# Patient Record
Sex: FEMALE | Race: WHITE | NOT HISPANIC OR LATINO | Employment: FULL TIME | URBAN - METROPOLITAN AREA
[De-identification: names, ages, dates, MRNs, and addresses within clinical notes are randomized per-mention and may not be internally consistent; named-entity substitution may affect disease eponyms.]

---

## 2017-03-24 ENCOUNTER — OFFICE VISIT (OUTPATIENT)
Dept: FAMILY MEDICINE | Facility: CLINIC | Age: 27
End: 2017-03-24
Payer: COMMERCIAL

## 2017-03-24 VITALS
HEIGHT: 62 IN | RESPIRATION RATE: 15 BRPM | OXYGEN SATURATION: 98 % | SYSTOLIC BLOOD PRESSURE: 122 MMHG | DIASTOLIC BLOOD PRESSURE: 84 MMHG | BODY MASS INDEX: 41.36 KG/M2 | TEMPERATURE: 99 F | WEIGHT: 224.75 LBS | HEART RATE: 94 BPM

## 2017-03-24 DIAGNOSIS — N63.10 LUMP OF RIGHT BREAST: Primary | ICD-10-CM

## 2017-03-24 DIAGNOSIS — Z23 IMMUNIZATION DUE: ICD-10-CM

## 2017-03-24 PROCEDURE — 1160F RVW MEDS BY RX/DR IN RCRD: CPT | Mod: S$GLB,,, | Performed by: NURSE PRACTITIONER

## 2017-03-24 PROCEDURE — 99214 OFFICE O/P EST MOD 30 MIN: CPT | Mod: 25,S$GLB,, | Performed by: NURSE PRACTITIONER

## 2017-03-24 PROCEDURE — 90471 IMMUNIZATION ADMIN: CPT | Mod: S$GLB,,, | Performed by: NURSE PRACTITIONER

## 2017-03-24 PROCEDURE — 90651 9VHPV VACCINE 2/3 DOSE IM: CPT | Mod: S$GLB,,, | Performed by: NURSE PRACTITIONER

## 2017-03-24 NOTE — MR AVS SNAPSHOT
[facility]  [address] [address]  [place] [address]  Phone: [phone]  Fax: [phone]                  Wendy Millan   3/24/2017 3:20 PM   Office Visit    Description:  Female : 1990   Provider:  Rosa Thomason NP   Department:  [facility]           Reason for Visit     Abrasion           Diagnoses this Visit        Comments    Lump of right breast    -  Primary     Immunization due                To Do List           Future Appointments        Provider Department Dept Phone    3/28/2017 11:30 AM [facility] US2 [facility] [phone]    3/31/2017 10:30 AM [facility] MAMMO1 [facility] [phone]    2017 10:00 AM NURSE, [facility] [phone]    2017 10:00 AM NURSE, [facility] [phone]      Goals (5 Years of Data)              6/22/15    BMI is less than 25   Not on track      [facility]     [facility] Nurse [place] [facility] Line -  Assistance  Registered nurses in the [facility] provide clinical advisement, health education, appointment booking, and other advisory services.  Call for this free service at [phone].             Medications           Message regarding Medications     Verify the changes and/or additions to your medication regime listed below are the same as discussed with your clinician today.  If any of these changes or additions are incorrect, please notify your healthcare provider.        STOP taking these medications     doxycycline (VIBRA-TABS) 100 MG tablet 1 po bid with food, do not lay down for at least 1 hour after ingestion    ELIDEL 1 % cream AAA eyelids twice daily prn    ZIANA gel Apply small amount to entire face qhs           Verify that the below list of medications is an accurate representation of the medications you are currently taking.  If none reported, the list may be blank. If  "incorrect, please contact your healthcare provider. Carry this list with you in case of emergency.           Current Medications     diphenhydrAMINE (BENADRYL) 25 mg capsule Take 25 mg by mouth every 6 (six) hours as needed for Itching.    omeprazole (PRILOSEC) 40 MG capsule Take 1 capsule (40 mg total) by mouth once daily.    tazarotene (TAZORAC) 0.05 % Crea cream Apply topically once daily.           Clinical Reference Information           Your Vitals Were     BP Pulse Temp Resp Height Weight    122/84 (BP Location: Left arm, Patient Position: Sitting, BP Method: Manual) 94 99.4 °F (37.4 °C) (Oral) 15 5' 2" (1.575 m) 101.9 kg (224 lb 12.1 oz)    SpO2 BMI             98% 41.11 kg/m2         Blood Pressure          Most Recent Value    BP  122/84      Allergies as of 3/24/2017     No Known Allergies      Immunizations Administered on Date of Encounter - 3/24/2017     Name Date Dose VIS Date Route    HPV 9-Valent 3/24/2017 0.5 mL 12/2/2016 Intramuscular      Orders Placed During Today's Visit      Normal Orders This Visit    HPV Vaccine (9-Valent) (3 Dose) (IM)     Future Labs/Procedures Expected by Expires    Mammo Digital Diagnostic Right With CAD  3/24/2017 5/24/2018    US Breast Right Limited  3/24/2017 5/24/2018      Language Assistance Services     ATTENTION: Language assistance services are available, free of charge. Please call [phone].      ATENCIÓN: Si habla español, tiene a maldonado disposición servicios gratuitos de asistencia lingüística. Llame al [phone].     CHÚ Ý: N?u b?n nói Ti?ng Vi?t, có các d?ch v? h? tr? ngôn ng? mi?n phí dành cho b?n. G?i s? [phone].         [facility] complies with applicable Federal civil rights laws and does not discriminate on the basis of race, color, national origin, age, disability, or sex.        "

## 2017-03-25 NOTE — PROGRESS NOTES
Answers for HPI/ROS submitted by the patient on 3/23/2017   neck pain: No, No  unexpected weight change: No  hearing loss: No  rhinorrhea: No  trouble swallowing: No  eye discharge: No  visual disturbance: No  chest tightness: No  wheezing: No  chest pain: No  palpatations: No  blood in stool: No  constipation: No  vomiting: No  diarrhea: No  polydipsia: No  polyuria: No  difficulty urinating: No  hematuria: No  menstrual problem: No  dysuria: No  joint swelling: No  arthralgias: No  headaches: No  weakness: No  confusion: No  dysphoric mood: No

## 2017-03-25 NOTE — PROGRESS NOTES
"Subjective:       Patient ID: Wendy Millan is a 26 y.o. female.    Chief Complaint: Abrasion (on right breast- cat scratch fever )    HPI Comments: The patient tells me that approximately 6 months ago she had a cat scratch fever.  She had some swelling of the lymph nodes of the right axillary area as well as swelling of the right breast.  She tells me the swelling of the right breast has truly never gone away.  She denies any swelling of the lymph nodes to the axillary area.  No night sweats.  No unintentional weight loss.     Review of Systems   Constitutional: Negative for activity change and unexpected weight change.   HENT: Negative for hearing loss, rhinorrhea and trouble swallowing.    Eyes: Negative for discharge and visual disturbance.   Respiratory: Negative for chest tightness and wheezing.    Cardiovascular: Negative for chest pain and palpitations.   Gastrointestinal: Negative for blood in stool, constipation, diarrhea and vomiting.   Endocrine: Negative for polydipsia and polyuria.   Genitourinary: Negative for difficulty urinating, dysuria, hematuria and menstrual problem.   Musculoskeletal: Negative for arthralgias and joint swelling.   Neurological: Negative for weakness and headaches.   Psychiatric/Behavioral: Negative for confusion and dysphoric mood.       Objective:       Vitals:    03/24/17 1527   BP: 122/84   Pulse: 94   Resp: 15   Temp: 99.4 °F (37.4 °C)   TempSrc: Oral   SpO2: 98%   Weight: 101.9 kg (224 lb 12.1 oz)   Height: 5' 2" (1.575 m)   PainSc: 0-No pain       Physical Exam   Constitutional: She is oriented to person, place, and time. She appears well-developed.   Obese female    HENT:   Head: Normocephalic and atraumatic.   Right Ear: External ear normal.   Left Ear: External ear normal.   Nose: Nose normal.   Mouth/Throat: Oropharynx is clear and moist.   Eyes: Conjunctivae are normal. Right eye exhibits no discharge. Left eye exhibits no discharge. No scleral icterus. "   Neck: Normal range of motion. Neck supple. No tracheal deviation present.   Cardiovascular: Normal rate, regular rhythm and normal heart sounds.  Exam reveals no friction rub.    No murmur heard.  Pulmonary/Chest: Effort normal and breath sounds normal. No stridor. No respiratory distress. She has no wheezes. She has no rales. She exhibits no tenderness.   There is definite swelling over the right lateral breast. There is a palpable lump as well     No lymphadenopathy   Musculoskeletal: Normal range of motion.   Lymphadenopathy:     She has no cervical adenopathy.   Neurological: She is alert and oriented to person, place, and time.   Skin: Skin is warm and dry.   Psychiatric: She has a normal mood and affect.       Assessment:       1. Lump of right breast    2. Immunization due        Plan:       Wendy was seen today for abrasion.    Diagnoses and all orders for this visit:    Lump of right breast  -     Mammo Digital Diagnostic Right With CAD; Future  -     US Breast Right Limited; Future    Immunization due  -     HPV Vaccine (9-Valent) (3 Dose) (IM)

## 2017-03-28 ENCOUNTER — HOSPITAL ENCOUNTER (OUTPATIENT)
Dept: RADIOLOGY | Facility: HOSPITAL | Age: 27
Discharge: HOME | End: 2017-03-28
Attending: NURSE PRACTITIONER
Payer: COMMERCIAL

## 2017-03-28 DIAGNOSIS — N63.10 LUMP OF RIGHT BREAST: ICD-10-CM

## 2017-03-28 PROCEDURE — 76642 ULTRASOUND BREAST LIMITED: CPT | Mod: 26,RT,, | Performed by: RADIOLOGY

## 2017-03-28 PROCEDURE — 76642 ULTRASOUND BREAST LIMITED: CPT | Mod: TC,PO,RT

## 2017-04-07 ENCOUNTER — PATIENT MESSAGE (OUTPATIENT)
Dept: FAMILY MEDICINE | Facility: CLINIC | Age: 27
End: 2017-04-07

## 2017-04-07 DIAGNOSIS — E66.01 MORBID OBESITY, UNSPECIFIED OBESITY TYPE: Primary | ICD-10-CM

## 2017-04-10 DIAGNOSIS — K21.9 GASTROESOPHAGEAL REFLUX DISEASE, ESOPHAGITIS PRESENCE NOT SPECIFIED: Primary | ICD-10-CM

## 2017-04-10 RX ORDER — OMEPRAZOLE 40 MG/1
40 CAPSULE, DELAYED RELEASE ORAL DAILY
Qty: 90 CAPSULE | Refills: 2 | Status: SHIPPED | OUTPATIENT
Start: 2017-04-10 | End: 2017-07-27 | Stop reason: DRUGHIGH

## 2017-04-10 NOTE — TELEPHONE ENCOUNTER
Pt's pharmacy is requesting a refill for 90 day supply for noted medication. Please review and advise. Thank you. CLC

## 2017-04-24 ENCOUNTER — PATIENT MESSAGE (OUTPATIENT)
Dept: FAMILY MEDICINE | Facility: CLINIC | Age: 27
End: 2017-04-24

## 2017-04-25 ENCOUNTER — INITIAL CONSULT (OUTPATIENT)
Dept: BARIATRICS | Facility: CLINIC | Age: 27
End: 2017-04-25
Payer: COMMERCIAL

## 2017-04-25 VITALS
BODY MASS INDEX: 40.27 KG/M2 | SYSTOLIC BLOOD PRESSURE: 135 MMHG | RESPIRATION RATE: 16 BRPM | HEART RATE: 86 BPM | DIASTOLIC BLOOD PRESSURE: 75 MMHG | WEIGHT: 218.81 LBS | TEMPERATURE: 99 F | HEIGHT: 62 IN

## 2017-04-25 DIAGNOSIS — E66.01 MORBID OBESITY DUE TO EXCESS CALORIES: Primary | ICD-10-CM

## 2017-04-25 DIAGNOSIS — E66.01 MORBID OBESITY WITH BMI OF 40.0-44.9, ADULT: ICD-10-CM

## 2017-04-25 PROCEDURE — 99205 OFFICE O/P NEW HI 60 MIN: CPT | Mod: S$GLB,,, | Performed by: SURGERY

## 2017-04-25 PROCEDURE — 1160F RVW MEDS BY RX/DR IN RCRD: CPT | Mod: S$GLB,,, | Performed by: SURGERY

## 2017-04-25 PROCEDURE — 99999 PR PBB SHADOW E&M-EST. PATIENT-LVL IV: CPT | Mod: PBBFAC,,, | Performed by: SURGERY

## 2017-04-25 RX ORDER — OMEPRAZOLE 40 MG/1
CAPSULE, DELAYED RELEASE ORAL
Refills: 11 | COMMUNITY
Start: 2017-04-05 | End: 2017-05-25 | Stop reason: SDUPTHER

## 2017-04-25 RX ORDER — LISDEXAMFETAMINE DIMESYLATE 50 MG/1
50 CAPSULE ORAL EVERY MORNING
COMMUNITY
End: 2017-07-24 | Stop reason: CLARIF

## 2017-04-25 NOTE — LETTER
April 25, 2017      [place]  [address]  [facility] [address]           [facility]  [address], [address]  [place] [address]  Phone: [phone]  Fax: [phone]          Patient: Wendy Millan   MR Number: [id number]   YOB: 1990   Date of Visit: 4/25/2017       Dear Rosa Thomason:    Thank you for referring Wendy Millan to me for evaluation. Attached you will find relevant portions of my assessment and plan of care.    If you have questions, please do not hesitate to call me. I look forward to following Wendy Millan along with you.    Sincerely,    Tanner Hook MD    Enclosure  CC:  No Recipients    If you would like to receive this communication electronically, please contact [email or web address] or [phone] to request more information on [facility] Audio Link access.    For providers and/or their staff who would like to refer a patient to [facility], please contact us through our one-stop-shop provider referral line, [facility], at [phone].    If you feel you have received this communication in error or would no longer like to receive these types of communications, please e-mail [email or web address]

## 2017-04-25 NOTE — MR AVS SNAPSHOT
[facility]  [address], [address]  [place] [address]  Phone: [phone]  Fax: [phone]                  Wendy Millan   2017 11:00 AM   Initial consult    Description:  Female : 1990   Provider:  Tanner Hook MD   Department:  [facility]           Reason for Visit     Obesity           Diagnoses this Visit        Comments    Morbid obesity due to excess calories    -  Primary     Morbid obesity with BMI of 40.0-44.9, adult                To Do List           Future Appointments        Provider Department Dept Phone    2017 11:30 AM Shruthi Woody RD [facility] [phone]    2017 8:45 AM LAB, [place] [facility] [phone]    2017 9:00 AM [facility] XRFL1 [facility] [phone]    2017 1:00 PM EKG, [facility] [phone]    2017 11:00 AM [facility] [phone]      Goals (5 Years of Data)              6/22/15    BMI is less than 25   Not on track      Follow-Up and Disposition     Return in about 1 month (around 2017).    Follow-up and Disposition History      [facility] On Call     [facility] Nurse Care Line -  Assistance  Unless otherwise directed by your provider, please contact [facility], our nurse care line that is available for  assistance.     Registered nurses in the [facility] provide: appointment scheduling, clinical advisement, health education, and other advisory services.  Call: [phone] (toll free)               Medications           Message regarding Medications     Verify the changes and/or additions to your medication regime listed below are the same as discussed with your clinician today.  If any of these changes or additions are incorrect, please notify your healthcare provider.             Verify that the below list of medications is an accurate  "representation of the medications you are currently taking.  If none reported, the list may be blank. If incorrect, please contact your healthcare provider. Carry this list with you in case of emergency.           Current Medications     diphenhydrAMINE (BENADRYL) 25 mg capsule Take 25 mg by mouth every 6 (six) hours as needed for Itching.    lisdexamfetamine (VYVANSE) 50 MG capsule Take 50 mg by mouth every morning.    omeprazole (PRILOSEC) 40 MG capsule Take 1 capsule (40 mg total) by mouth once daily.    omeprazole (PRILOSEC) 40 MG capsule TAKE 1 CAPSULE (40 MG TOTAL) BY MOUTH ONCE DAILY.    tazarotene (TAZORAC) 0.05 % Crea cream Apply topically once daily.           Clinical Reference Information           Your Vitals Were     BP Pulse Temp Resp Height Weight    135/75 86 98.9 °F (37.2 °C) (Oral) 16 5' 2" (1.575 m) 99.2 kg (218 lb 12.8 oz)    BMI                40.02 kg/m2          Blood Pressure          Most Recent Value    BP  135/75      Allergies as of 4/25/2017     No Known Allergies      Immunizations Administered on Date of Encounter - 4/25/2017     None      Orders Placed During Today's Visit      Normal Orders This Visit    Ambulatory consult to Nutrition Services     Ambulatory consult to Psychology     Future Labs/Procedures Expected by Expires    BMP  4/25/2017 6/24/2018    CBC w/ Auto Differential  4/25/2017 6/24/2018    FL Upper GI Without KUB  4/25/2017 4/25/2018    Folate Serum  4/25/2017 6/24/2018    Free T4  4/25/2017 6/24/2018    H. Pylori Antibody, IGG  4/25/2017 6/24/2018    Hepatic Panel  4/25/2017 6/24/2018    Hg A1c  4/25/2017 6/24/2018    Iron & TIBC  4/25/2017 6/24/2018    Lipid Profile  4/25/2017 6/24/2018    Magnesium  4/25/2017 6/24/2018    Phosphorus  4/25/2017 6/24/2018    T3  4/25/2017 6/24/2018    T4  4/25/2017 6/24/2018    TSH  4/25/2017 6/24/2018    Vitamin B12  4/25/2017 6/24/2018    Vitamin B1  4/25/2017 6/24/2018    Vitamin D 25 Hydroxy  4/25/2017 6/24/2018    EKG 12-lead  " As directed 4/25/2018      Language Assistance Services     ATTENTION: Language assistance services are available, free of charge. Please call [phone].      ATENCIÓN: Si habla moises, tiene a maldonado disposición servicios gratuitos de asistencia lingüística. Llame al [phone].     CHÚ Ý: N?u b?n nói Ti?ng Vi?t, có các d?ch v? h? tr? ngôn ng? mi?n phí dành cho b?n. G?i s? [phone].         [facility] complies with applicable Federal civil rights laws and does not discriminate on the basis of race, color, national origin, age, disability, or sex.

## 2017-04-25 NOTE — PROGRESS NOTES
Initial Consult    Chief Complaint   Patient presents with    Obesity       History of Present Illness:  Patient is a 26 y.o. female who is referred for evaluation of surgical treatment of morbid obesity. Her Body mass index is 40.02 kg/(m^2). She has known comorbidities of GERD. She has not attended the bariatric seminar and is most interested in gastric sleeve surgery.      Past attempts at weight loss include: Unsupervised: calorie counting, gym membership;  Supervised:  Diet center, diet pills from MD, weight watchers;  Diet pills: vyvanse, phentermine;  Exercise attempts: walking or running, group classes    Weight history:   At current weight:  3 years  Obese for 5 years.  More than 35 pounds overweight for 7 years.  More than 100 pounds overweight for 3 years.  Started dieting at 14 years old.  Maximum weight reached: 218 pounds  Most weight lost was 20 pounds through diet pills for 3 months.  She describes Her eating habits as sweet eater, snacker/grazer.    ALYSIA screening: sleeps well through the night    Reflux screening: takes prilosec, had nearly whole life, controlled    Review of patient's allergies indicates:  No Known Allergies    Current Outpatient Prescriptions   Medication Sig Dispense Refill    diphenhydrAMINE (BENADRYL) 25 mg capsule Take 25 mg by mouth every 6 (six) hours as needed for Itching.      omeprazole (PRILOSEC) 40 MG capsule Take 1 capsule (40 mg total) by mouth once daily. 90 capsule 2    omeprazole (PRILOSEC) 40 MG capsule TAKE 1 CAPSULE (40 MG TOTAL) BY MOUTH ONCE DAILY.  11    lisdexamfetamine (VYVANSE) 50 MG capsule Take 50 mg by mouth every morning.      tazarotene (TAZORAC) 0.05 % Crea cream Apply topically once daily. (Patient taking differently: Apply topically as needed. ) 30 g 0     No current facility-administered medications for this visit.        Past Medical History:   Diagnosis Date    GERD (gastroesophageal reflux disease)     Kidney stones      Past Surgical  History:   Procedure Laterality Date    EXTRACORPOREAL SHOCK WAVE LITHOTRIPSY      with stent placement    WISDOM TOOTH EXTRACTION       Family History   Problem Relation Age of Onset    Mitral valve prolapse Mother     Migraines Mother     Allergic rhinitis Mother     Asthma Mother     Colon polyps Mother     Hypertension Father     Hypertension Maternal Grandfather     Diabetes Paternal Grandmother     Angioedema Neg Hx     Eczema Neg Hx     Immunodeficiency Neg Hx     Urticaria Neg Hx     Breast cancer Neg Hx     Ovarian cancer Neg Hx      Social History   Substance Use Topics    Smoking status: Never Smoker    Smokeless tobacco: Never Used    Alcohol use Yes      Comment: social        Chart review:    9/23/16: SHEYLA Thomason: treated for: Cat scratch fever: z osbaldo; Screening labs, Neck swelling: us of neck; Skin lesion: derm ref; GERD: prilosec possible gi ref  10/5/16: Gyn: Dr. Augustine:  Treated for: cervical cancer screening with pap; Cat scratch us right breast  3/24/17: PCP Paddy: Treated for lump in right breast: with us, mmg; Immunization    Lab review:    Lab Results   Component Value Date    WBC 9.96 10/03/2016    HGB 13.7 10/03/2016    HCT 40.6 10/03/2016    MCV 80 (L) 10/03/2016     10/03/2016         CMP  Sodium   Date Value Ref Range Status   10/03/2016 141 136 - 145 mmol/L Final     Potassium   Date Value Ref Range Status   10/03/2016 4.7 3.5 - 5.1 mmol/L Final     Chloride   Date Value Ref Range Status   10/03/2016 107 95 - 110 mmol/L Final     CO2   Date Value Ref Range Status   10/03/2016 26 23 - 29 mmol/L Final     Glucose   Date Value Ref Range Status   10/03/2016 90 70 - 110 mg/dL Final     BUN, Bld   Date Value Ref Range Status   10/03/2016 11 6 - 20 mg/dL Final     Creatinine   Date Value Ref Range Status   10/03/2016 0.9 0.5 - 1.4 mg/dL Final     Calcium   Date Value Ref Range Status   10/03/2016 9.5 8.7 - 10.5 mg/dL Final     Total Protein   Date Value Ref Range Status    10/03/2016 7.4 6.0 - 8.4 g/dL Final     Albumin   Date Value Ref Range Status   10/03/2016 3.8 3.5 - 5.2 g/dL Final     Total Bilirubin   Date Value Ref Range Status   10/03/2016 0.2 0.1 - 1.0 mg/dL Final     Comment:     For infants and newborns, interpretation of results should be based  on gestational age, weight and in agreement with clinical  observations.  Premature Infant recommended reference ranges:  Up to 24 hours.............<8.0 mg/dL  Up to 48 hours............<12.0 mg/dL  3-5 days..................<15.0 mg/dL  6-29 days.................<15.0 mg/dL       Alkaline Phosphatase   Date Value Ref Range Status   10/03/2016 75 55 - 135 U/L Final     AST   Date Value Ref Range Status   10/03/2016 22 10 - 40 U/L Final     ALT   Date Value Ref Range Status   10/03/2016 33 10 - 44 U/L Final     Anion Gap   Date Value Ref Range Status   10/03/2016 8 8 - 16 mmol/L Final     eGFR if    Date Value Ref Range Status   10/03/2016 >60.0 >60 mL/min/1.73 m^2 Final     eGFR if non    Date Value Ref Range Status   10/03/2016 >60.0 >60 mL/min/1.73 m^2 Final     Comment:     Calculation used to obtain the estimated glomerular filtration  rate (eGFR) is the CKD-EPI equation. Since race is unknown   in our information system, the eGFR values for   -American and Non--American patients are given   for each creatinine result.           Lab Results   Component Value Date    TSH 3.479 10/03/2016         Radiology review:    3/24/ 17: US right breast: normal     Review of Systems:  Review of Systems   Constitutional: Positive for fatigue. Negative for activity change, appetite change, fever and unexpected weight change.   HENT: Positive for sinus pressure. Negative for congestion, sneezing, sore throat, tinnitus and voice change.    Eyes: Negative for redness, itching and visual disturbance.   Respiratory: Negative for apnea, cough, choking, chest tightness, shortness of breath, wheezing  "and stridor.    Cardiovascular: Negative for chest pain, palpitations and leg swelling.   Gastrointestinal: Negative for abdominal distention, abdominal pain, anal bleeding, blood in stool, constipation, diarrhea, nausea, rectal pain and vomiting.   Endocrine: Negative for cold intolerance and heat intolerance.   Genitourinary: Negative for difficulty urinating and dysuria.   Musculoskeletal: Negative for arthralgias, back pain, gait problem, joint swelling, myalgias, neck pain and neck stiffness.   Skin: Positive for rash. Negative for wound.        Psoriasis mainly on eye lids and sweaty areas or on head   Allergic/Immunologic: Positive for environmental allergies. Negative for food allergies.   Neurological: Positive for headaches. Negative for dizziness and light-headedness.   Hematological: Positive for adenopathy. Does not bruise/bleed easily.   Psychiatric/Behavioral: Negative for agitation and confusion.       Physical:     Vital Signs (Most Recent)  Temp: 98.9 °F (37.2 °C) (04/25/17 1042)  Pulse: 86 (04/25/17 1042)  Resp: 16 (04/25/17 1042)  BP: 135/75 (04/25/17 1042)  5' 2" (1.575 m)  99.2 kg (218 lb 12.8 oz)     Body comp:  Fat Percent:  50.9 %  Fat Mass:  111.4 lb  FFM:  107.4 lb  TBW: 79 lb  TBW %:  36.1 %  BMR: 1607 kcal    Physical Exam:  Physical Exam   Constitutional: She is oriented to person, place, and time. She appears well-developed and well-nourished. No distress.   HENT:   Head: Normocephalic and atraumatic.   Mouth/Throat: No oropharyngeal exudate.   Eyes: Conjunctivae and EOM are normal. Pupils are equal, round, and reactive to light. No scleral icterus.   Neck: Normal range of motion. Neck supple. No JVD present. No tracheal deviation present. No thyromegaly present.   Cardiovascular: Normal rate, regular rhythm and normal heart sounds.  Exam reveals no gallop and no friction rub.    No murmur heard.  Pulmonary/Chest: Effort normal and breath sounds normal. No stridor. No respiratory " distress. She has no wheezes. She has no rales. She exhibits no tenderness.   Abdominal: Soft. Bowel sounds are normal. She exhibits no distension and no mass. There is no tenderness. There is no rebound and no guarding.   Musculoskeletal: Normal range of motion. She exhibits no edema or tenderness.   Lymphadenopathy:     She has no cervical adenopathy.   Neurological: She is alert and oriented to person, place, and time. No cranial nerve deficit.   Skin: Skin is warm and dry. No rash noted. She is not diaphoretic. No erythema.   Psychiatric: She has a normal mood and affect. Her behavior is normal.   Nursing note and vitals reviewed.      ASSESSMENT/PLAN:        1. Morbid obesity due to excess calories  BMP    CBC w/ Auto Differential    Folate Serum    H. Pylori Antibody, IGG    Hg A1c    Hepatic Panel    Iron & TIBC    Lipid Profile    Magnesium    Phosphorus    T3    T4    TSH    Free T4    Vitamin B12    Vitamin B1    Vitamin D 25 Hydroxy    Ambulatory consult to Nutrition Services    EKG 12-lead    Ambulatory consult to Psychology    FL Upper GI Without KUB   2. Morbid obesity with BMI of 40.0-44.9, adult         Plan:  Wendy Millan has morbid obesity as their Body mass index is 40.02 kg/(m^2). She would benefit from weight loss surgery and has chosen gastric sleeve surgery as the preferred procedure. She understands that this is a tool and lifestyle change will be necessary to keep weight off. I went over possible complications of all surgeries with the patient and she is agreeable to surgery.    She will need:    0/3 msd  Labs   EKG  UGI   dietary consult  psych consult   Seminar    I will obtain the following clearances prior to surgery: none      Diet plan: high protein low carb- mainly meats and vegetables  Exercise plan: Cardiovascular exercise, get HR over 100 for 20 minutes 3 times per week.  Start multivitamin

## 2017-05-01 ENCOUNTER — CLINICAL SUPPORT (OUTPATIENT)
Dept: BARIATRICS | Facility: CLINIC | Age: 27
End: 2017-05-01
Payer: COMMERCIAL

## 2017-05-01 ENCOUNTER — TELEPHONE (OUTPATIENT)
Dept: BARIATRICS | Facility: CLINIC | Age: 27
End: 2017-05-01

## 2017-05-01 VITALS — BODY MASS INDEX: 40.97 KG/M2 | WEIGHT: 224 LBS

## 2017-05-01 DIAGNOSIS — E66.01 MORBID OBESITY DUE TO EXCESS CALORIES: Primary | ICD-10-CM

## 2017-05-01 PROCEDURE — 97802 MEDICAL NUTRITION INDIV IN: CPT | Mod: S$GLB,,, | Performed by: DIETITIAN, REGISTERED

## 2017-05-01 PROCEDURE — 99999 PR PBB SHADOW E&M-EST. PATIENT-LVL I: CPT | Mod: PBBFAC,,, | Performed by: DIETITIAN, REGISTERED

## 2017-05-01 NOTE — PROGRESS NOTES
PCP: Rosa Thomason NP  REFERRING PROVIDER:  Tanner Hook MD      HISTORY OF PRESENT ILLNESS:  26 y.o. female patient is in clinic today for bariatric surgery assessment. Patient interested in gastric sleeve.     Patient denies nausea, vomiting and diarrhea. Weight difficulties for 5 years.  Weight loss strategies include Unsupervised: calorie counting, gym membership; Supervised: Diet center, diet pills from MD, weight watchers; Diet pills: vyvanse, phentermine; Exercise attempts: walking or running, group classes.      VITAL SIGNS:  Height:62in  Weight: 223 lb   IBW: 110 lbs +/-10%    ALLERGIES & MEDICATIONS: Reviewed and Reconciled  MEDICAL/SURGICAL & FAMILY HISTORY: Reviewed and Reconciled    LABORATORY:  A1C:   Hemoglobin A1C   Date Value Ref Range Status   10/03/2016 5.3 4.5 - 6.2 % Final     Comment:     According to ADA guidelines, hemoglobin A1C <7.0% represents  optimal control in non-pregnant diabetic patients.  Different  metrics may apply to specific populations.   Standards of Medical Care in Diabetes - 2016.  For the purpose of screening for the presence of diabetes:  <5.7%     Consistent with the absence of diabetes  5.7-6.4%  Consistent with increasing risk for diabetes   (prediabetes)  >or=6.5%  Consistent with diabetes  Currently no consensus exists for use of hemoglobin A1C  for diagnosis of diabetes for children.       Chol:   Cholesterol   Date Value Ref Range Status   10/03/2016 166 120 - 199 mg/dL Final     Comment:     The National Cholesterol Education Program (NCEP) has set the  following guidelines (reference ranges) for Cholesterol:  Optimal.....................<200 mg/dL  Borderline High.............200-239 mg/dL  High........................> or = 240 mg/dL       Trig:   Triglycerides   Date Value Ref Range Status   10/03/2016 76 30 - 150 mg/dL Final     Comment:     The National Cholesterol Education Program (NCEP) has set the  following guidelines (reference values) for  triglycerides:  Normal......................<150 mg/dL  Borderline High.............150-199 mg/dL  High........................200-499 mg/dL       HDL:   HDL   Date Value Ref Range Status   10/03/2016 51 40 - 75 mg/dL Final     Comment:     The National Cholesterol Education Program (NCEP) has set the  following guidelines (reference values) for HDL Cholesterol:  Low...............<40 mg/dL  Optimal...........>60 mg/dL       LDL: No components found for: LDL   BUN:      BUN, Bld   Date Value Ref Range Status   10/03/2016 11 6 - 20 mg/dL Final     AST:    AST   Date Value Ref Range Status   10/03/2016 22 10 - 40 U/L Final         ALT:    ALT   Date Value Ref Range Status   10/03/2016 33 10 - 44 U/L Final     Micro/Cr Ratio:    Creatinine   Date Value Ref Range Status   10/03/2016 0.9 0.5 - 1.4 mg/dL Final       SELF-MONITORING:  Food - none are avail    ACTIVITY LEVEL: Aerobic 30-45 min 3-4 d/wk. Resistance none. Property- walking around property with [id number]     NUTRITION INTAKE: Meal patterns include 3 meals, 1-2 snacks daily  Wake up: 9am   B - greek yogurt, eggs and turkey diaz, black tea with milk and sweet and low    12:30-1L -bringing salads with grilled chicken or boiled egg    6-7pmD - BBQ chicken, broccoli, hamburger without bun with cheese     Beverages - coffee- rarely, tea, water  Dining out - 1x during time period since seeing Dr. Hook  Works at  [employer]  Transition in the house living situation due to brother moving out    Alcohol- socially   Patient and mom does the cooking in the house   Mom support system in the house for surgery   Supplement: MVI   No protein shake yet but planning on starting soon     Difficult transition with diet  Tired of cupcakes at work- just bakes and does not eat     PSYCHOSOCIAL: Stage of change - action  Barriers to change - none  Motivation to change (10high): 8  Predicted compliance (10high): 8  Realistic expectation for wt loss (10high):  9  Verbalizes understanding of dietary changes post procedure and willingness to participate in physical activity (10high): 9    MNT ASSESSMENT:   1200 calories, 60-70 grams protein daily  increase fruit 2 serv/d, vegetables 2+ cups/d  low-fat, low-sodium  150 min physical activity per week, moderate intensity, as tolerated    PLAN:    Reviewed MNT guidelines for obesity and weight management.   Encouraged daily self-monitoring of food & activity patterns.    Provided pre & post surgery meal-planning instruction via bariatric diet manual, foodlists, plate method, food models, food labels and/or ADA booklet. Reviewed micro/macronutrient effect on weight management. Discussed carbohydrate counting and spacing techniques with emphasis on supplementation necessary for altered metabolism. Reviewed principles of energy metabolism to assist weight and health management.                                                          Discussed physical activity with review of benefits, methods, precautions.    Discussed bx strategies for improving, strategies for improving social & environmental support of lifestyle changes.     GOALS: Self monitoring: daily food & activity journal. Meal plan-90% accuracy, Physical activity-150 minutes per week.   FU:as needed per bariatric MD  Visit Time Spent:  60 minutes    Thank you for the opportunity to work with your patient.  Shruthi Woody, RD, LDN  Bariatric Dietitian

## 2017-05-01 NOTE — TELEPHONE ENCOUNTER
----- Message from Sarina Concepcion sent at 5/1/2017  1:25 PM CDT -----  Patient is calling to find out if the gastric sleeve surgery is outpatient. Please call to answer all of her questions at [phone].

## 2017-05-04 ENCOUNTER — TELEPHONE (OUTPATIENT)
Dept: SURGERY | Facility: CLINIC | Age: 27
End: 2017-05-04

## 2017-05-04 NOTE — TELEPHONE ENCOUNTER
----- Message from Alan Ellington sent at 5/4/2017  2:11 PM CDT -----  Contact: Mother- Yi- [phone]  Patient's mother called asking if the fax was sufficient for the doctor regarding the patient.. Thanks!

## 2017-05-05 ENCOUNTER — TELEPHONE (OUTPATIENT)
Dept: BARIATRICS | Facility: CLINIC | Age: 27
End: 2017-05-05

## 2017-05-18 ENCOUNTER — HOSPITAL ENCOUNTER (OUTPATIENT)
Dept: RADIOLOGY | Facility: HOSPITAL | Age: 27
Discharge: HOME | End: 2017-05-18
Attending: SURGERY
Payer: COMMERCIAL

## 2017-05-18 ENCOUNTER — CLINICAL SUPPORT (OUTPATIENT)
Dept: CARDIOLOGY | Facility: CLINIC | Age: 27
End: 2017-05-18
Payer: COMMERCIAL

## 2017-05-18 DIAGNOSIS — E66.01 MORBID OBESITY DUE TO EXCESS CALORIES: ICD-10-CM

## 2017-05-18 PROCEDURE — 74240 X-RAY XM UPR GI TRC 1CNTRST: CPT | Mod: 26,,, | Performed by: RADIOLOGY

## 2017-05-18 PROCEDURE — 93000 ELECTROCARDIOGRAM COMPLETE: CPT | Mod: S$GLB,,, | Performed by: INTERNAL MEDICINE

## 2017-05-18 PROCEDURE — 74240 X-RAY XM UPR GI TRC 1CNTRST: CPT | Mod: TC,PO

## 2017-05-25 ENCOUNTER — OFFICE VISIT (OUTPATIENT)
Dept: BARIATRICS | Facility: CLINIC | Age: 27
End: 2017-05-25
Payer: COMMERCIAL

## 2017-05-25 VITALS
TEMPERATURE: 99 F | SYSTOLIC BLOOD PRESSURE: 119 MMHG | WEIGHT: 219.63 LBS | RESPIRATION RATE: 16 BRPM | DIASTOLIC BLOOD PRESSURE: 67 MMHG | BODY MASS INDEX: 40.42 KG/M2 | HEART RATE: 84 BPM | HEIGHT: 62 IN

## 2017-05-25 DIAGNOSIS — E66.01 MORBID OBESITY DUE TO EXCESS CALORIES: Primary | ICD-10-CM

## 2017-05-25 DIAGNOSIS — E66.01 MORBID OBESITY WITH BMI OF 40.0-44.9, ADULT: ICD-10-CM

## 2017-05-25 PROCEDURE — 99213 OFFICE O/P EST LOW 20 MIN: CPT | Mod: S$GLB,,, | Performed by: SURGERY

## 2017-05-25 PROCEDURE — 99999 PR PBB SHADOW E&M-EST. PATIENT-LVL III: CPT | Mod: PBBFAC,,, | Performed by: SURGERY

## 2017-05-25 NOTE — PROGRESS NOTES
Medically Supervised Weight Loss Documentation    Date of Visit: 05/25/2017    Patient: Wendy Millan    Current Weight: 218  Current BMI: Body mass index is 40.17 kg/m².  Weight Change: +1 pounds    Last Weight: 219    Beginning Weight: 219      Vitals:   Vitals:    05/25/17 1058   BP: 119/67   Pulse: 84   Resp: 16   Temp: 98.7 °F (37.1 °C)       Comorbidities:   Past Medical History:   Diagnosis Date    GERD (gastroesophageal reflux disease)     Kidney stones        Medications:  Current Outpatient Prescriptions on File Prior to Visit   Medication Sig Dispense Refill    omeprazole (PRILOSEC) 40 MG capsule Take 1 capsule (40 mg total) by mouth once daily. 90 capsule 2    diphenhydrAMINE (BENADRYL) 25 mg capsule Take 25 mg by mouth every 6 (six) hours as needed for Itching.      lisdexamfetamine (VYVANSE) 50 MG capsule Take 50 mg by mouth every morning.      tazarotene (TAZORAC) 0.05 % Crea cream Apply topically once daily. (Patient taking differently: Apply topically as needed. ) 30 g 0    [DISCONTINUED] omeprazole (PRILOSEC) 40 MG capsule TAKE 1 CAPSULE (40 MG TOTAL) BY MOUTH ONCE DAILY.  11     No current facility-administered medications on file prior to visit.          Body comp:  Fat Percent:  50.1 %  Fat Mass:  110 lb  FFM:  109.6 lb  TBW: 80.4 lb  TBW %:  36.6 %  BMR: 1631 kcal      Diet Education Discussed:    Breakfast:  premier  Lunch:  Hamburger paola, grilled chicken or fish  Dinner:  Beef, chicken, or fish with peas green beans, carrots, mushrooms  Snacks: carrot sticks  Crackers occasionally    Exercise/Activity Discussed:    [employer] class: weight lifting- 3 days per week for 1 hour    Behavior or Diet Goals for this patient:    Slowly increase intensity of exercising over time.  Avoid carbs like crackers.  Try to reduce portion sizes over time.    1/3 msd  Labs - low vitamin d- will replace  EKG- nsr  UGI - reflux, no hiatal hernia- will plan egd at time of surgery  dietary  consult- done  psych consult - Saw Dr. Tracy Stephen- Cleared  Seminar- done    I will obtain the following clearances prior to surgery: none    : I met with the patient for 15 minutes and counseled her for over 50% of that time

## 2017-06-27 ENCOUNTER — OFFICE VISIT (OUTPATIENT)
Dept: BARIATRICS | Facility: CLINIC | Age: 27
End: 2017-06-27
Payer: COMMERCIAL

## 2017-06-27 VITALS
WEIGHT: 214.63 LBS | TEMPERATURE: 98 F | BODY MASS INDEX: 39.49 KG/M2 | DIASTOLIC BLOOD PRESSURE: 73 MMHG | RESPIRATION RATE: 16 BRPM | HEIGHT: 62 IN | SYSTOLIC BLOOD PRESSURE: 120 MMHG | HEART RATE: 98 BPM

## 2017-06-27 DIAGNOSIS — E66.01 MORBID OBESITY WITH BMI OF 40.0-44.9, ADULT: ICD-10-CM

## 2017-06-27 DIAGNOSIS — E66.01 MORBID OBESITY DUE TO EXCESS CALORIES: Primary | ICD-10-CM

## 2017-06-27 PROCEDURE — 99999 PR PBB SHADOW E&M-EST. PATIENT-LVL III: CPT | Mod: PBBFAC,,, | Performed by: SURGERY

## 2017-06-27 PROCEDURE — 99213 OFFICE O/P EST LOW 20 MIN: CPT | Mod: S$GLB,,, | Performed by: SURGERY

## 2017-06-27 NOTE — PROGRESS NOTES
Medically Supervised Weight Loss Documentation    Date of Visit: 06/27/2017    Patient: Wendy Millan    Current Weight: 214  Current BMI: Body mass index is 39.25 kg/m².  Weight Change: - 5 pounds    Last Weight: 219    Beginning Weight: 219      Vitals:   Vitals:    06/27/17 1101   BP: 120/73   Pulse: 98   Resp: 16   Temp: 97.9 °F (36.6 °C)       Comorbidities:   Past Medical History:   Diagnosis Date    GERD (gastroesophageal reflux disease)     Kidney stones        Medications:  Current Outpatient Prescriptions on File Prior to Visit   Medication Sig Dispense Refill    MULTIVITAMIN/IRON/FOLIC ACID (CENTRUM ULTRA WOMEN'S ORAL) Take 1 tablet by mouth once daily at 6am.      omeprazole (PRILOSEC) 40 MG capsule Take 1 capsule (40 mg total) by mouth once daily. 90 capsule 2    diphenhydrAMINE (BENADRYL) 25 mg capsule Take 25 mg by mouth every 6 (six) hours as needed for Itching.      lisdexamfetamine (VYVANSE) 50 MG capsule Take 50 mg by mouth every morning.      tazarotene (TAZORAC) 0.05 % Crea cream Apply topically once daily. (Patient taking differently: Apply topically as needed. ) 30 g 0     No current facility-administered medications on file prior to visit.          Body comp:  Fat Percent:  50.9 %  Fat Mass:  109.2 lb  FFM:  105.4 lb  TBW: 77.2 lb  TBW %:  36 %  BMR: 1579 kcal      Diet Education Discussed:    Breakfast:  Protein shake  Lunch:  Sauteed chicken, cut up vegetables (olive oil water and salt)  Dinner:  Fish broiled with vegetables  Snacks: carrot sticks, cheese    Exercise/Activity Discussed:    [employer] class: weight lifting- 3 days per week for 1 hour    Behavior or Diet Goals for this patient:    Not losing as much weight on the diet as I would expect.  I think this is related to portion control.  I will ask her to do a diet journal and count calories only to educate her on how many calories she is taking in per day.  Expect that the surgery will work for portion control and  she has proven she can choose the correct foods.    Continue exercise.    2/3 msd  Labs - low vitamin d- will replace  EKG- nsr  UGI - reflux, no hiatal hernia- will plan egd at time of surgery  dietary consult- done  psych consult - Saw Dr. Tracy Sanchez [place]- Cleared  Seminar- done    I will obtain the following clearances prior to surgery: none    : I met with the patient for 15 minutes and counseled her for over 50% of that time

## 2017-07-07 ENCOUNTER — OFFICE VISIT (OUTPATIENT)
Dept: BARIATRICS | Facility: CLINIC | Age: 27
End: 2017-07-07
Payer: COMMERCIAL

## 2017-07-07 VITALS
BODY MASS INDEX: 40.25 KG/M2 | SYSTOLIC BLOOD PRESSURE: 114 MMHG | WEIGHT: 218.69 LBS | HEIGHT: 62 IN | DIASTOLIC BLOOD PRESSURE: 71 MMHG | RESPIRATION RATE: 18 BRPM | HEART RATE: 87 BPM | TEMPERATURE: 98 F

## 2017-07-07 DIAGNOSIS — E66.01 MORBID OBESITY DUE TO EXCESS CALORIES: Primary | ICD-10-CM

## 2017-07-07 DIAGNOSIS — E66.01 MORBID OBESITY WITH BMI OF 40.0-44.9, ADULT: ICD-10-CM

## 2017-07-07 PROCEDURE — 99214 OFFICE O/P EST MOD 30 MIN: CPT | Mod: S$GLB,,, | Performed by: SURGERY

## 2017-07-07 PROCEDURE — 99999 PR PBB SHADOW E&M-EST. PATIENT-LVL IV: CPT | Mod: PBBFAC,,, | Performed by: SURGERY

## 2017-07-07 RX ORDER — HEPARIN SODIUM 5000 [USP'U]/ML
5000 INJECTION, SOLUTION INTRAVENOUS; SUBCUTANEOUS
Status: CANCELLED | OUTPATIENT
Start: 2017-07-07

## 2017-07-07 RX ORDER — FAMOTIDINE 10 MG/ML
20 INJECTION INTRAVENOUS
Status: CANCELLED | OUTPATIENT
Start: 2017-07-07

## 2017-07-07 NOTE — H&P
Follow up    SUBJECTIVE:     Chief Complaint   Patient presents with    Obesity       History of Present Illness:    Patient is a 26 y.o. female who is referred for evaluation of surgical treatment of morbid obesity. Her Body mass index is 39.43 kg/m².  She has known comorbidities of GERD. She has attended the bariatric seminar and is most interested in gastric sleeve surgery.    Diet:   Lots of fish with green beans, baked beans  Eating chicken as well  Cheated on fourth of July- alcohol and other meats    Exercise:     Less exercise classes - planning to return    MVI:  daily    Review of patient's allergies indicates:  No Known Allergies    Current Outpatient Prescriptions   Medication Sig Dispense Refill    diphenhydrAMINE (BENADRYL) 25 mg capsule Take 25 mg by mouth every 6 (six) hours as needed for Itching.      lisdexamfetamine (VYVANSE) 50 MG capsule Take 50 mg by mouth every morning.      MULTIVITAMIN/IRON/FOLIC ACID (CENTRUM ULTRA WOMEN'S ORAL) Take 1 tablet by mouth once daily at 6am.      omeprazole (PRILOSEC) 40 MG capsule Take 1 capsule (40 mg total) by mouth once daily. 90 capsule 2    tazarotene (TAZORAC) 0.05 % Crea cream Apply topically once daily. (Patient taking differently: Apply topically as needed. ) 30 g 0     No current facility-administered medications for this visit.        Past Medical History:   Diagnosis Date    GERD (gastroesophageal reflux disease)     Kidney stones      Past Surgical History:   Procedure Laterality Date    EXTRACORPOREAL SHOCK WAVE LITHOTRIPSY      with stent placement    WISDOM TOOTH EXTRACTION       Family History   Problem Relation Age of Onset    Mitral valve prolapse Mother     Migraines Mother     Allergic rhinitis Mother     Asthma Mother     Colon polyps Mother     Hypertension Father     Hypertension Maternal Grandfather     Diabetes Paternal Grandmother     Angioedema Neg Hx     Eczema Neg Hx     Immunodeficiency Neg Hx     Urticaria  "Neg Hx     Breast cancer Neg Hx     Ovarian cancer Neg Hx      Social History   Substance Use Topics    Smoking status: Never Smoker    Smokeless tobacco: Never Used    Alcohol use Yes      Comment: social        Review of Systems:  Review of Systems   Constitutional: Positive for fatigue. Negative for activity change, appetite change, fever and unexpected weight change.   HENT: Positive for sinus pressure. Negative for congestion, sneezing, sore throat, tinnitus and voice change.    Eyes: Negative for redness, itching and visual disturbance.   Respiratory: Negative for apnea, cough, choking, chest tightness, shortness of breath, wheezing and stridor.    Cardiovascular: Negative for chest pain, palpitations and leg swelling.   Gastrointestinal: Negative for abdominal distention, abdominal pain, anal bleeding, blood in stool, constipation, diarrhea, nausea, rectal pain and vomiting.   Endocrine: Negative for cold intolerance and heat intolerance.   Genitourinary: Negative for difficulty urinating and dysuria.   Musculoskeletal: Negative for arthralgias, back pain, gait problem, joint swelling, myalgias, neck pain and neck stiffness.   Skin: Positive for rash. Negative for wound.        Psoriasis mainly on eye lids and sweaty areas or on head   Allergic/Immunologic: Positive for environmental allergies. Negative for food allergies.   Neurological: Positive for headaches. Negative for dizziness and light-headedness.   Hematological: Positive for adenopathy. Does not bruise/bleed easily.   Psychiatric/Behavioral: Negative for agitation and confusion.       OBJECTIVE:     Vital Signs (Most Recent)  Temp: 98.3 °F (36.8 °C) (07/07/17 1406)  Pulse: 87 (07/07/17 1406)  Resp: 18 (07/07/17 1406)  BP: 114/71 (07/07/17 1406)  5' 2" (1.575 m)  97.8 kg (215 lb 9.6 oz)     Body comp:  Fat Percent:  49.8 %  Fat Mass:  107.4 lb  FFM:  108.2 lb  TBW: 79.4 lb  TBW %:  36.8 %  BMR: 1610 kcal        Physical Exam:  Physical Exam "   Constitutional: She is oriented to person, place, and time. She appears well-developed and well-nourished. No distress.   HENT:   Head: Normocephalic and atraumatic.   Mouth/Throat: No oropharyngeal exudate.   Eyes: Conjunctivae and EOM are normal. Pupils are equal, round, and reactive to light. No scleral icterus.   Neck: Normal range of motion. Neck supple. No JVD present. No tracheal deviation present. No thyromegaly present.   Cardiovascular: Normal rate, regular rhythm and normal heart sounds.  Exam reveals no gallop and no friction rub.    No murmur heard.  Pulmonary/Chest: Effort normal and breath sounds normal. No stridor. No respiratory distress. She has no wheezes. She has no rales. She exhibits no tenderness.   Abdominal: Soft. Bowel sounds are normal. She exhibits no distension and no mass. There is no tenderness. There is no rebound and no guarding.   Musculoskeletal: Normal range of motion. She exhibits no edema or tenderness.   Lymphadenopathy:     She has no cervical adenopathy.   Neurological: She is alert and oriented to person, place, and time. No cranial nerve deficit.   Skin: Skin is warm and dry. No rash noted. She is not diaphoretic. No erythema.   Psychiatric: She has a normal mood and affect. Her behavior is normal.   Nursing note and vitals reviewed.      ASSESSMENT/PLAN:        3/3 msd  Labs - low vitamin d- will replace  EKG- nsr  UGI - reflux, no hiatal hernia- will plan egd at time of surgery  dietary consult- done  psych consult - Saw Dr. Tracy Sanchez [place]- Cleared  Seminar- done    I will obtain the following clearances prior to surgery: none      1. Morbid obesity due to excess calories     2. Morbid obesity with BMI of 40.0-44.9, adult         Plan:  Wendy Millan has morbid obesity as their Body mass index is 39.43 kg/m². She would benefit from weight loss surgery and has chosen gastric sleeve surgery as the preferred procedure. She understands that this is  a tool and lifestyle change will be necessary to keep weight off. I went over possible complications of the chosen surgery with the patient and she is agreeable to surgery.    She has been instructed to start the pre operative diet start today    She surgical date is July 19, 2017.      The patient has been taught the post operative diet and understands that she will need to stay on this diet to prevent complication.  They are aware of the post operative follow up and return to see me after surgery to treat/prevent/identify any complications.  she has been advised to attend support groups post operatively.

## 2017-07-07 NOTE — PROGRESS NOTES
Follow up    SUBJECTIVE:     Chief Complaint   Patient presents with    Obesity       History of Present Illness:    Patient is a 26 y.o. female who is referred for evaluation of surgical treatment of morbid obesity. Her Body mass index is 40 kg/m².  She has known comorbidities of GERD. She has attended the bariatric seminar and is most interested in gastric sleeve surgery.    Diet:   Lots of fish with green beans, baked beans  Eating chicken as well  Cheated on fourth of July- alcohol and other meats    Exercise:     Less exercise classes - planning to return    MVI:  daily    Review of patient's allergies indicates:  No Known Allergies    Current Outpatient Prescriptions   Medication Sig Dispense Refill    diphenhydrAMINE (BENADRYL) 25 mg capsule Take 25 mg by mouth every 6 (six) hours as needed for Itching.      lisdexamfetamine (VYVANSE) 50 MG capsule Take 50 mg by mouth every morning.      MULTIVITAMIN/IRON/FOLIC ACID (CENTRUM ULTRA WOMEN'S ORAL) Take 1 tablet by mouth once daily at 6am.      omeprazole (PRILOSEC) 40 MG capsule Take 1 capsule (40 mg total) by mouth once daily. 90 capsule 2    tazarotene (TAZORAC) 0.05 % Crea cream Apply topically once daily. (Patient taking differently: Apply topically as needed. ) 30 g 0     No current facility-administered medications for this visit.        Past Medical History:   Diagnosis Date    GERD (gastroesophageal reflux disease)     Kidney stones      Past Surgical History:   Procedure Laterality Date    EXTRACORPOREAL SHOCK WAVE LITHOTRIPSY      with stent placement    WISDOM TOOTH EXTRACTION       Family History   Problem Relation Age of Onset    Mitral valve prolapse Mother     Migraines Mother     Allergic rhinitis Mother     Asthma Mother     Colon polyps Mother     Hypertension Father     Hypertension Maternal Grandfather     Diabetes Paternal Grandmother     Angioedema Neg Hx     Eczema Neg Hx     Immunodeficiency Neg Hx     Urticaria Neg  "Hx     Breast cancer Neg Hx     Ovarian cancer Neg Hx      Social History   Substance Use Topics    Smoking status: Never Smoker    Smokeless tobacco: Never Used    Alcohol use Yes      Comment: social        Review of Systems:  Review of Systems   Constitutional: Positive for fatigue. Negative for activity change, appetite change, fever and unexpected weight change.   HENT: Positive for sinus pressure. Negative for congestion, sneezing, sore throat, tinnitus and voice change.    Eyes: Negative for redness, itching and visual disturbance.   Respiratory: Negative for apnea, cough, choking, chest tightness, shortness of breath, wheezing and stridor.    Cardiovascular: Negative for chest pain, palpitations and leg swelling.   Gastrointestinal: Negative for abdominal distention, abdominal pain, anal bleeding, blood in stool, constipation, diarrhea, nausea, rectal pain and vomiting.   Endocrine: Negative for cold intolerance and heat intolerance.   Genitourinary: Negative for difficulty urinating and dysuria.   Musculoskeletal: Negative for arthralgias, back pain, gait problem, joint swelling, myalgias, neck pain and neck stiffness.   Skin: Positive for rash. Negative for wound.        Psoriasis mainly on eye lids and sweaty areas or on head   Allergic/Immunologic: Positive for environmental allergies. Negative for food allergies.   Neurological: Positive for headaches. Negative for dizziness and light-headedness.   Hematological: Positive for adenopathy. Does not bruise/bleed easily.   Psychiatric/Behavioral: Negative for agitation and confusion.       OBJECTIVE:     Vital Signs (Most Recent)  Temp: 98.3 °F (36.8 °C) (07/07/17 1406)  Pulse: 87 (07/07/17 1406)  Resp: 18 (07/07/17 1406)  BP: 114/71 (07/07/17 1406)  5' 2" (1.575 m)  99.2 kg (218 lb 11.1 oz)     Body comp:  Fat Percent:  49.8 %  Fat Mass:  107.4 lb  FFM:  108.2 lb  TBW: 79.4 lb  TBW %:  36.8 %  BMR: 1610 kcal        Physical Exam:  Physical Exam "   Constitutional: She is oriented to person, place, and time. She appears well-developed and well-nourished. No distress.   HENT:   Head: Normocephalic and atraumatic.   Mouth/Throat: No oropharyngeal exudate.   Eyes: Conjunctivae and EOM are normal. Pupils are equal, round, and reactive to light. No scleral icterus.   Neck: Normal range of motion. Neck supple. No JVD present. No tracheal deviation present. No thyromegaly present.   Cardiovascular: Normal rate, regular rhythm and normal heart sounds.  Exam reveals no gallop and no friction rub.    No murmur heard.  Pulmonary/Chest: Effort normal and breath sounds normal. No stridor. No respiratory distress. She has no wheezes. She has no rales. She exhibits no tenderness.   Abdominal: Soft. Bowel sounds are normal. She exhibits no distension and no mass. There is no tenderness. There is no rebound and no guarding.   Musculoskeletal: Normal range of motion. She exhibits no edema or tenderness.   Lymphadenopathy:     She has no cervical adenopathy.   Neurological: She is alert and oriented to person, place, and time. No cranial nerve deficit.   Skin: Skin is warm and dry. No rash noted. She is not diaphoretic. No erythema.   Psychiatric: She has a normal mood and affect. Her behavior is normal.   Nursing note and vitals reviewed.      ASSESSMENT/PLAN:        3/3 msd  Labs - low vitamin d- will replace  EKG- nsr  UGI - reflux, no hiatal hernia- will plan egd at time of surgery  dietary consult- done  psych consult - Saw Dr. Tracy Sanchez [place]- Cleared  Seminar- done    I will obtain the following clearances prior to surgery: none      1. Morbid obesity due to excess calories  Case Request Operating Room: GASTRECTOMY-SLEEVE-LAPAROSCOPIC   2. Morbid obesity with BMI of 40.0-44.9, adult  Case Request Operating Room: GASTRECTOMY-SLEEVE-LAPAROSCOPIC       Plan:  Wendy Millan has morbid obesity as their Body mass index is 40 kg/m². She would benefit  from weight loss surgery and has chosen gastric sleeve surgery as the preferred procedure. She understands that this is a tool and lifestyle change will be necessary to keep weight off. I went over possible complications of the chosen surgery with the patient and she is agreeable to surgery.    She has been instructed to start the pre operative diet start today    She surgical date is July 19, 2017.      The patient has been taught the post operative diet and understands that she will need to stay on this diet to prevent complication.  They are aware of the post operative follow up and return to see me after surgery to treat/prevent/identify any complications.  she has been advised to attend support groups post operatively.

## 2017-07-18 ENCOUNTER — ANESTHESIA EVENT (OUTPATIENT)
Dept: SURGERY | Facility: HOSPITAL | Age: 27
DRG: 621 | End: 2017-07-18
Payer: COMMERCIAL

## 2017-07-18 ENCOUNTER — DOCUMENTATION ONLY (OUTPATIENT)
Dept: BARIATRICS | Facility: CLINIC | Age: 27
End: 2017-07-18

## 2017-07-18 ENCOUNTER — HOSPITAL ENCOUNTER (OUTPATIENT)
Dept: PREADMISSION TESTING | Facility: HOSPITAL | Age: 27
Discharge: HOME | End: 2017-07-18
Attending: SURGERY
Payer: COMMERCIAL

## 2017-07-18 VITALS — BODY MASS INDEX: 37.36 KG/M2 | HEIGHT: 62 IN | WEIGHT: 203 LBS

## 2017-07-18 PROCEDURE — 99900104 DSU ONLY-NO CHARGE-EA ADD'L HR (STAT)

## 2017-07-18 PROCEDURE — 99900103 DSU ONLY-NO CHARGE-INITIAL HR (STAT)

## 2017-07-18 NOTE — NURSING
Pt weigh in before surgery    tanita scale: 203.8lb wt           37.3bmi            50% fat           102lb fat mass           101.8lb ffm           74.4lb tbw    Pt successfully return demonstrated use of incentive spirometer.

## 2017-07-19 ENCOUNTER — ANESTHESIA (OUTPATIENT)
Dept: SURGERY | Facility: HOSPITAL | Age: 27
DRG: 621 | End: 2017-07-19
Payer: COMMERCIAL

## 2017-07-19 ENCOUNTER — HOSPITAL ENCOUNTER (INPATIENT)
Facility: HOSPITAL | Age: 27
LOS: 2 days | Discharge: HOME | DRG: 621 | End: 2017-07-21
Attending: SURGERY | Admitting: SURGERY
Payer: COMMERCIAL

## 2017-07-19 DIAGNOSIS — Z98.84 S/P LAPAROSCOPIC SLEEVE GASTRECTOMY: ICD-10-CM

## 2017-07-19 DIAGNOSIS — E66.01 MORBID (SEVERE) OBESITY DUE TO EXCESS CALORIES: ICD-10-CM

## 2017-07-19 DIAGNOSIS — M25.511 RIGHT SHOULDER PAIN, UNSPECIFIED CHRONICITY: ICD-10-CM

## 2017-07-19 DIAGNOSIS — K21.9 GASTROESOPHAGEAL REFLUX DISEASE, ESOPHAGITIS PRESENCE NOT SPECIFIED: ICD-10-CM

## 2017-07-19 DIAGNOSIS — E66.01 MORBID OBESITY WITH BMI OF 40.0-44.9, ADULT: ICD-10-CM

## 2017-07-19 DIAGNOSIS — E66.01 MORBID OBESITY DUE TO EXCESS CALORIES: Primary | ICD-10-CM

## 2017-07-19 PROBLEM — N20.0 KIDNEY STONES: Status: ACTIVE | Noted: 2017-07-19

## 2017-07-19 LAB
B-HCG UR QL: NEGATIVE
CTP QC/QA: YES
POCT GLUCOSE: 146 MG/DL (ref 70–110)
POCT GLUCOSE: 163 MG/DL (ref 70–110)

## 2017-07-19 PROCEDURE — 63600175 PHARM REV CODE 636 W HCPCS: Performed by: SURGERY

## 2017-07-19 PROCEDURE — 63600175 PHARM REV CODE 636 W HCPCS: Performed by: NURSE ANESTHETIST, CERTIFIED REGISTERED

## 2017-07-19 PROCEDURE — 25000003 PHARM REV CODE 250: Performed by: SURGERY

## 2017-07-19 PROCEDURE — D9220A PRA ANESTHESIA: Mod: CRNA,,, | Performed by: NURSE ANESTHETIST, CERTIFIED REGISTERED

## 2017-07-19 PROCEDURE — 0DB64Z3 EXCISION OF STOMACH, PERCUTANEOUS ENDOSCOPIC APPROACH, VERTICAL: ICD-10-PCS | Performed by: SURGERY

## 2017-07-19 PROCEDURE — 99900103 DSU ONLY-NO CHARGE-INITIAL HR (STAT): Performed by: SURGERY

## 2017-07-19 PROCEDURE — D9220A PRA ANESTHESIA: Mod: ANES,,, | Performed by: ANESTHESIOLOGY

## 2017-07-19 PROCEDURE — 88307 TISSUE EXAM BY PATHOLOGIST: CPT | Performed by: PATHOLOGY

## 2017-07-19 PROCEDURE — 36000710: Performed by: SURGERY

## 2017-07-19 PROCEDURE — 99900035 HC TECH TIME PER 15 MIN (STAT)

## 2017-07-19 PROCEDURE — 99900104 DSU ONLY-NO CHARGE-EA ADD'L HR (STAT): Performed by: SURGERY

## 2017-07-19 PROCEDURE — 37000008 HC ANESTHESIA 1ST 15 MINUTES: Performed by: SURGERY

## 2017-07-19 PROCEDURE — 63600175 PHARM REV CODE 636 W HCPCS: Performed by: ANESTHESIOLOGY

## 2017-07-19 PROCEDURE — 11000001 HC ACUTE MED/SURG PRIVATE ROOM

## 2017-07-19 PROCEDURE — 99222 1ST HOSP IP/OBS MODERATE 55: CPT | Mod: ,,, | Performed by: INTERNAL MEDICINE

## 2017-07-19 PROCEDURE — 36000711: Performed by: SURGERY

## 2017-07-19 PROCEDURE — 94770 HC EXHALED C02 TEST: CPT

## 2017-07-19 PROCEDURE — 71000033 HC RECOVERY, INTIAL HOUR: Performed by: SURGERY

## 2017-07-19 PROCEDURE — 25000003 PHARM REV CODE 250: Performed by: NURSE ANESTHETIST, CERTIFIED REGISTERED

## 2017-07-19 PROCEDURE — 25000003 PHARM REV CODE 250: Performed by: ANESTHESIOLOGY

## 2017-07-19 PROCEDURE — 27201423 OPTIME MED/SURG SUP & DEVICES STERILE SUPPLY: Performed by: SURGERY

## 2017-07-19 PROCEDURE — 43775 LAP SLEEVE GASTRECTOMY: CPT | Mod: ,,, | Performed by: SURGERY

## 2017-07-19 PROCEDURE — 81025 URINE PREGNANCY TEST: CPT | Performed by: SURGERY

## 2017-07-19 PROCEDURE — 94799 UNLISTED PULMONARY SVC/PX: CPT

## 2017-07-19 PROCEDURE — 37000009 HC ANESTHESIA EA ADD 15 MINS: Performed by: SURGERY

## 2017-07-19 PROCEDURE — 27000221 HC OXYGEN, UP TO 24 HOURS

## 2017-07-19 PROCEDURE — 71000039 HC RECOVERY, EACH ADD'L HOUR: Performed by: SURGERY

## 2017-07-19 PROCEDURE — 43775 LAP SLEEVE GASTRECTOMY: CPT | Mod: 80,,, | Performed by: SURGERY

## 2017-07-19 PROCEDURE — 94761 N-INVAS EAR/PLS OXIMETRY MLT: CPT

## 2017-07-19 DEVICE — PERISTRIPS DRY STAPLE LINE: Type: IMPLANTABLE DEVICE | Site: ABDOMEN | Status: FUNCTIONAL

## 2017-07-19 RX ORDER — FAMOTIDINE 10 MG/ML
20 INJECTION INTRAVENOUS
Status: COMPLETED | OUTPATIENT
Start: 2017-07-19 | End: 2017-07-19

## 2017-07-19 RX ORDER — LIDOCAINE HCL/PF 100 MG/5ML
SYRINGE (ML) INTRAVENOUS
Status: DISCONTINUED | OUTPATIENT
Start: 2017-07-19 | End: 2017-07-19

## 2017-07-19 RX ORDER — CEFAZOLIN SODIUM 2 G/50ML
2 SOLUTION INTRAVENOUS
Status: COMPLETED | OUTPATIENT
Start: 2017-07-19 | End: 2017-07-20

## 2017-07-19 RX ORDER — PROMETHAZINE HYDROCHLORIDE 25 MG/ML
INJECTION, SOLUTION INTRAMUSCULAR; INTRAVENOUS
Status: DISCONTINUED | OUTPATIENT
Start: 2017-07-19 | End: 2017-07-19

## 2017-07-19 RX ORDER — FAMOTIDINE 10 MG/ML
20 INJECTION INTRAVENOUS EVERY 12 HOURS
Status: DISCONTINUED | OUTPATIENT
Start: 2017-07-19 | End: 2017-07-21 | Stop reason: HOSPADM

## 2017-07-19 RX ORDER — GLYCOPYRROLATE 0.2 MG/ML
INJECTION INTRAMUSCULAR; INTRAVENOUS
Status: DISCONTINUED | OUTPATIENT
Start: 2017-07-19 | End: 2017-07-19

## 2017-07-19 RX ORDER — SODIUM CHLORIDE, SODIUM LACTATE, POTASSIUM CHLORIDE, CALCIUM CHLORIDE 600; 310; 30; 20 MG/100ML; MG/100ML; MG/100ML; MG/100ML
INJECTION, SOLUTION INTRAVENOUS CONTINUOUS
Status: DISCONTINUED | OUTPATIENT
Start: 2017-07-19 | End: 2017-07-20

## 2017-07-19 RX ORDER — LIDOCAINE HYDROCHLORIDE 10 MG/ML
1 INJECTION, SOLUTION EPIDURAL; INFILTRATION; INTRACAUDAL; PERINEURAL ONCE
Status: COMPLETED | OUTPATIENT
Start: 2017-07-19 | End: 2017-07-19

## 2017-07-19 RX ORDER — VECURONIUM BROMIDE FOR INJECTION 1 MG/ML
INJECTION, POWDER, LYOPHILIZED, FOR SOLUTION INTRAVENOUS
Status: DISCONTINUED | OUTPATIENT
Start: 2017-07-19 | End: 2017-07-19

## 2017-07-19 RX ORDER — GLUCAGON 1 MG
1 KIT INJECTION
Status: DISCONTINUED | OUTPATIENT
Start: 2017-07-19 | End: 2017-07-21 | Stop reason: HOSPADM

## 2017-07-19 RX ORDER — HYDROMORPHONE HYDROCHLORIDE 2 MG/ML
0.2 INJECTION, SOLUTION INTRAMUSCULAR; INTRAVENOUS; SUBCUTANEOUS EVERY 5 MIN PRN
Status: DISCONTINUED | OUTPATIENT
Start: 2017-07-19 | End: 2017-07-19 | Stop reason: HOSPADM

## 2017-07-19 RX ORDER — DIPHENHYDRAMINE HYDROCHLORIDE 50 MG/ML
12.5 INJECTION INTRAMUSCULAR; INTRAVENOUS EVERY 4 HOURS PRN
Status: DISCONTINUED | OUTPATIENT
Start: 2017-07-19 | End: 2017-07-21 | Stop reason: HOSPADM

## 2017-07-19 RX ORDER — ENOXAPARIN SODIUM 100 MG/ML
40 INJECTION SUBCUTANEOUS
Status: DISCONTINUED | OUTPATIENT
Start: 2017-07-20 | End: 2017-07-21 | Stop reason: HOSPADM

## 2017-07-19 RX ORDER — INSULIN ASPART 100 [IU]/ML
0-5 INJECTION, SOLUTION INTRAVENOUS; SUBCUTANEOUS EVERY 6 HOURS PRN
Status: DISCONTINUED | OUTPATIENT
Start: 2017-07-19 | End: 2017-07-21 | Stop reason: HOSPADM

## 2017-07-19 RX ORDER — SCOLOPAMINE TRANSDERMAL SYSTEM 1 MG/1
1 PATCH, EXTENDED RELEASE TRANSDERMAL ONCE
Status: COMPLETED | OUTPATIENT
Start: 2017-07-19 | End: 2017-07-19

## 2017-07-19 RX ORDER — DIAZEPAM 10 MG/2ML
2 INJECTION INTRAMUSCULAR EVERY 4 HOURS PRN
Status: DISCONTINUED | OUTPATIENT
Start: 2017-07-19 | End: 2017-07-21 | Stop reason: HOSPADM

## 2017-07-19 RX ORDER — HYDROCODONE BITARTRATE AND ACETAMINOPHEN 7.5; 325 MG/15ML; MG/15ML
15 SOLUTION ORAL EVERY 4 HOURS PRN
Status: DISCONTINUED | OUTPATIENT
Start: 2017-07-19 | End: 2017-07-21 | Stop reason: HOSPADM

## 2017-07-19 RX ORDER — MIDAZOLAM HYDROCHLORIDE 1 MG/ML
INJECTION, SOLUTION INTRAMUSCULAR; INTRAVENOUS
Status: DISCONTINUED | OUTPATIENT
Start: 2017-07-19 | End: 2017-07-19

## 2017-07-19 RX ORDER — FENTANYL CITRATE 50 UG/ML
INJECTION, SOLUTION INTRAMUSCULAR; INTRAVENOUS
Status: DISCONTINUED | OUTPATIENT
Start: 2017-07-19 | End: 2017-07-19

## 2017-07-19 RX ORDER — ONDANSETRON 2 MG/ML
8 INJECTION INTRAMUSCULAR; INTRAVENOUS EVERY 6 HOURS PRN
Status: DISCONTINUED | OUTPATIENT
Start: 2017-07-19 | End: 2017-07-21 | Stop reason: HOSPADM

## 2017-07-19 RX ORDER — HYDROMORPHONE HCL IN 0.9% NACL 6 MG/30 ML
PATIENT CONTROLLED ANALGESIA SYRINGE INTRAVENOUS CONTINUOUS
Status: DISCONTINUED | OUTPATIENT
Start: 2017-07-19 | End: 2017-07-20

## 2017-07-19 RX ORDER — NALOXONE HCL 0.4 MG/ML
0.02 VIAL (ML) INJECTION
Status: DISCONTINUED | OUTPATIENT
Start: 2017-07-19 | End: 2017-07-21 | Stop reason: HOSPADM

## 2017-07-19 RX ORDER — SUCCINYLCHOLINE CHLORIDE 20 MG/ML
INJECTION INTRAMUSCULAR; INTRAVENOUS
Status: DISCONTINUED | OUTPATIENT
Start: 2017-07-19 | End: 2017-07-19

## 2017-07-19 RX ORDER — PROPOFOL 10 MG/ML
VIAL (ML) INTRAVENOUS
Status: DISCONTINUED | OUTPATIENT
Start: 2017-07-19 | End: 2017-07-19

## 2017-07-19 RX ORDER — SODIUM CHLORIDE, SODIUM LACTATE, POTASSIUM CHLORIDE, CALCIUM CHLORIDE 600; 310; 30; 20 MG/100ML; MG/100ML; MG/100ML; MG/100ML
1000 INJECTION, SOLUTION INTRAVENOUS CONTINUOUS
Status: DISCONTINUED | OUTPATIENT
Start: 2017-07-19 | End: 2017-07-20

## 2017-07-19 RX ORDER — PHENYLEPHRINE HYDROCHLORIDE 10 MG/ML
INJECTION INTRAVENOUS
Status: DISCONTINUED | OUTPATIENT
Start: 2017-07-19 | End: 2017-07-19

## 2017-07-19 RX ORDER — NEOSTIGMINE METHYLSULFATE 1 MG/ML
INJECTION, SOLUTION INTRAVENOUS
Status: DISCONTINUED | OUTPATIENT
Start: 2017-07-19 | End: 2017-07-19

## 2017-07-19 RX ORDER — ACETAMINOPHEN 10 MG/ML
INJECTION, SOLUTION INTRAVENOUS
Status: DISCONTINUED | OUTPATIENT
Start: 2017-07-19 | End: 2017-07-19

## 2017-07-19 RX ORDER — DEXAMETHASONE SODIUM PHOSPHATE 4 MG/ML
INJECTION, SOLUTION INTRA-ARTICULAR; INTRALESIONAL; INTRAMUSCULAR; INTRAVENOUS; SOFT TISSUE
Status: DISCONTINUED | OUTPATIENT
Start: 2017-07-19 | End: 2017-07-19

## 2017-07-19 RX ORDER — HEPARIN SODIUM 5000 [USP'U]/ML
5000 INJECTION, SOLUTION INTRAVENOUS; SUBCUTANEOUS
Status: COMPLETED | OUTPATIENT
Start: 2017-07-19 | End: 2017-07-19

## 2017-07-19 RX ORDER — ONDANSETRON 2 MG/ML
INJECTION INTRAMUSCULAR; INTRAVENOUS
Status: DISCONTINUED | OUTPATIENT
Start: 2017-07-19 | End: 2017-07-19

## 2017-07-19 RX ORDER — POLYETHYLENE GLYCOL 3350 17 G/17G
POWDER, FOR SOLUTION ORAL
COMMUNITY
End: 2017-07-27

## 2017-07-19 RX ADMIN — MIDAZOLAM 2 MG: 1 INJECTION INTRAMUSCULAR; INTRAVENOUS at 01:07

## 2017-07-19 RX ADMIN — PROPOFOL 150 MG: 10 INJECTION, EMULSION INTRAVENOUS at 01:07

## 2017-07-19 RX ADMIN — PHENYLEPHRINE HYDROCHLORIDE 100 MCG: 10 INJECTION INTRAVENOUS at 01:07

## 2017-07-19 RX ADMIN — SODIUM CHLORIDE, SODIUM LACTATE, POTASSIUM CHLORIDE, AND CALCIUM CHLORIDE: .6; .31; .03; .02 INJECTION, SOLUTION INTRAVENOUS at 02:07

## 2017-07-19 RX ADMIN — ONDANSETRON 8 MG: 2 INJECTION INTRAMUSCULAR; INTRAVENOUS at 07:07

## 2017-07-19 RX ADMIN — NEOSTIGMINE METHYLSULFATE 5 MG: 1 INJECTION INTRAVENOUS at 02:07

## 2017-07-19 RX ADMIN — LIDOCAINE HYDROCHLORIDE 10 MG: 10 INJECTION, SOLUTION EPIDURAL; INFILTRATION; INTRACAUDAL; PERINEURAL at 10:07

## 2017-07-19 RX ADMIN — PROMETHAZINE HYDROCHLORIDE 6.25 MG: 25 INJECTION INTRAMUSCULAR; INTRAVENOUS at 01:07

## 2017-07-19 RX ADMIN — DEXTROSE 3 G: 50 INJECTION, SOLUTION INTRAVENOUS at 01:07

## 2017-07-19 RX ADMIN — SCOPALAMINE 1.5 MG: 1 PATCH, EXTENDED RELEASE TRANSDERMAL at 11:07

## 2017-07-19 RX ADMIN — VECURONIUM BROMIDE FOR INJECTION 4 MG: 1 INJECTION, POWDER, LYOPHILIZED, FOR SOLUTION INTRAVENOUS at 01:07

## 2017-07-19 RX ADMIN — SODIUM CHLORIDE, SODIUM LACTATE, POTASSIUM CHLORIDE, AND CALCIUM CHLORIDE: .6; .31; .03; .02 INJECTION, SOLUTION INTRAVENOUS at 01:07

## 2017-07-19 RX ADMIN — Medication: at 02:07

## 2017-07-19 RX ADMIN — SUCCINYLCHOLINE CHLORIDE 160 MG: 20 INJECTION, SOLUTION INTRAMUSCULAR; INTRAVENOUS at 01:07

## 2017-07-19 RX ADMIN — FAMOTIDINE 20 MG: 10 INJECTION, SOLUTION INTRAVENOUS at 08:07

## 2017-07-19 RX ADMIN — CEFAZOLIN SODIUM 2 G: 2 SOLUTION INTRAVENOUS at 07:07

## 2017-07-19 RX ADMIN — DEXAMETHASONE SODIUM PHOSPHATE 4 MG: 4 INJECTION, SOLUTION INTRAMUSCULAR; INTRAVENOUS at 01:07

## 2017-07-19 RX ADMIN — LIDOCAINE HYDROCHLORIDE 100 MG: 20 INJECTION, SOLUTION INTRAVENOUS at 01:07

## 2017-07-19 RX ADMIN — HEPARIN SODIUM 5000 UNITS: 5000 INJECTION, SOLUTION INTRAVENOUS; SUBCUTANEOUS at 11:07

## 2017-07-19 RX ADMIN — FAMOTIDINE 20 MG: 10 INJECTION, SOLUTION INTRAVENOUS at 11:07

## 2017-07-19 RX ADMIN — ACETAMINOPHEN 1000 MG: 10 INJECTION, SOLUTION INTRAVENOUS at 01:07

## 2017-07-19 RX ADMIN — FENTANYL CITRATE 50 MCG: 50 INJECTION INTRAMUSCULAR; INTRAVENOUS at 01:07

## 2017-07-19 RX ADMIN — VECURONIUM BROMIDE FOR INJECTION 2 MG: 1 INJECTION, POWDER, LYOPHILIZED, FOR SOLUTION INTRAVENOUS at 01:07

## 2017-07-19 RX ADMIN — ONDANSETRON 4 MG: 2 INJECTION, SOLUTION INTRAMUSCULAR; INTRAVENOUS at 01:07

## 2017-07-19 RX ADMIN — FENTANYL CITRATE 100 MCG: 50 INJECTION INTRAMUSCULAR; INTRAVENOUS at 01:07

## 2017-07-19 RX ADMIN — GLYCOPYRROLATE 0.8 MG: 0.2 INJECTION, SOLUTION INTRAMUSCULAR; INTRAVENOUS at 02:07

## 2017-07-19 RX ADMIN — PROMETHAZINE HYDROCHLORIDE 6.25 MG: 25 INJECTION INTRAMUSCULAR; INTRAVENOUS at 03:07

## 2017-07-19 RX ADMIN — SODIUM CHLORIDE, SODIUM LACTATE, POTASSIUM CHLORIDE, AND CALCIUM CHLORIDE: .6; .31; .03; .02 INJECTION, SOLUTION INTRAVENOUS at 10:07

## 2017-07-19 NOTE — PLAN OF CARE
Problem: Patient Care Overview  Goal: Plan of Care Review  Outcome: Ongoing (interventions implemented as appropriate)  ISU and instruct with IS X 10 breaths, Pt achieved 2750cc and instructed to use Q4. 99% sats on room air with 36 mmHg ETCO@. Pt put to room air with sats decreasing to 90% and put Pt back on O2 with sats increasing to 96%, nurse notified.

## 2017-07-19 NOTE — BRIEF OP NOTE
[facility]  Brief Operative Note    SUMMARY     Surgery Date: 7/19/2017     Surgeon(s) and Role:     * Jak Salmeron MD - Assisting     * Tanner Hook MD - Primary        Pre-op Diagnosis:  Morbid obesity with BMI of 40.0-44.9, adult [E66.01, Z68.41]  Morbid obesity due to excess calories [E66.01]    Post-op Diagnosis:  Post-Op Diagnosis Codes:     * Morbid obesity with BMI of 40.0-44.9, adult [E66.01, Z68.41]     * Morbid obesity due to excess calories [E66.01]    Procedure(s) (LRB):  GASTRECTOMY-SLEEVE-LAPAROSCOPIC (N/A)    Anesthesia: General    Description of Procedure: sleeve gastrectomy    Description of the findings of the procedure: normal anatomy    Estimated Blood Loss: 10.0 cc         Specimens:   Specimen (12h ago through future)    Start     Ordered    07/19/17 1335  Specimen to Pathology - Surgery  Once     Comments:  Pre-op Diagnosis: Morbid obesity with BMI of 40.0-44.9, adult [E66.01, Z68.41]Morbid obesity due to excess calories [E66.01]Post-op Diagnosis: sameProcedure(s):GASTRECTOMY-SLEEVE-LAPAROSCOPIC Number of specimens: 1Name of specimens: 1) Gastric Bypass Resection      07/19/17 8884

## 2017-07-19 NOTE — NURSING
Pt here from pacu, s/p gastric sleeve, awake drowsy, nad noted, pca in use, family at [facility], will monitor, vss

## 2017-07-19 NOTE — H&P (VIEW-ONLY)
Follow up    SUBJECTIVE:     Chief Complaint   Patient presents with    Obesity       History of Present Illness:    Patient is a 26 y.o. female who is referred for evaluation of surgical treatment of morbid obesity. Her Body mass index is 39.43 kg/m².  She has known comorbidities of GERD. She has attended the bariatric seminar and is most interested in gastric sleeve surgery.    Diet:   Lots of fish with green beans, baked beans  Eating chicken as well  Cheated on fourth of July- alcohol and other meats    Exercise:     Less exercise classes - planning to return    MVI:  daily    Review of patient's allergies indicates:  No Known Allergies    Current Outpatient Prescriptions   Medication Sig Dispense Refill    diphenhydrAMINE (BENADRYL) 25 mg capsule Take 25 mg by mouth every 6 (six) hours as needed for Itching.      lisdexamfetamine (VYVANSE) 50 MG capsule Take 50 mg by mouth every morning.      MULTIVITAMIN/IRON/FOLIC ACID (CENTRUM ULTRA WOMEN'S ORAL) Take 1 tablet by mouth once daily at 6am.      omeprazole (PRILOSEC) 40 MG capsule Take 1 capsule (40 mg total) by mouth once daily. 90 capsule 2    tazarotene (TAZORAC) 0.05 % Crea cream Apply topically once daily. (Patient taking differently: Apply topically as needed. ) 30 g 0     No current facility-administered medications for this visit.        Past Medical History:   Diagnosis Date    GERD (gastroesophageal reflux disease)     Kidney stones      Past Surgical History:   Procedure Laterality Date    EXTRACORPOREAL SHOCK WAVE LITHOTRIPSY      with stent placement    WISDOM TOOTH EXTRACTION       Family History   Problem Relation Age of Onset    Mitral valve prolapse Mother     Migraines Mother     Allergic rhinitis Mother     Asthma Mother     Colon polyps Mother     Hypertension Father     Hypertension Maternal Grandfather     Diabetes Paternal Grandmother     Angioedema Neg Hx     Eczema Neg Hx     Immunodeficiency Neg Hx     Urticaria  "Neg Hx     Breast cancer Neg Hx     Ovarian cancer Neg Hx      Social History   Substance Use Topics    Smoking status: Never Smoker    Smokeless tobacco: Never Used    Alcohol use Yes      Comment: social        Review of Systems:  Review of Systems   Constitutional: Positive for fatigue. Negative for activity change, appetite change, fever and unexpected weight change.   HENT: Positive for sinus pressure. Negative for congestion, sneezing, sore throat, tinnitus and voice change.    Eyes: Negative for redness, itching and visual disturbance.   Respiratory: Negative for apnea, cough, choking, chest tightness, shortness of breath, wheezing and stridor.    Cardiovascular: Negative for chest pain, palpitations and leg swelling.   Gastrointestinal: Negative for abdominal distention, abdominal pain, anal bleeding, blood in stool, constipation, diarrhea, nausea, rectal pain and vomiting.   Endocrine: Negative for cold intolerance and heat intolerance.   Genitourinary: Negative for difficulty urinating and dysuria.   Musculoskeletal: Negative for arthralgias, back pain, gait problem, joint swelling, myalgias, neck pain and neck stiffness.   Skin: Positive for rash. Negative for wound.        Psoriasis mainly on eye lids and sweaty areas or on head   Allergic/Immunologic: Positive for environmental allergies. Negative for food allergies.   Neurological: Positive for headaches. Negative for dizziness and light-headedness.   Hematological: Positive for adenopathy. Does not bruise/bleed easily.   Psychiatric/Behavioral: Negative for agitation and confusion.       OBJECTIVE:     Vital Signs (Most Recent)  Temp: 98.3 °F (36.8 °C) (07/07/17 1406)  Pulse: 87 (07/07/17 1406)  Resp: 18 (07/07/17 1406)  BP: 114/71 (07/07/17 1406)  5' 2" (1.575 m)  97.8 kg (215 lb 9.6 oz)     Body comp:  Fat Percent:  49.8 %  Fat Mass:  107.4 lb  FFM:  108.2 lb  TBW: 79.4 lb  TBW %:  36.8 %  BMR: 1610 kcal        Physical Exam:  Physical Exam "   Constitutional: She is oriented to person, place, and time. She appears well-developed and well-nourished. No distress.   HENT:   Head: Normocephalic and atraumatic.   Mouth/Throat: No oropharyngeal exudate.   Eyes: Conjunctivae and EOM are normal. Pupils are equal, round, and reactive to light. No scleral icterus.   Neck: Normal range of motion. Neck supple. No JVD present. No tracheal deviation present. No thyromegaly present.   Cardiovascular: Normal rate, regular rhythm and normal heart sounds.  Exam reveals no gallop and no friction rub.    No murmur heard.  Pulmonary/Chest: Effort normal and breath sounds normal. No stridor. No respiratory distress. She has no wheezes. She has no rales. She exhibits no tenderness.   Abdominal: Soft. Bowel sounds are normal. She exhibits no distension and no mass. There is no tenderness. There is no rebound and no guarding.   Musculoskeletal: Normal range of motion. She exhibits no edema or tenderness.   Lymphadenopathy:     She has no cervical adenopathy.   Neurological: She is alert and oriented to person, place, and time. No cranial nerve deficit.   Skin: Skin is warm and dry. No rash noted. She is not diaphoretic. No erythema.   Psychiatric: She has a normal mood and affect. Her behavior is normal.   Nursing note and vitals reviewed.      ASSESSMENT/PLAN:        3/3 msd  Labs - low vitamin d- will replace  EKG- nsr  UGI - reflux, no hiatal hernia- will plan egd at time of surgery  dietary consult- done  psych consult - Saw Dr. Tracy Sanchez Hutchinson- Cleared  Seminar- done    I will obtain the following clearances prior to surgery: none      1. Morbid obesity due to excess calories     2. Morbid obesity with BMI of 40.0-44.9, adult         Plan:  Wendy Millan has morbid obesity as their Body mass index is 39.43 kg/m². She would benefit from weight loss surgery and has chosen gastric sleeve surgery as the preferred procedure. She understands that this is  a tool and lifestyle change will be necessary to keep weight off. I went over possible complications of the chosen surgery with the patient and she is agreeable to surgery.    She has been instructed to start the pre operative diet start today    She surgical date is July 19, 2017.      The patient has been taught the post operative diet and understands that she will need to stay on this diet to prevent complication.  They are aware of the post operative follow up and return to see me after surgery to treat/prevent/identify any complications.  she has been advised to attend support groups post operatively.

## 2017-07-19 NOTE — ANESTHESIA POSTPROCEDURE EVALUATION
"Anesthesia Post Evaluation    Patient: Wendy Millan    Procedure(s) Performed: Procedure(s) (LRB):  GASTRECTOMY-SLEEVE-LAPAROSCOPIC (N/A)    Final Anesthesia Type: general  Patient location during evaluation: PACU  Patient participation: Yes- Able to Participate  Level of consciousness: awake and alert and oriented  Post-procedure vital signs: reviewed and stable  Pain management: adequate  Airway patency: patent  PONV status at discharge: No PONV  Anesthetic complications: no      Cardiovascular status: hemodynamically stable and blood pressure returned to baseline  Respiratory status: unassisted, spontaneous ventilation and room air  Hydration status: euvolemic  Follow-up not needed.        Visit Vitals  /89   Pulse 66   Temp 36.5 °C (97.7 °F) (Temporal)   Resp (!) 24   Ht 5' 2" (1.575 m)   Wt 92.4 kg (203 lb 12.8 oz)   LMP 07/12/2017 (Exact Date)   SpO2 98%   Breastfeeding? No   BMI 37.28 kg/m²       Pain/Samantha Score: Pain Assessment Performed: Yes (7/19/2017  3:40 PM)  Presence of Pain: complains of pain/discomfort (7/19/2017  3:40 PM)  Pain Rating Prior to Med Admin: 6 (7/19/2017  3:40 PM)  Samantha Score: 8 (7/19/2017  3:40 PM)      "

## 2017-07-19 NOTE — ANESTHESIA PREPROCEDURE EVALUATION
07/19/2017  Wendy Millan is a 26 y.o., female.    Anesthesia Evaluation    I have reviewed the Patient Summary Reports.    I have reviewed the Nursing Notes.   I have reviewed the Medications.     Review of Systems  Anesthesia Hx:  Hx of Anesthetic complications PONV   Social:  Non-Smoker    Hematology/Oncology:  Hematology Normal   Oncology Normal     EENT/Dental:EENT/Dental Normal   Cardiovascular:  Cardiovascular Normal     Pulmonary:  Pulmonary Normal    Renal/:   Chronic Renal Disease    Hepatic/GI:   GERD    Musculoskeletal:  Musculoskeletal Normal    Neurological:   Headaches    Endocrine:  Endocrine Normal    Dermatological:  Skin Normal    Psych:  Psychiatric Normal           Physical Exam  General:  Morbid Obesity    Airway/Jaw/Neck:  Airway Findings: Mouth Opening: Normal Tongue: Normal  General Airway Assessment: Adult  Mallampati: I  TM Distance: Normal, at least 6 cm        Eyes/Ears/Nose:  EYES/EARS/NOSE FINDINGS: Normal   Dental:  DENTAL FINDINGS: Normal   Chest/Lungs:  Chest/Lungs Findings: Clear to auscultation, Normal Respiratory Rate     Heart/Vascular:  Heart Findings: Rate: Normal  Rhythm: Regular Rhythm  Sounds: Normal  Heart murmur: negative Vascular Findings: Normal    Abdomen:  Abdomen Findings: Normal    Musculoskeletal:  Musculoskeletal Findings: Normal   Skin:  Skin Findings: Normal    Mental Status:  Mental Status Findings: Normal        Anesthesia Plan  Type of Anesthesia, risks & benefits discussed:  Anesthesia Type:  general  Patient's Preference:   Intra-op Monitoring Plan: standard ASA monitors  Intra-op Monitoring Plan Comments:   Post Op Pain Control Plan: IV/PO Opioids PRN and per primary service following discharge from PACU  Post Op Pain Control Plan Comments:   Induction:   IV  Beta Blocker:  Patient is not currently on a Beta-Blocker (No further  documentation required).       Informed Consent: Patient understands risks and agrees with Anesthesia plan.  Questions answered. Anesthesia consent signed with patient.  ASA Score: 2     Day of Surgery Review of History & Physical: I have interviewed and examined the patient. I have reviewed the patient's H&P dated:    H&P update referred to the surgeon.         Ready For Surgery From Anesthesia Perspective.

## 2017-07-19 NOTE — H&P
PCP: Rosa Thomason NP    History & Physical    Chief Complaint: s/p gastric sleeve surgery    History of Present Illness:  Patient is a 26 y.o. female admitted to Hospitalist Service from Operation Room s/p gastric sleeve surgery performed by Dr. Hook. Patient reportedly has past medical history significant for morbid obesity, GERD and history of nephrolithiasis. Post-operatively, she denied chest pain, shortness of breath, headache, vision changes, focal neuro-deficits, cough or fever.    Past Medical History:   Diagnosis Date    GERD (gastroesophageal reflux disease)     Kidney stones     Spinal headache      Past Surgical History:   Procedure Laterality Date    EXTRACORPOREAL SHOCK WAVE LITHOTRIPSY      with stent placement    WISDOM TOOTH EXTRACTION       Family History   Problem Relation Age of Onset    Mitral valve prolapse Mother     Migraines Mother     Allergic rhinitis Mother     Asthma Mother     Colon polyps Mother     Hypertension Father     Hypertension Maternal Grandfather     Diabetes Paternal Grandmother     Angioedema Neg Hx     Eczema Neg Hx     Immunodeficiency Neg Hx     Urticaria Neg Hx     Breast cancer Neg Hx     Ovarian cancer Neg Hx      Social History   Substance Use Topics    Smoking status: Never Smoker    Smokeless tobacco: Never Used    Alcohol use Yes      Comment: social      Review of patient's allergies indicates:  No Known Allergies  PTA Medications   Medication Sig    MULTIVITAMIN/IRON/FOLIC ACID (CENTRUM ULTRA WOMEN'S ORAL) Take 1 tablet by mouth once daily at 6am.    omeprazole (PRILOSEC) 40 MG capsule Take 1 capsule (40 mg total) by mouth once daily.    polyethylene glycol (GLYCOLAX) 17 gram PwPk Take by mouth.    diphenhydrAMINE (BENADRYL) 25 mg capsule Take 25 mg by mouth every 6 (six) hours as needed for Itching.    lisdexamfetamine (VYVANSE) 50 MG capsule Take 50 mg by mouth every morning.    tazarotene (TAZORAC) 0.05 % Crea cream Apply  topically once daily. (Patient taking differently: Apply topically as needed. )     Review of Systems:  Constitutional: no fever or chills  Eyes: no visual changes  Ears, nose, mouth, throat, and face: no nasal congestion or sore throat  Respiratory: no cough or shorness of breath  Cardiovascular: no chest pain or palpitations  Gastrointestinal: see HPI  Genitourinary: no hematuria or dysuria  Integument/breast: no rash or pruritis  Hematologic/lymphatic: no easy bruising or lymphadenopathy  Musculoskeletal: no arthralgias or myalgias  Neurological: no seizures or tremors.  Behavioral/Psych: no auditory or visual hallucinations  Endocrine: no heat or cold intolerance     OBJECTIVE:     Vital Signs (Most Recent)  Temp: 97.7 °F (36.5 °C) (07/19/17 1535)  Pulse: 66 (07/19/17 1535)  Resp: (!) 24 (07/19/17 1535)  BP: 135/89 (07/19/17 1535)  SpO2: 98 % (07/19/17 1535)    Physical Exam:  General appearance: well developed, appears stated age  Head: normocephalic, atraumatic  Eyes:  conjunctivae/corneas clear. PERRL.  Nose: Nares normal. Septum midline.  Throat: lips, mucosa, and tongue normal; teeth and gums normal, no throat erythema.  Neck: supple, symmetrical, trachea midline, no JVD and thyroid not enlarged, symmetric, no tenderness/mass/nodules  Lungs:  clear to auscultation bilaterally and normal respiratory effort  Chest wall: no tenderness  Heart: regular rate and rhythm, S1, S2 normal, no murmur, click, rub or gallop  Abdomen: soft, non-tender non-distented; bowel sounds normal; no masses,  no organomegaly. Abdominal dressing C/D/I.  Extremities: no cyanosis, clubbing or edema.   Pulses: 2+ and symmetric  Skin: Skin color, texture, turgor normal. No rashes or lesions.  Lymph nodes: Cervical, supraclavicular, and axillary nodes normal.  Neurologic: Normal strength and tone. No focal numbness or weakness. CNII-XII intact.      Laboratory:   CBC: No results for input(s): WBC, RBC, HGB, HCT, PLT, MCV, MCH, MCHC in the  last 168 hours.  BMP: No results for input(s): GLU, NA, K, CL, CO2, BUN, CREATININE, CALCIUM, MG in the last 168 hours.    Hemoglobin A1C   Date Value Ref Range Status   05/18/2017 5.2 4.5 - 6.2 % Final     Comment:     According to ADA guidelines, hemoglobin A1C <7.0% represents  optimal control in non-pregnant diabetic patients.  Different  metrics may apply to specific populations.   Standards of Medical Care in Diabetes - 2016.  For the purpose of screening for the presence of diabetes:  <5.7%     Consistent with the absence of diabetes  5.7-6.4%  Consistent with increasing risk for diabetes   (prediabetes)  >or=6.5%  Consistent with diabetes  Currently no consensus exists for use of hemoglobin A1C  for diagnosis of diabetes for children.     10/03/2016 5.3 4.5 - 6.2 % Final     Comment:     According to ADA guidelines, hemoglobin A1C <7.0% represents  optimal control in non-pregnant diabetic patients.  Different  metrics may apply to specific populations.   Standards of Medical Care in Diabetes - 2016.  For the purpose of screening for the presence of diabetes:  <5.7%     Consistent with the absence of diabetes  5.7-6.4%  Consistent with increasing risk for diabetes   (prediabetes)  >or=6.5%  Consistent with diabetes  Currently no consensus exists for use of hemoglobin A1C  for diagnosis of diabetes for children.       Microbiology Results (last 7 days)     ** No results found for the last 168 hours. **        Diagnostic Results: None    Assessment/Plan:     Active Hospital Problems    Diagnosis  POA    *S/P laparoscopic sleeve gastrectomy [Z98.84]  Not Applicable    GERD (gastroesophageal reflux disease) [K21.9]  Yes    History of nephrolithiasis [N20.0]  Yes    Morbid obesity due to excess calories [E66.01]  Yes      Resolved Hospital Problems    Diagnosis Date Resolved POA   No resolved problems to display.   Tele-monitoring.  Continue to follow Dr. Hook's recommendations.  Needs aggressive incentive  spirometry.  Follow hemoglobin and hematocrit closely.  Pain control with IV narcotics and antiemetics as needed.  Observe fall precautions.  Check blood glucose level q AC/HS.  Use Novolog Insulin Sliding Scale as needed.   Continue routine medications as before.   Gastric ulcer prophylaxis with gastric acid suppressant.  Deep venous thrombosis prophylaxis with Lovenox 40 mg SQ daily.  See Orders.    VTE Risk Mitigation         Ordered     enoxaparin injection 40 mg  Every 12 hours (non-standard times)     Route:  Subcutaneous        07/19/17 1422     Place MARICEL hose  Until discontinued      07/19/17 1422     Place sequential compression device  Until discontinued      07/19/17 1422     Medium Risk of VTE  Once      07/19/17 1422     Place sequential compression device  Until discontinued      07/19/17 1027     Place MARICEL hose  Until discontinued      07/19/17 1027        Dottie Mendoza MD  Department of Hospital Medicine   [facility]

## 2017-07-19 NOTE — TRANSFER OF CARE
"Anesthesia Transfer of Care Note    Patient: Wendy Millan    Procedure(s) Performed: Procedure(s) (LRB):  GASTRECTOMY-SLEEVE-LAPAROSCOPIC (N/A)    Patient location: PACU    Transport from OR: Transported from OR on 2-3 L/min O2 by NC with adequate spontaneous ventilation    Post pain: adequate analgesia    Post assessment: no apparent anesthetic complications and tolerated procedure well    Post vital signs: stable    Level of consciousness: awake    Nausea/Vomiting: no nausea/vomiting and nausea    Complications: none    Transfer of care protocol was followed      Last vitals:   Visit Vitals  /87 (BP Location: Right arm, Patient Position: Lying, BP Method: Automatic)   Pulse 84   Temp 36.8 °C (98.2 °F) (Temporal)   Resp 18   Ht 5' 2" (1.575 m)   Wt 92.4 kg (203 lb 12.8 oz)   LMP 07/12/2017 (Exact Date)   SpO2 98%   Breastfeeding? No   BMI 37.28 kg/m²     "

## 2017-07-20 LAB
ANION GAP SERPL CALC-SCNC: 12 MMOL/L
BASOPHILS # BLD AUTO: 0 K/UL
BASOPHILS NFR BLD: 0.4 %
BUN SERPL-MCNC: 5 MG/DL
CALCIUM SERPL-MCNC: 9.5 MG/DL
CHLORIDE SERPL-SCNC: 105 MMOL/L
CO2 SERPL-SCNC: 25 MMOL/L
CREAT SERPL-MCNC: 0.7 MG/DL
DIFFERENTIAL METHOD: ABNORMAL
EOSINOPHIL # BLD AUTO: 0 K/UL
EOSINOPHIL NFR BLD: 0.1 %
ERYTHROCYTE [DISTWIDTH] IN BLOOD BY AUTOMATED COUNT: 14.4 %
EST. GFR  (AFRICAN AMERICAN): >60 ML/MIN/1.73 M^2
EST. GFR  (NON AFRICAN AMERICAN): >60 ML/MIN/1.73 M^2
GLUCOSE SERPL-MCNC: 105 MG/DL
HCT VFR BLD AUTO: 38.9 %
HGB BLD-MCNC: 13 G/DL
LYMPHOCYTES # BLD AUTO: 0.8 K/UL
LYMPHOCYTES NFR BLD: 7 %
MAGNESIUM SERPL-MCNC: 2 MG/DL
MCH RBC QN AUTO: 27 PG
MCHC RBC AUTO-ENTMCNC: 33.5 G/DL
MCV RBC AUTO: 81 FL
MONOCYTES # BLD AUTO: 0.7 K/UL
MONOCYTES NFR BLD: 6.2 %
NEUTROPHILS # BLD AUTO: 9.7 K/UL
NEUTROPHILS NFR BLD: 86.3 %
PHOSPHATE SERPL-MCNC: 3.2 MG/DL
PLATELET # BLD AUTO: 270 K/UL
PMV BLD AUTO: 8.6 FL
POCT GLUCOSE: 86 MG/DL (ref 70–110)
POTASSIUM SERPL-SCNC: 4 MMOL/L
RBC # BLD AUTO: 4.82 M/UL
SODIUM SERPL-SCNC: 142 MMOL/L
WBC # BLD AUTO: 11.3 K/UL

## 2017-07-20 PROCEDURE — 97161 PT EVAL LOW COMPLEX 20 MIN: CPT

## 2017-07-20 PROCEDURE — 36415 COLL VENOUS BLD VENIPUNCTURE: CPT

## 2017-07-20 PROCEDURE — 97116 GAIT TRAINING THERAPY: CPT

## 2017-07-20 PROCEDURE — 99900035 HC TECH TIME PER 15 MIN (STAT)

## 2017-07-20 PROCEDURE — 25000003 PHARM REV CODE 250: Performed by: SURGERY

## 2017-07-20 PROCEDURE — 27000221 HC OXYGEN, UP TO 24 HOURS

## 2017-07-20 PROCEDURE — 94799 UNLISTED PULMONARY SVC/PX: CPT

## 2017-07-20 PROCEDURE — 83735 ASSAY OF MAGNESIUM: CPT

## 2017-07-20 PROCEDURE — 94770 HC EXHALED C02 TEST: CPT

## 2017-07-20 PROCEDURE — 63600175 PHARM REV CODE 636 W HCPCS: Performed by: SURGERY

## 2017-07-20 PROCEDURE — 80048 BASIC METABOLIC PNL TOTAL CA: CPT

## 2017-07-20 PROCEDURE — 94761 N-INVAS EAR/PLS OXIMETRY MLT: CPT

## 2017-07-20 PROCEDURE — 11000001 HC ACUTE MED/SURG PRIVATE ROOM

## 2017-07-20 PROCEDURE — 84100 ASSAY OF PHOSPHORUS: CPT

## 2017-07-20 PROCEDURE — 99232 SBSQ HOSP IP/OBS MODERATE 35: CPT | Mod: ,,, | Performed by: INTERNAL MEDICINE

## 2017-07-20 PROCEDURE — 85025 COMPLETE CBC W/AUTO DIFF WBC: CPT

## 2017-07-20 RX ORDER — URSODIOL 500 MG/1
500 TABLET, FILM COATED ORAL DAILY
Qty: 30 TABLET | Refills: 3 | Status: SHIPPED | OUTPATIENT
Start: 2017-07-20 | End: 2019-03-12

## 2017-07-20 RX ORDER — HYDROMORPHONE HYDROCHLORIDE 1 MG/ML
1 INJECTION, SOLUTION INTRAMUSCULAR; INTRAVENOUS; SUBCUTANEOUS EVERY 6 HOURS PRN
Status: DISCONTINUED | OUTPATIENT
Start: 2017-07-20 | End: 2017-07-21 | Stop reason: HOSPADM

## 2017-07-20 RX ORDER — SODIUM CHLORIDE 450 MG/100ML
INJECTION, SOLUTION INTRAVENOUS CONTINUOUS
Status: DISCONTINUED | OUTPATIENT
Start: 2017-07-20 | End: 2017-07-21 | Stop reason: HOSPADM

## 2017-07-20 RX ORDER — ONDANSETRON 4 MG/1
4 TABLET, ORALLY DISINTEGRATING ORAL EVERY 6 HOURS PRN
Qty: 30 TABLET | Refills: 0 | Status: SHIPPED | OUTPATIENT
Start: 2017-07-20 | End: 2018-11-06

## 2017-07-20 RX ORDER — HYDROCODONE BITARTRATE AND ACETAMINOPHEN 7.5; 325 MG/15ML; MG/15ML
30 SOLUTION ORAL EVERY 4 HOURS PRN
Qty: 473 ML | Refills: 0 | Status: SHIPPED | OUTPATIENT
Start: 2017-07-20 | End: 2017-07-22

## 2017-07-20 RX ORDER — DIAZEPAM 2 MG/1
2 TABLET ORAL EVERY 6 HOURS PRN
Qty: 20 TABLET | Refills: 0 | Status: SHIPPED | OUTPATIENT
Start: 2017-07-20 | End: 2018-11-06

## 2017-07-20 RX ORDER — OMEPRAZOLE 20 MG/1
20 CAPSULE, DELAYED RELEASE ORAL DAILY
Qty: 30 CAPSULE | Refills: 3 | Status: SHIPPED | OUTPATIENT
Start: 2017-07-20 | End: 2017-10-20

## 2017-07-20 RX ADMIN — HYDROCODONE BITARTRATE AND ACETAMINOPHEN 15 ML: 7.5; 325 SOLUTION ORAL at 08:07

## 2017-07-20 RX ADMIN — CEFAZOLIN SODIUM 2 G: 2 SOLUTION INTRAVENOUS at 01:07

## 2017-07-20 RX ADMIN — FAMOTIDINE 20 MG: 10 INJECTION, SOLUTION INTRAVENOUS at 10:07

## 2017-07-20 RX ADMIN — ONDANSETRON 8 MG: 2 INJECTION INTRAMUSCULAR; INTRAVENOUS at 07:07

## 2017-07-20 RX ADMIN — SODIUM CHLORIDE: 0.45 INJECTION, SOLUTION INTRAVENOUS at 10:07

## 2017-07-20 RX ADMIN — FAMOTIDINE 20 MG: 10 INJECTION, SOLUTION INTRAVENOUS at 08:07

## 2017-07-20 RX ADMIN — ENOXAPARIN SODIUM 40 MG: 100 INJECTION SUBCUTANEOUS at 03:07

## 2017-07-20 RX ADMIN — Medication: at 12:07

## 2017-07-20 RX ADMIN — CEFAZOLIN SODIUM 2 G: 2 SOLUTION INTRAVENOUS at 12:07

## 2017-07-20 RX ADMIN — HYDROCODONE BITARTRATE AND ACETAMINOPHEN 15 ML: 7.5; 325 SOLUTION ORAL at 03:07

## 2017-07-20 RX ADMIN — ENOXAPARIN SODIUM 40 MG: 100 INJECTION SUBCUTANEOUS at 04:07

## 2017-07-20 NOTE — NURSING
Pt ambulated in peng around nursing station X1 without difficulty, pt due to void, attempted to void without success, explained to pt the importance of voiding within the next few hours, will continue to monitor closely

## 2017-07-20 NOTE — PROGRESS NOTES
Progress Note  Hospital Medicine  Patient Name:Wendy Millan  MRN:  [id number]  Patient Class: IP- Inpatient  Admit Date: 7/19/2017  Length of Stay: 1 days  Expected Discharge Date:   Attending Physician: Dottie Mendoza MD  Primary Care Provider:  Rosa Thomason NP    SUBJECTIVE:     Principal Problem: S/P laparoscopic sleeve gastrectomy  Initial history of present illness: Patient is a 26 y.o. female admitted to Hospitalist Service from Operation Room s/p gastric sleeve surgery performed by Dr. Hook. Patient reportedly has past medical history significant for morbid obesity, GERD and history of nephrolithiasis. Post-operatively, she denied chest pain, shortness of breath, headache, vision changes, focal neuro-deficits, cough or fever.    PMH/PSH/SH/FH/Meds: reviewed.    Symptoms/Review of Systems: Doing well. Nausea stable. No shortness of breath, cough, chest pain or headache, fever or abdominal pain.     Diet:  Liquid bariatric diet  Activity level: Normal.    Pain:  Patient reports no pain.       OBJECTIVE:   Vital Signs (Most Recent):      Temp: 98.3 °F (36.8 °C) (07/20/17 0915)  Pulse: 86 (07/20/17 0915)  Resp: 18 (07/20/17 0915)  BP: 109/74 (07/20/17 0915)  SpO2: 98 % (07/20/17 0915)       Vital Signs Range (Last 24H):  Temp:  [97.5 °F (36.4 °C)-98.3 °F (36.8 °C)]   Pulse:  [64-97]   Resp:  [14-35]   BP: (107-148)/(58-92)   SpO2:  [90 %-100 %]     Weight: 92.4 kg (203 lb 12.8 oz)  Body mass index is 37.28 kg/m².    Intake/Output Summary (Last 24 hours) at 07/20/17 0959  Last data filed at 07/20/17 0500   Gross per 24 hour   Intake          3218.27 ml   Output              700 ml   Net          2518.27 ml     Physical Examination:  General appearance: well developed, appears stated age  Head: normocephalic, atraumatic  Eyes:  conjunctivae/corneas clear. PERRL.  Nose: Nares normal. Septum midline.  Throat: lips, mucosa, and tongue normal; teeth and gums normal, no throat erythema.  Neck: supple,  symmetrical, trachea midline, no JVD and thyroid not enlarged, symmetric, no tenderness/mass/nodules  Lungs:  clear to auscultation bilaterally and normal respiratory effort  Chest wall: no tenderness  Heart: regular rate and rhythm, S1, S2 normal, no murmur, click, rub or gallop  Abdomen: soft, non-tender non-distented; bowel sounds normal; no masses,  no organomegaly. Abdominal dressing C/D/I.  Extremities: no cyanosis, clubbing or edema.   Pulses: 2+ and symmetric  Skin: Skin color, texture, turgor normal. No rashes or lesions.  Lymph nodes: Cervical, supraclavicular, and axillary nodes normal.  Neurologic: Normal strength and tone. No focal numbness or weakness. CNII-XII intact.      CBC:    Recent Labs  Lab 07/20/17 0454   WBC 11.30   RBC 4.82   HGB 13.0   HCT 38.9      MCV 81*   MCH 27.0   MCHC 33.5   BMP    Recent Labs  Lab 07/20/17 0454         K 4.0      CO2 25   BUN 5*   CREATININE 0.7   CALCIUM 9.5   MG 2.0      Diagnostic Results:  Microbiology Results (last 7 days)     ** No results found for the last 168 hours. **         Assessment/Plan:     Active Hospital Problems    Diagnosis  POA    *S/P laparoscopic sleeve gastrectomy [Z98.84]  Not Applicable    GERD (gastroesophageal reflux disease) [K21.9]  Yes    History of nephrolithiasis [N20.0]  Yes    Morbid obesity due to excess calories [E66.01]  Yes      Resolved Hospital Problems    Diagnosis Date Resolved POA   No resolved problems to display.     Tele-monitoring.  Continue to follow Dr. Hook's recommendations.  Needs aggressive incentive spirometry.  Follow hemoglobin and hematocrit closely.  Pain control with PO narcotics and antiemetics as needed.  Observe fall precautions.  Check blood glucose level q AC/HS.  Use Novolog Insulin Sliding Scale as needed.   Continue routine medications as before.   Gastric ulcer prophylaxis with gastric acid suppressant.  Deep venous thrombosis prophylaxis with Lovenox 40 mg SQ  daily.  See Orders.  VTE Risk Mitigation         Ordered     enoxaparin injection 40 mg  Every 12 hours (non-standard times)     Route:  Subcutaneous        07/19/17 1422     Place MARICEL hose  Until discontinued      07/19/17 1422     Place sequential compression device  Until discontinued      07/19/17 1422     Medium Risk of VTE  Once      07/19/17 1422        Dottie Mendoza MD  Department of Hospital Medicine   [facility]

## 2017-07-20 NOTE — PLAN OF CARE
07/20/17 0852   Patient Assessment/Suction   Level of Consciousness (AVPU) alert   Respiratory Effort Normal;Unlabored   Expansion/Accessory Muscles/Retractions no retractions;no use of accessory muscles;expansion symmetric   All Lung Fields Breath Sounds clear;equal bilaterally   Rhythm/Pattern, Respiratory depth regular;pattern regular;unlabored   Cough Frequency infrequent   Cough Type good;nonproductive   PRE-TX-O2-ETCO2   O2 Device (Oxygen Therapy) nasal cannula   $ Is the patient on Oxygen? Yes   Flow (L/min) 2   Oxygen Concentration (%) 28   SpO2 96 %   Pulse Oximetry Type Intermittent   $ Pulse Oximetry - Multiple Charge Pulse Oximetry - Multiple   ETCO2 (mmHg) 42 mmHg   $ ETCO2 Charge Exhaled CO2 Monitoring   $ ETCO2 Usage Currently wearing   Pulse 64   Resp 20   Incentive Spirometer   $ Incentive Spirometer Charges done with encouragement   Predicted Level (mL) (Incentive Spirometer) 2500   Administration (Incentive Spirometer) done with encouragement   Number of Repetitions (Incentive Spirometer) 10   Level (mL) (Incentive Spirometer) 2750   Patient Tolerance good       Patient encouraged to do deep breathing exercises independently.

## 2017-07-20 NOTE — PLAN OF CARE
The pt is independent in ADL's. She lives with her parents and denies HH or DME. Christianoe sees Rosa Thomason NP and has [employer] insurance. She uses [facility] [address] in [place]. She has no questions or concerns at this time and I wrote my name and number on the pts white board. Meghan RICH [place], [facility]     07/20/17 1320   Discharge Assessment   Assessment Type Discharge Planning Assessment   Confirmed/corrected address and phone number on facesheet? Yes   Assessment information obtained from? Patient   Communicated expected length of stay with patient/caregiver no   If Healthcare Directive is received, is it scanned into Epic? no (comment)   Prior to hospitilization cognitive status: Alert/Oriented   Prior to hospitalization functional status: Independent   Current cognitive status: Alert/Oriented   Current Functional Status: Independent   Arrived From admitted as an inpatient   Lives With parent(s)   Able to Return to Prior Arrangements yes   Is patient able to care for self after discharge? Yes   Readmission Within The Last 30 Days no previous admission in last 30 days   Patient currently being followed by outpatient case management? No   Patient currently receives home health services? No   Does the patient currently use HME? No   Patient currently receives private duty nursing? No   Patient currently receives any other outside agency services? No   Equipment Currently Used at Home none   Do you have any problems affording any of your prescribed medications? No  ([facility] )   Is the patient taking medications as prescribed? yes   Do you have any financial concerns preventing you from receiving the healthcare you need? No  ([employer] )   Does the patient have transportation to healthcare appointments? Yes   Does the patient receive services at the [facility]? Yes   Are there any open cases? No   Discharge Plan A Home   Discharge Plan B Home with family   Patient/Family In Agreement With Plan yes

## 2017-07-20 NOTE — PLAN OF CARE
Problem: Patient Care Overview  Goal: Plan of Care Review  PT eval and D/c. Pt gait trained feet with CGA and IV pole to push.

## 2017-07-20 NOTE — PT/OT/SLP EVAL
7/19/17/Physical Therapy  Evaluation    Wendy Millan   MRN: [id number]   Admitting Diagnosis: S/P laparoscopic sleeve gastrectomy    PT Received On: 07/20/17  PT Start Time: 1022     PT Stop Time: 1038    PT Total Time (min): 16 min       Billable Minutes:  Evaluation 8 and Gait Training8    Diagnosis: S/P laparoscopic sleeve gastrectomy      Past Medical History:   Diagnosis Date    GERD (gastroesophageal reflux disease)     Kidney stones     Spinal headache       Past Surgical History:   Procedure Laterality Date    EXTRACORPOREAL SHOCK WAVE LITHOTRIPSY      with stent placement    WISDOM TOOTH EXTRACTION         Referring physician:   Date referred to PT: 7/19/17    General Precautions: Standard,    Orthopedic Precautions: N/A   Braces: N/A            Patient History:  Lives With: parent(s)  Living Arrangements: house  Equipment Currently Used at Home: none   DME owned (not currently used): none     Previous Level of Function:  Ambulation Skills: independent  Transfer Skills: independent  ADL Skills: independent  Work/Leisure Activity: independent    Subjective:  Communicated with nurse (Luna) prior to session.    Chief Complaint: pain  Patient goals: to go home            Objective:   Patient found with: peripheral IV, telemetry, PCA     Cognitive Exam:  Oriented to: Person, Place, Time and Situation    Follows Commands/attention: Follows two-step commands  Communication: clear/fluent  Safety awareness/insight to disability: intact    Physical Exam:  Postural examination/scapula alignment: Rounded shoulder and Head forward    Skin integrity: Visible skin intact  Edema: None noted     Sensation:   Intact    Upper Extremity Range of Motion:  Right Upper Extremity: WFL  Left Upper Extremity: WFL    Upper Extremity Strength:  Right Upper Extremity: WFL  Left Upper Extremity: WFL    Lower Extremity Range of Motion:  Right Lower Extremity: WFL  Left Lower Extremity: WFL    Lower Extremity  Strength:  Right Lower Extremity: >4-/5  Left Lower Extremity: >4-/5    Functional Mobility:  Bed Mobility:  Supine to Sit: Minimum Assistance    Transfers:  Sit <> Stand Assistance: Minimum Assistance  Sit <> Stand Assistive Device: No Assistive Device  Bed <> Chair Technique: Stand Pivot  Bed <> Chair Assistance: Minimum Assistance  Bed <> Chair Assistive Device: No Assistive Device    Gait:   Gait Distance: 480 feet with gait belt   Assistance 1: Minimum assistance  Gait Assistive Device:  (IV pole to push)  Gait Pattern: 3-point gait      Balance:   Static Sit: NORMAL: No deviations seen in posture held statically  Dynamic Sit: NORMAL: No deviations seen in posture held dynamically  Static Stand: FAIR: Maintains without assist but unable to take challenges  Dynamic stand: FAIR: Needs CONTACT GUARD during gait    Therapeutic Activities and Exercises:  Pt gait trained 480 feet with IV pole to push and min A with gait belt in place.     AM-PAC 6 CLICK MOBILITY  How much help from another person does this patient currently need?   1 = Unable, Total/Dependent Assistance  2 = A lot, Maximum/Moderate Assistance  3 = A little, Minimum/Contact Guard/Supervision  4 = None, Modified [place]/Independent          AM-PAC Raw Score CMS G-Code Modifier Level of Impairment Assistance   6 % Total / Unable   7 - 9 CM 80 - 100% Maximal Assist   10 - 14 CL 60 - 80% Moderate Assist   15 - 19 CK 40 - 60% Moderate Assist   20 - 22 CJ 20 - 40% Minimal Assist   23 CI 1-20% SBA / CGA   24 CH 0% Independent/ Mod I     Patient left up in chair with all lines intact, call button in reach and mother present.    Assessment:   Wendy Millan is a 26 y.o. female with a medical diagnosis of S/P laparoscopic sleeve gastrectomy and presents with pain and decreased functional mobility.     Rehab identified problem list/impairments: Rehab identified problem list/impairments: weakness, pain    Rehab potential is good.    Activity  tolerance: Good    Discharge recommendations: Discharge Facility/Level Of Care Needs: home     Barriers to discharge: Barriers to Discharge: None    Equipment recommendations: Equipment Needed After Discharge: none     GOALS:    Physical Therapy Goals     Not on file                    Keyona Rose, PT  07/20/2017

## 2017-07-20 NOTE — PLAN OF CARE
07/19/17 2055   Patient Assessment/Suction   Level of Consciousness (AVPU) alert   Respiratory Effort Normal   PRE-TX-O2-ETCO2   O2 Device (Oxygen Therapy) nasal cannula   $ Is the patient on Oxygen? Yes   Flow (L/min) 2   SpO2 98 %   Pulse Oximetry Type Intermittent   $ Pulse Oximetry - Multiple Charge Pulse Oximetry - Multiple   ETCO2 (mmHg) 39 mmHg   $ ETCO2 Charge Exhaled CO2 Monitoring   $ ETCO2 Usage Currently wearing

## 2017-07-20 NOTE — PROGRESS NOTES
Progress Note  Gen Surg    Admit Date: 7/19/2017  Attending: Monster  S/P: Procedure(s) (LRB):  GASTRECTOMY-SLEEVE-LAPAROSCOPIC (N/A)    Post-operative Day: 1 Day Post-Op    Hospital Day: 2    SUBJECTIVE:     Doing well, some nausea no vomiting pain controlled     OBJECTIVE:     Vital Signs (Most Recent)  Temp: 97.8 °F (36.6 °C) (07/20/17 0447)  Pulse: 69 (07/20/17 0447)  Resp: 18 (07/20/17 0447)  BP: 111/70 (07/20/17 0447)  SpO2: 96 % (07/20/17 0447)    Vital Signs Range (Last 24H):  Temp:  [97.5 °F (36.4 °C)-98.2 °F (36.8 °C)]   Pulse:  [66-97]   Resp:  [14-35]   BP: (107-148)/(58-92)   SpO2:  [90 %-100 %]     I & O (Last 24H):  Intake/Output Summary (Last 24 hours) at 07/20/17 0811  Last data filed at 07/20/17 0500   Gross per 24 hour   Intake          3218.27 ml   Output              700 ml   Net          2518.27 ml       Scheduled medications:   ceFAZolin (ANCEF) IVPB  2 g Intravenous Q6H    enoxaparin  40 mg Subcutaneous Q12H    famotidine (PF)  20 mg Intravenous Q12H       Physical Exam:  General: no distress  Lungs:  clear to auscultation bilaterally and normal respiratory effort  Heart: regular rate and rhythm, S1, S2 normal, no murmur, rub or gallop  Abdomen: soft, non-tender non-distented; bowel sounds normal; no masses,  no organomegaly    Wound/Incision:  clean, dry, intact    Laboratory:  Labs within the past 24 hours have been reviewed.    ASSESSMENT/PLAN:     Doing well post op- advance diet to sugar free clears, ambulate around nursing station, use incentive spirometer, transition to oral medications, D/C PCA this afternoon, Ok to shower tonight      Tanner Hook MD

## 2017-07-21 ENCOUNTER — TELEPHONE (OUTPATIENT)
Dept: BARIATRICS | Facility: CLINIC | Age: 27
End: 2017-07-21

## 2017-07-21 VITALS
SYSTOLIC BLOOD PRESSURE: 117 MMHG | RESPIRATION RATE: 18 BRPM | HEART RATE: 90 BPM | OXYGEN SATURATION: 99 % | WEIGHT: 203.81 LBS | BODY MASS INDEX: 37.51 KG/M2 | TEMPERATURE: 98 F | DIASTOLIC BLOOD PRESSURE: 80 MMHG | HEIGHT: 62 IN

## 2017-07-21 PROCEDURE — 97802 MEDICAL NUTRITION INDIV IN: CPT

## 2017-07-21 PROCEDURE — 94799 UNLISTED PULMONARY SVC/PX: CPT

## 2017-07-21 PROCEDURE — 25000003 PHARM REV CODE 250: Performed by: SURGERY

## 2017-07-21 PROCEDURE — 99238 HOSP IP/OBS DSCHRG MGMT 30/<: CPT | Mod: ,,, | Performed by: INTERNAL MEDICINE

## 2017-07-21 PROCEDURE — 94761 N-INVAS EAR/PLS OXIMETRY MLT: CPT

## 2017-07-21 PROCEDURE — 63600175 PHARM REV CODE 636 W HCPCS: Performed by: SURGERY

## 2017-07-21 PROCEDURE — 36430 TRANSFUSION BLD/BLD COMPNT: CPT

## 2017-07-21 RX ORDER — HYDROCODONE BITARTRATE AND ACETAMINOPHEN 7.5; 325 MG/15ML; MG/15ML
30 SOLUTION ORAL EVERY 4 HOURS PRN
Qty: 473 ML | Refills: 0 | Status: SHIPPED | OUTPATIENT
Start: 2017-07-21 | End: 2017-07-22

## 2017-07-21 RX ADMIN — ENOXAPARIN SODIUM 40 MG: 100 INJECTION SUBCUTANEOUS at 04:07

## 2017-07-21 RX ADMIN — FAMOTIDINE 20 MG: 10 INJECTION, SOLUTION INTRAVENOUS at 07:07

## 2017-07-21 RX ADMIN — HYDROCODONE BITARTRATE AND ACETAMINOPHEN 15 ML: 7.5; 325 SOLUTION ORAL at 07:07

## 2017-07-21 RX ADMIN — HYDROCODONE BITARTRATE AND ACETAMINOPHEN 15 ML: 7.5; 325 SOLUTION ORAL at 01:07

## 2017-07-21 RX ADMIN — ONDANSETRON 8 MG: 2 INJECTION INTRAMUSCULAR; INTRAVENOUS at 12:07

## 2017-07-21 RX ADMIN — ONDANSETRON 8 MG: 2 INJECTION INTRAMUSCULAR; INTRAVENOUS at 01:07

## 2017-07-21 RX ADMIN — DIAZEPAM 2 MG: 5 INJECTION, SOLUTION INTRAMUSCULAR; INTRAVENOUS at 01:07

## 2017-07-21 RX ADMIN — HYDROMORPHONE HYDROCHLORIDE 1 MG: 1 INJECTION, SOLUTION INTRAMUSCULAR; INTRAVENOUS; SUBCUTANEOUS at 04:07

## 2017-07-21 RX ADMIN — HYDROCODONE BITARTRATE AND ACETAMINOPHEN 15 ML: 7.5; 325 SOLUTION ORAL at 12:07

## 2017-07-21 NOTE — PROGRESS NOTES
Progress Note  Gen Surg    Admit Date: 7/19/2017  Attending: Monster  S/P: Procedure(s) (LRB):  GASTRECTOMY-SLEEVE-LAPAROSCOPIC (N/A)    Post-operative Day: 2 Days Post-Op    Hospital Day: 3    SUBJECTIVE:   Doing great     OBJECTIVE:     Vital Signs (Most Recent)  Temp: 98.3 °F (36.8 °C) (07/21/17 0734)  Pulse: 90 (07/21/17 0734)  Resp: 18 (07/21/17 0734)  BP: 117/80 (07/21/17 0734)  SpO2: 99 % (07/21/17 0815)    Vital Signs Range (Last 24H):  Temp:  [97.6 °F (36.4 °C)-98.8 °F (37.1 °C)]   Pulse:  [68-90]   Resp:  [18]   BP: (108-117)/(70-80)   SpO2:  [95 %-100 %]     I & O (Last 24H):  Intake/Output Summary (Last 24 hours) at 07/21/17 1220  Last data filed at 07/21/17 0600   Gross per 24 hour   Intake             1800 ml   Output             1000 ml   Net              800 ml       Scheduled medications:   enoxaparin  40 mg Subcutaneous Q12H    famotidine (PF)  20 mg Intravenous Q12H       Physical Exam:  General: no distress  Lungs:  clear to auscultation bilaterally and normal respiratory effort  Heart: regular rate and rhythm, S1, S2 normal, no murmur, rub or gallop  Abdomen: soft, non-tender non-distented; bowel sounds normal; no masses,  no organomegaly    Wound/Incision:  clean, dry, intact    Laboratory:  Labs within the past 24 hours have been reviewed.    ASSESSMENT/PLAN:     Ok to discharge    Tanner Hook MD

## 2017-07-21 NOTE — TELEPHONE ENCOUNTER
----- Message from She Willoughby sent at 7/21/2017 11:23 AM CDT -----  Contact: Omid  Lino called regarding the patient's pain medication. Stating another physician submitted the same medication. Please contact Omid JANG - [place] [address]  Phone: [phone] Fax: [phone]

## 2017-07-21 NOTE — CONSULTS
Nutrition education provided for s/p gastric sleeve. Pt and her mother were engaged with instruction and report they have items ready for home use. Reviewed progression, vitamins and additional resources for information as outlined in packet. Handout and contact information provided.

## 2017-07-21 NOTE — PLAN OF CARE
07/20/17 2100   PRE-TX-O2-ETCO2   O2 Device (Oxygen Therapy) room air   Incentive Spirometer   $ Incentive Spirometer Charges done independently per patient   Predicted Level (mL) (Incentive Spirometer) 2500   Administration (Incentive Spirometer) done independently per patient   Number of Repetitions (Incentive Spirometer) 10   Level (mL) (Incentive Spirometer) 2750   Patient Tolerance good

## 2017-07-21 NOTE — PROGRESS NOTES
07/21/17 0815   Patient Assessment/Suction   Level of Consciousness (AVPU) alert   All Lung Fields Breath Sounds clear   PRE-TX-O2-ETCO2   O2 Device (Oxygen Therapy) room air   SpO2 99 %   Pulse Oximetry Type Intermittent   $ Pulse Oximetry - Multiple Charge Pulse Oximetry - Multiple   Incentive Spirometer   $ Incentive Spirometer Charges done with encouragement   Administration (Incentive Spirometer) done with encouragement   Number of Repetitions (Incentive Spirometer) 10   Level (mL) (Incentive Spirometer) 3000   Patient Tolerance good

## 2017-07-21 NOTE — DISCHARGE SUMMARY
Discharge Summary  Hospital Medicine    Admit Date: 7/19/2017    Date and Time: 7/21/201710:19 AM    Discharge Attending Physician: Dottie Mendoza MD    Primary Care Physician: Rosa Thomason NP    Diagnoses:  Active Hospital Problems    Diagnosis  POA    *S/P laparoscopic sleeve gastrectomy [Z98.84]  Not Applicable    GERD (gastroesophageal reflux disease) [K21.9]  Yes    History of nephrolithiasis [N20.0]  Yes    Morbid obesity due to excess calories [E66.01]  Yes      Resolved Hospital Problems    Diagnosis Date Resolved POA   No resolved problems to display.     Discharged Condition: Good    Hospital Course:   Patient is a 26 y.o. female admitted to Hospitalist Service from Operation Room s/p gastric sleeve surgery performed by Dr. Hook. Patient reportedly has past medical history significant for morbid obesity, GERD and history of nephrolithiasis. Post-operatively, she denied chest pain, shortness of breath, headache, vision changes, focal neuro-deficits, cough or fever. Patient was admitted to Hospitalist medicine service. Patient was followed by Dr. Hook. Patient was admitted to hospital medicine. Patient was closely monitored post-operatively. Blood sugar and blood pressure closely followed. Patient was given supportive care. Patient worked with Physical Therapist. Nausea and pain controlled. Patient tolerated liquid diet. Symptoms resolved. Patient was discharged home in stable condition with following discharge plan of care.     Consults: Dr. Hook    Significant Diagnostic Studies:     Microbiology Results (last 7 days)     ** No results found for the last 168 hours. **        Special Treatments/Procedures: as above  Disposition: Home or Self Care    Medications:  Reconciled Home Medications: Current Discharge Medication List      START taking these medications    Details   diazePAM (VALIUM) 2 MG tablet Take 1 tablet (2 mg total) by mouth every 6 (six) hours as needed for Anxiety (muscle  spasm).  Qty: 20 tablet, Refills: 0      !! hydrocodone-acetaminophen (HYCET) solution 7.5-325 mg/15mL Take 30 mLs by mouth every 4 (four) hours as needed for Pain.  Qty: 473 mL, Refills: 0      !! hydrocodone-acetaminophen (HYCET) solution 7.5-325 mg/15mL Take 30 mLs by mouth every 4 (four) hours as needed for Pain.  Qty: 473 mL, Refills: 0    Associated Diagnoses: Morbid obesity due to excess calories      !! hydrocodone-acetaminophen (HYCET) solution 7.5-325 mg/15mL Take 30 mLs by mouth every 4 (four) hours as needed for Pain.  Qty: 473 mL, Refills: 0      !! omeprazole (PRILOSEC) 20 MG capsule Take 1 capsule (20 mg total) by mouth once daily.  Qty: 30 capsule, Refills: 3      ondansetron (ZOFRAN-ODT) 4 MG TbDL Take 1 tablet (4 mg total) by mouth every 6 (six) hours as needed.  Qty: 30 tablet, Refills: 0      ursodiol (ACTIGALL) 500 MG tablet Take 1 tablet (500 mg total) by mouth once daily.  Qty: 30 tablet, Refills: 3       !! - Potential duplicate medications found. Please discuss with provider.      CONTINUE these medications which have NOT CHANGED    Details   MULTIVITAMIN/IRON/FOLIC ACID (CENTRUM ULTRA WOMEN'S ORAL) Take 1 tablet by mouth once daily at 6am.      !! omeprazole (PRILOSEC) 40 MG capsule Take 1 capsule (40 mg total) by mouth once daily.  Qty: 90 capsule, Refills: 2    Associated Diagnoses: Gastroesophageal reflux disease, esophagitis presence not specified      polyethylene glycol (GLYCOLAX) 17 gram PwPk Take by mouth.      diphenhydrAMINE (BENADRYL) 25 mg capsule Take 25 mg by mouth every 6 (six) hours as needed for Itching.      lisdexamfetamine (VYVANSE) 50 MG capsule Take 50 mg by mouth every morning.      tazarotene (TAZORAC) 0.05 % Crea cream Apply topically once daily.  Qty: 30 g, Refills: 0       !! - Potential duplicate medications found. Please discuss with provider.          Discharge Procedure Orders  Diet general   Order Comments: Liquid bariatric diet     Other restrictions  (specify):   Order Comments: Fall precautions     Call MD for:   Order Comments: For worsening symptoms, chest pain, shortness of breath, increased abdominal pain, high grade fever, stroke or stroke like symptoms, immediately go to the nearest Emergency Room or call 911 as soon as possible.       Follow-up Information     Tanner Hook MD. Call on 8/4/2017.    Specialties:  General Surgery, Bariatrics, Surgery  Why:  @2:00pm; If symptoms worsen, As needed, For wound re-check  Contact information:  [address] [facility] [address]  [facility] [id number] [phone]             Rosa Thomason NP In 1 week.    Specialty:  Family Medicine  Contact information:  [address]  [facility] [address] [phone]

## 2017-07-21 NOTE — TELEPHONE ENCOUNTER
Pain medication was called in by Dr Mendoza today and Dr Hook yesterday.  Advised the patient had surgery and possibly the hospitalist did not know that meds were already sent in.  He will put the most recent script on hold.

## 2017-07-22 ENCOUNTER — TELEPHONE (OUTPATIENT)
Dept: SURGERY | Facility: HOSPITAL | Age: 27
End: 2017-07-22

## 2017-07-22 RX ORDER — HYDROCODONE BITARTRATE AND ACETAMINOPHEN 5; 325 MG/1; MG/1
2 TABLET ORAL EVERY 4 HOURS PRN
Qty: 50 TABLET | Refills: 0 | Status: SHIPPED | OUTPATIENT
Start: 2017-07-22 | End: 2018-01-26 | Stop reason: ALTCHOICE

## 2017-07-22 NOTE — PLAN OF CARE
07/22/17 0946   Final Note   Assessment Type Final Discharge Note   Discharge Disposition Home   Discharge planning education complete? Yes

## 2017-07-24 ENCOUNTER — TELEPHONE (OUTPATIENT)
Dept: FAMILY MEDICINE | Facility: CLINIC | Age: 27
End: 2017-07-24

## 2017-07-24 ENCOUNTER — PATIENT OUTREACH (OUTPATIENT)
Dept: ADMINISTRATIVE | Facility: CLINIC | Age: 27
End: 2017-07-24

## 2017-07-24 RX ORDER — HYDROCODONE BITARTRATE AND ACETAMINOPHEN 5; 325 MG/1; MG/1
1 TABLET ORAL EVERY 6 HOURS PRN
COMMUNITY
End: 2017-07-27 | Stop reason: SDUPTHER

## 2017-07-24 NOTE — TELEPHONE ENCOUNTER
Spoke to pt and scheduled a fu visit for her surgery. Pt states she is doing well now. Advised pt to call our office if she has any questions or concerns or needs to be seen earlier than scheduled appt. TUNDE Valdez LPN

## 2017-07-24 NOTE — PATIENT INSTRUCTIONS
Discharge Instructions for Bariatric (Obesity) Surgery  You have had a procedure called bariatric surgery. During this procedure, a healthcare provider surgically changed your stomach (and maybe your small intestine as well) so it can only hold a small amount of food at one time, absorb a certain amount of food at one time, or both, depending on the type of operation you had. This should help you lose weight and decrease your risk of health problems. These include diabetes, respiratory problems, and coronary artery disease. This surgery will make it difficult for you to eat large amounts of solid foods. After the surgery, you must eat very small meals. Eating too much food or too fast may cause unpleasant symptoms, such as nausea, vomiting, or pain in your upper belly. You'll need to make lifelong behavior changes to keep off the weight you lost through bariatric surgery.   Changes in how you eat  Here are suggestions to change how you eat:   · Follow the diet that was prescribed for you in the hospital. You may need to have only liquids for 2 weeks after the surgery, or you may be able to eat pureed foods.   · Drink liquids in smaller amounts than you used to. This will make it easier for your body to digest liquids. But, it is important that you continue to drink liquids (in small amounts) throughout the day so that you do not become dehydrated. Some signs of dehydration include dry mouth and dark urine.  · Eat slowly. Plan on taking at least 20 to 30 minutes to finish a meal. Eating too much or too fast will cause nausea and vomiting.  · Avoid the unpleasant effects of dumping syndrome. This can happen after a bariatric operation. The syndrome refers to stomach pain, nausea, vomiting, diarrhea, sweating, dizziness, rapid heartbeat, and bloating that happen after eating foods high in sugar, such as ice cream and milkshakes. Symptoms can happen within 10 to 30 minutes after eating, or sometimes as long as 2 to 3  hours after a meal.In some instances, fainting can also happen. So avoid foods that could cause this.  · Take vitamin supplements as directed by your healthcare provider.    Activity  Here are suggestions for being active after surgery:   · Keep in mind that recovery takes several weeks. It is normal to feel tired. Rest as needed.  · Walk as often as you feel able. Increase your activity slowly.  · Do not lift anything heavier than 10 pounds. Avoid any stomach-straining exercises until your healthcare provider says it's OK.   · Avoid strenuous chores, such as vacuuming or lifting full bags of garbage, until your healthcare provider says its OK.  · Climb stairs slowly and pause after every few steps.  · You can benefit from simple activities, such as walking or gardening. Ask your healthcare provider how and when to get started.  · Ask your healthcare provider when you can expect to return to work.  Home care  Suggestions for taking care of yourself after surgery:   · Continue the coughing and deep breathing exercises you learned in the hospital.  · Shower as needed. But avoid baths, swimming pools, and hot tubs until your healthcare provider says it's OK. This helps prevent infection of the incision site.  · Keep the incision clean and dry. Wash the incision gently with mild soap and warm water. Then gently pat the incision dry with a towel.  · Follow your healthcare providers instructions about caring for the dressing covering your incisions.  · If your healthcare provider used small white adhesive strips to close the incision, do not remove them. Let the strips fall off on their own. If they dont come off within 2 weeks after you were sent home, call your healthcare provider.  · Take your medicines in crushed or liquid form for 3 weeks after surgery and as instructed.  · Keep in mind that your medicines will need to be adjusted as you lose weight. Ask your healthcare provider about what changes you should make  in your medicines.   · If you use a CPAP or BiPAP machine for sleep apnea, don't stop using it without talking to your healthcare provider.   · Learn to take your own pulse. Keep a record of your results. Ask your healthcare provider which readings mean that you need medical attention.  · Ask your healthcare provider when you can start driving again. Don't drive if you are taking pain medicine.     When to call your healthcare provider  Call your healthcare provider right away if you have any of the following:  · Cloudy or smelly drainage from the incision site  · Redness, pain, or increased swelling at the incision site  · Fever of 100.4°F (38°C) or higher, or shaking chills  · Fast pulse  · Night sweats  · Sudden difficulty breathing or chest pain   · Swelling or pain in your calves   · Persistent pain, nausea, or vomiting after eating  · Diarrhea beyond the first week after discharge  · Pain in your upper back, chest, or left shoulder  · Persistent hiccups  · Confusion, depression, or unusual fatigue  · Signs of bladder infection (urinating more often than usual; burning, pain, bleeding, or hesitancy when you urinate)   Date Last Reviewed: 3/23/2016  © 7575-6075 [employer]. [address] [place] [address], [place] [address]. All rights reserved. This information is not intended as a substitute for professional medical care. Always follow your healthcare professional's instructions.

## 2017-07-24 NOTE — TELEPHONE ENCOUNTER
Please forward this important TCC information to your provider in order to maximize the post discharge care delivery of this patient.     C3 nurse spoke with Wendy Millan for a TCC post hospital discharge follow up call. The patient does not have a scheduled HOSFU appointment with Rosa Thomason NP  within 7-14 days post hospital discharge date 07/20/17. C3 nurse was unable to schedule HOSFU appointment in [facility].   Please contact patient and schedule follow up appointment using HOSFU visit type on or before 08/03/17.

## 2017-07-27 ENCOUNTER — OFFICE VISIT (OUTPATIENT)
Dept: FAMILY MEDICINE | Facility: CLINIC | Age: 27
End: 2017-07-27
Payer: COMMERCIAL

## 2017-07-27 VITALS
HEIGHT: 62 IN | HEART RATE: 92 BPM | TEMPERATURE: 98 F | SYSTOLIC BLOOD PRESSURE: 106 MMHG | BODY MASS INDEX: 36.74 KG/M2 | WEIGHT: 199.63 LBS | DIASTOLIC BLOOD PRESSURE: 72 MMHG

## 2017-07-27 DIAGNOSIS — Z98.84 S/P LAPAROSCOPIC SLEEVE GASTRECTOMY: ICD-10-CM

## 2017-07-27 DIAGNOSIS — E66.9 OBESITY (BMI 30-39.9): ICD-10-CM

## 2017-07-27 DIAGNOSIS — R10.9 ABDOMINAL PAIN, UNSPECIFIED LOCATION: Primary | ICD-10-CM

## 2017-07-27 PROCEDURE — 99214 OFFICE O/P EST MOD 30 MIN: CPT | Mod: S$GLB,,, | Performed by: NURSE PRACTITIONER

## 2017-07-28 NOTE — PROGRESS NOTES
Subjective:       Patient ID: Wendy Millan is a 26 y.o. female.    Chief Complaint: Follow-up    The patient is here for a hospital follow-up visit.  The patient had a sleeve gastrectomy with Dr. Hook on 7/19/17.  She was discharged on 7/21/17.  She tells me she was having problems with pain control at first it was not tolerating the liquid pain medication.  She is now using Norco 5/325 and this is controlling her pain well.  She denies any vomiting.  She is having slight nausea with increased fluid intake.  She is pushing herself to drink as much as she can.  She is starting soups today.  She denies any fever or chills.      Review of Systems   Constitutional: Negative for activity change and unexpected weight change.   HENT: Negative for hearing loss, rhinorrhea and trouble swallowing.    Eyes: Negative for discharge and visual disturbance.   Respiratory: Negative for chest tightness and wheezing.    Cardiovascular: Negative for chest pain and palpitations.   Gastrointestinal: Negative for blood in stool, constipation, diarrhea and vomiting.   Endocrine: Negative for polydipsia and polyuria.   Genitourinary: Negative for difficulty urinating, dysuria, hematuria and menstrual problem.   Musculoskeletal: Negative for arthralgias, joint swelling and neck pain.   Neurological: Negative for weakness and headaches.   Psychiatric/Behavioral: Negative for confusion and dysphoric mood.       Objective:      Physical Exam   Constitutional: She is oriented to person, place, and time. She appears well-developed.   Obese female   HENT:   Head: Normocephalic and atraumatic.   Right Ear: External ear normal.   Left Ear: External ear normal.   Nose: Nose normal.   Mouth/Throat: Oropharynx is clear and moist.   Eyes: Conjunctivae are normal. Right eye exhibits no discharge. Left eye exhibits no discharge. No scleral icterus.   Neck: Normal range of motion. Neck supple. No tracheal deviation present.   Cardiovascular:  Normal rate, regular rhythm and normal heart sounds.  Exam reveals no friction rub.    No murmur heard.  Pulmonary/Chest: Effort normal and breath sounds normal. No stridor. No respiratory distress. She has no wheezes. She has no rales. She exhibits no tenderness.   Abdominal: Soft. Bowel sounds are normal. She exhibits no distension. There is no tenderness.   Multiple incision sites without erythema   Musculoskeletal: Normal range of motion.   Lymphadenopathy:     She has no cervical adenopathy.   Neurological: She is alert and oriented to person, place, and time.   Skin: Skin is warm and dry.   Psychiatric: She has a normal mood and affect.       Assessment:       1. Abdominal pain, unspecified location    2. Obesity (BMI 30-39.9)    3. S/P laparoscopic sleeve gastrectomy        Plan:       Wendy was seen today for follow-up.    Diagnoses and all orders for this visit:    Abdominal pain, unspecified location  Controlled with current regimen    Obesity (BMI 30-39.9)  Continue weight loss    S/P laparoscopic sleeve gastrectomy  Follow up with Dr Hook

## 2017-08-04 ENCOUNTER — OFFICE VISIT (OUTPATIENT)
Dept: BARIATRICS | Facility: CLINIC | Age: 27
End: 2017-08-04
Payer: COMMERCIAL

## 2017-08-04 VITALS
RESPIRATION RATE: 16 BRPM | TEMPERATURE: 98 F | HEIGHT: 62 IN | BODY MASS INDEX: 35.59 KG/M2 | HEART RATE: 79 BPM | WEIGHT: 193.38 LBS | DIASTOLIC BLOOD PRESSURE: 76 MMHG | SYSTOLIC BLOOD PRESSURE: 116 MMHG

## 2017-08-04 DIAGNOSIS — E66.01 MORBID OBESITY WITH BMI OF 40.0-44.9, ADULT: ICD-10-CM

## 2017-08-04 DIAGNOSIS — E66.01 MORBID OBESITY DUE TO EXCESS CALORIES: Primary | ICD-10-CM

## 2017-08-04 DIAGNOSIS — Z98.84 S/P LAPAROSCOPIC SLEEVE GASTRECTOMY: ICD-10-CM

## 2017-08-04 PROCEDURE — 99999 PR PBB SHADOW E&M-EST. PATIENT-LVL III: CPT | Mod: PBBFAC,,, | Performed by: SURGERY

## 2017-08-04 PROCEDURE — 99024 POSTOP FOLLOW-UP VISIT: CPT | Mod: S$GLB,,, | Performed by: SURGERY

## 2017-08-04 NOTE — PROGRESS NOTES
Post Op Note    Surgery: gastric sleeve surgery  Date: 7/19/17  Initial weight: 219  Last weight: 218  Current weight: 193    Constipation: diarrhea right now, feels burning  Reflux: taking prilosec, took a few double doses which helped and is controlled currently  Vomiting: nausea resolved, no vomiting    Diet:    Premier protein shake- 1-2 per day  Soups- creamy- 1/4 can of soup throughout the day    Exercise:  Walking daily 7 times per week 15- 20 minutes    MVI: 1 per day    Vitals:    08/04/17 1357   BP: 116/76   Pulse: 79   Resp: 16   Temp: 98.3 °F (36.8 °C)       Body comp:  Fat Percent:  47.2 %  Fat Mass:  91.2 lb  FFM:  102.2 lb  TBW: 74.2 lb  TBW %:  38.4 %  BMR: 1511 kcal    PE:  NAD  RRR  Soft/nt/nd  Incisions: well healed    Labs: none    A/P: s/p sleeve    Loose stool- try imodium first then switch protein shakes if it doesn't work      Counseling for patient:    Diet: continue protocol  Exercise: ok for cardio, no heavy lifting over 30 pounds for another month  Vitamins: 2 mvi daily, calcium (tums), and b complex  Co morbidities:     1. Morbid obesity due to excess calories     2. Morbid obesity with BMI of 40.0-44.9, adult     3. S/P laparoscopic sleeve gastrectomy         : I met with the patient for 15 minutes and counseled her for over 50% of that time

## 2017-09-22 PROBLEM — N13.2 URETERAL STONE WITH HYDRONEPHROSIS: Status: ACTIVE | Noted: 2017-09-22

## 2017-09-25 ENCOUNTER — PATIENT MESSAGE (OUTPATIENT)
Dept: BARIATRICS | Facility: CLINIC | Age: 27
End: 2017-09-25

## 2017-10-17 ENCOUNTER — PATIENT MESSAGE (OUTPATIENT)
Dept: BARIATRICS | Facility: CLINIC | Age: 27
End: 2017-10-17

## 2017-10-20 ENCOUNTER — OFFICE VISIT (OUTPATIENT)
Dept: BARIATRICS | Facility: CLINIC | Age: 27
End: 2017-10-20
Payer: COMMERCIAL

## 2017-10-20 DIAGNOSIS — N20.0 KIDNEY STONE: ICD-10-CM

## 2017-10-20 DIAGNOSIS — E66.01 MORBID OBESITY: Primary | ICD-10-CM

## 2017-10-20 DIAGNOSIS — Z98.84 S/P LAPAROSCOPIC SLEEVE GASTRECTOMY: ICD-10-CM

## 2017-10-20 PROCEDURE — 99999 PR PBB SHADOW E&M-EST. PATIENT-LVL III: CPT | Mod: PBBFAC,,, | Performed by: SURGERY

## 2017-10-20 PROCEDURE — 99213 OFFICE O/P EST LOW 20 MIN: CPT | Mod: S$GLB,,, | Performed by: SURGERY

## 2017-10-20 RX ORDER — OMEPRAZOLE 40 MG/1
40 CAPSULE, DELAYED RELEASE ORAL DAILY
Refills: 2 | COMMUNITY
Start: 2017-09-14 | End: 2018-08-07 | Stop reason: SDUPTHER

## 2017-10-20 NOTE — PROGRESS NOTES
Post Op Note    Surgery: gastric sleeve surgery  Date: 7/19/17  Initial weight: 219  Last weight: 193  Current weight: 173    Constipation: none  Reflux: none  Vomiting: none; gets nauseated with vitamin and occasionally vomits    Diet:  Breakfast:  Protein shake  Lunch: chicken breast with side of veggies  Dinner: chicken breast with veggies or hamburger paola  Snack: cheese sticks; almonds occasionally  Protein shake: premier    Exercise:  Walking around neighborhood- 1 miles 4-5 times per week    MVI: 2 per day, b12    Vitals:    10/20/17 1502   BP: 114/73   Pulse: 83   Resp: 16   Temp: 98.7 °F (37.1 °C)       Body comp:  Fat Percent:  42.3 %  Fat Mass:  73.4 lb  FFM:  100.2 lb  TBW: 72 lb  TBW %:  41.5 %  BMR: 1457 kcal    PE:  NAD  RRR  Soft/nt/nd  Incisions: well healed    Labs: pending    A/P: s/p sleeve     Counseling for patient:    Diet: Keep portion sizes at 3 ounces or less.  Continue to eat low carb  Exercise: Increase exercise intensity.  We talked about a goal of 5 times per week 20 min of cardio and 20 min of weights  Vitamins:  Suspected calcium oxalate stone, decrease to 1 MVI daily, check labs include pth    Co morbidities:     1. Morbid obesity  CBC w/ Auto Differential    CMP    B12    B1    Lipid Panel    Iron Studies   2. Kidney stone  PTH, intact   3. S/P laparoscopic sleeve gastrectomy         : I met with the patient for 15 minutes and counseled her for over 50% of that time

## 2017-10-23 ENCOUNTER — LAB VISIT (OUTPATIENT)
Dept: LAB | Facility: HOSPITAL | Age: 27
End: 2017-10-23
Attending: SURGERY
Payer: COMMERCIAL

## 2017-10-23 DIAGNOSIS — E66.01 MORBID OBESITY: ICD-10-CM

## 2017-10-23 DIAGNOSIS — N20.0 KIDNEY STONE: ICD-10-CM

## 2017-10-23 LAB
ALBUMIN SERPL BCP-MCNC: 3.7 G/DL
ALP SERPL-CCNC: 63 U/L
ALT SERPL W/O P-5'-P-CCNC: 61 U/L
ANION GAP SERPL CALC-SCNC: 7 MMOL/L
AST SERPL-CCNC: 35 U/L
BASOPHILS # BLD AUTO: 0.03 K/UL
BASOPHILS NFR BLD: 0.4 %
BILIRUB SERPL-MCNC: 0.4 MG/DL
BUN SERPL-MCNC: 9 MG/DL
CALCIUM SERPL-MCNC: 9.6 MG/DL
CHLORIDE SERPL-SCNC: 107 MMOL/L
CHOLEST SERPL-MCNC: 159 MG/DL
CHOLEST/HDLC SERPL: 2.6 {RATIO}
CO2 SERPL-SCNC: 29 MMOL/L
CREAT SERPL-MCNC: 0.8 MG/DL
DIFFERENTIAL METHOD: NORMAL
EOSINOPHIL # BLD AUTO: 0.1 K/UL
EOSINOPHIL NFR BLD: 1.7 %
ERYTHROCYTE [DISTWIDTH] IN BLOOD BY AUTOMATED COUNT: 14 %
EST. GFR  (AFRICAN AMERICAN): >60 ML/MIN/1.73 M^2
EST. GFR  (NON AFRICAN AMERICAN): >60 ML/MIN/1.73 M^2
GLUCOSE SERPL-MCNC: 92 MG/DL
HCT VFR BLD AUTO: 41.1 %
HDLC SERPL-MCNC: 61 MG/DL
HDLC SERPL: 38.4 %
HGB BLD-MCNC: 13.4 G/DL
IMM GRANULOCYTES # BLD AUTO: 0.03 K/UL
IMM GRANULOCYTES NFR BLD AUTO: 0.4 %
IRON SERPL-MCNC: 71 UG/DL
LDLC SERPL CALC-MCNC: 88.4 MG/DL
LYMPHOCYTES # BLD AUTO: 2.4 K/UL
LYMPHOCYTES NFR BLD: 30.3 %
MCH RBC QN AUTO: 27.6 PG
MCHC RBC AUTO-ENTMCNC: 32.6 G/DL
MCV RBC AUTO: 85 FL
MONOCYTES # BLD AUTO: 0.5 K/UL
MONOCYTES NFR BLD: 6.8 %
NEUTROPHILS # BLD AUTO: 4.7 K/UL
NEUTROPHILS NFR BLD: 60.4 %
NONHDLC SERPL-MCNC: 98 MG/DL
NRBC BLD-RTO: 0 /100 WBC
PLATELET # BLD AUTO: 281 K/UL
PMV BLD AUTO: 11.6 FL
POTASSIUM SERPL-SCNC: 3.9 MMOL/L
PROT SERPL-MCNC: 7 G/DL
PTH-INTACT SERPL-MCNC: 44 PG/ML
RBC # BLD AUTO: 4.85 M/UL
SATURATED IRON: 16 %
SODIUM SERPL-SCNC: 143 MMOL/L
TOTAL IRON BINDING CAPACITY: 431 UG/DL
TRANSFERRIN SERPL-MCNC: 291 MG/DL
TRIGL SERPL-MCNC: 48 MG/DL
VIT B12 SERPL-MCNC: 488 PG/ML
WBC # BLD AUTO: 7.75 K/UL

## 2017-10-23 PROCEDURE — 36415 COLL VENOUS BLD VENIPUNCTURE: CPT | Mod: PO

## 2017-10-23 PROCEDURE — 80061 LIPID PANEL: CPT

## 2017-10-23 PROCEDURE — 82607 VITAMIN B-12: CPT

## 2017-10-23 PROCEDURE — 85025 COMPLETE CBC W/AUTO DIFF WBC: CPT

## 2017-10-23 PROCEDURE — 80053 COMPREHEN METABOLIC PANEL: CPT

## 2017-10-23 PROCEDURE — 83540 ASSAY OF IRON: CPT

## 2017-10-23 PROCEDURE — 83970 ASSAY OF PARATHORMONE: CPT

## 2017-10-23 PROCEDURE — 84425 ASSAY OF VITAMIN B-1: CPT

## 2017-10-26 LAB — VIT B1 SERPL-MCNC: 59 UG/L (ref 38–122)

## 2018-01-22 VITALS
WEIGHT: 173.63 LBS | DIASTOLIC BLOOD PRESSURE: 73 MMHG | HEART RATE: 83 BPM | BODY MASS INDEX: 31.95 KG/M2 | TEMPERATURE: 99 F | HEIGHT: 62 IN | RESPIRATION RATE: 16 BRPM | SYSTOLIC BLOOD PRESSURE: 114 MMHG

## 2018-01-26 ENCOUNTER — OFFICE VISIT (OUTPATIENT)
Dept: SURGERY | Facility: CLINIC | Age: 28
End: 2018-01-26
Payer: COMMERCIAL

## 2018-01-26 VITALS
BODY MASS INDEX: 30.66 KG/M2 | HEART RATE: 83 BPM | TEMPERATURE: 99 F | WEIGHT: 166.63 LBS | DIASTOLIC BLOOD PRESSURE: 75 MMHG | RESPIRATION RATE: 16 BRPM | SYSTOLIC BLOOD PRESSURE: 106 MMHG | HEIGHT: 62 IN

## 2018-01-26 DIAGNOSIS — E66.01 MORBID OBESITY: Primary | ICD-10-CM

## 2018-01-26 DIAGNOSIS — Z98.84 S/P LAPAROSCOPIC SLEEVE GASTRECTOMY: ICD-10-CM

## 2018-01-26 PROCEDURE — 99999 PR PBB SHADOW E&M-EST. PATIENT-LVL III: CPT | Mod: PBBFAC,,, | Performed by: SURGERY

## 2018-01-26 PROCEDURE — 99213 OFFICE O/P EST LOW 20 MIN: CPT | Mod: S$GLB,,, | Performed by: SURGERY

## 2018-01-26 NOTE — PROGRESS NOTES
Post Op Note    Surgery: gastric sleeve surgery  Date: 7/19/17  Initial weight: 219  Last weight: 173  Current weight: 166    Constipation: none  Reflux: none  Vomiting: nausea resolved    Diet:    Portion restricted    Breakfast:  Eggs and diaz  Lunch: eggs, vegetables  Dinner: chicken vegetables; steak; pork chops; carrots, peas  Snack: almonds, carrot sticks  Protein shake: premier    Exercise:  1 hour of aerobic with step and weights: 3 days per week    MVI: 1 per day, b12    Vitals:    01/26/18 1519   BP: 106/75   Pulse: 83   Resp: 16   Temp: 98.9 °F (37.2 °C)       Body comp:  Fat Percent:  42.3 %  Fat Mass:  70.4 lb  FFM:  96.2 lb  TBW: 68.8 lb   TBW %:  41.3 %  BMR: 1401 kcal    PE:  NAD  RRR  Soft/nt/nd  Incisions: well healed    Labs: pending    A/P: s/p sleeve     Counseling for patient:    Diet: doing well with diet.  We talked about keeping portions small.  Exercise: made goal 20 minutes of cardio, 20 minutes of weights 5 times per week by the 9 month aziza  Vitamins: continue regimen  Co morbidities:     1. Morbid obesity  CBC w/ Auto Differential    CMP    B12    B1    Lipid Panel    Iron Studies    Vitamin D 25 Hydroxy   2. S/P laparoscopic sleeve gastrectomy         : I met with the patient for 15 minutes and counseled her for over 50% of that time

## 2018-03-19 DIAGNOSIS — K21.9 GASTROESOPHAGEAL REFLUX DISEASE, ESOPHAGITIS PRESENCE NOT SPECIFIED: ICD-10-CM

## 2018-03-19 RX ORDER — OMEPRAZOLE 40 MG/1
40 CAPSULE, DELAYED RELEASE ORAL DAILY
Qty: 90 CAPSULE | Refills: 2 | Status: SHIPPED | OUTPATIENT
Start: 2018-03-19 | End: 2018-08-07

## 2018-04-21 ENCOUNTER — LAB VISIT (OUTPATIENT)
Dept: LAB | Facility: HOSPITAL | Age: 28
End: 2018-04-21
Attending: SURGERY
Payer: COMMERCIAL

## 2018-04-21 DIAGNOSIS — E66.01 MORBID OBESITY: ICD-10-CM

## 2018-04-21 LAB
25(OH)D3+25(OH)D2 SERPL-MCNC: 15 NG/ML
ALBUMIN SERPL BCP-MCNC: 4 G/DL
ALP SERPL-CCNC: 62 U/L
ALT SERPL W/O P-5'-P-CCNC: 15 U/L
ANION GAP SERPL CALC-SCNC: 7 MMOL/L
AST SERPL-CCNC: 18 U/L
BASOPHILS # BLD AUTO: 0.03 K/UL
BASOPHILS NFR BLD: 0.5 %
BILIRUB SERPL-MCNC: 0.4 MG/DL
BUN SERPL-MCNC: 7 MG/DL
CALCIUM SERPL-MCNC: 9.6 MG/DL
CHLORIDE SERPL-SCNC: 106 MMOL/L
CHOLEST SERPL-MCNC: 163 MG/DL
CHOLEST/HDLC SERPL: 2.5 {RATIO}
CO2 SERPL-SCNC: 26 MMOL/L
CREAT SERPL-MCNC: 0.8 MG/DL
DIFFERENTIAL METHOD: NORMAL
EOSINOPHIL # BLD AUTO: 0.1 K/UL
EOSINOPHIL NFR BLD: 1.2 %
ERYTHROCYTE [DISTWIDTH] IN BLOOD BY AUTOMATED COUNT: 13 %
EST. GFR  (AFRICAN AMERICAN): >60 ML/MIN/1.73 M^2
EST. GFR  (NON AFRICAN AMERICAN): >60 ML/MIN/1.73 M^2
GLUCOSE SERPL-MCNC: 85 MG/DL
HCT VFR BLD AUTO: 38.5 %
HDLC SERPL-MCNC: 65 MG/DL
HDLC SERPL: 39.9 %
HGB BLD-MCNC: 12.8 G/DL
IMM GRANULOCYTES # BLD AUTO: 0.01 K/UL
IMM GRANULOCYTES NFR BLD AUTO: 0.2 %
IRON SERPL-MCNC: 76 UG/DL
LDLC SERPL CALC-MCNC: 88.6 MG/DL
LYMPHOCYTES # BLD AUTO: 1.5 K/UL
LYMPHOCYTES NFR BLD: 22.4 %
MCH RBC QN AUTO: 27.4 PG
MCHC RBC AUTO-ENTMCNC: 33.2 G/DL
MCV RBC AUTO: 82 FL
MONOCYTES # BLD AUTO: 0.5 K/UL
MONOCYTES NFR BLD: 7.1 %
NEUTROPHILS # BLD AUTO: 4.4 K/UL
NEUTROPHILS NFR BLD: 68.6 %
NONHDLC SERPL-MCNC: 98 MG/DL
NRBC BLD-RTO: 0 /100 WBC
PLATELET # BLD AUTO: 300 K/UL
PMV BLD AUTO: 11 FL
POTASSIUM SERPL-SCNC: 4.2 MMOL/L
PROT SERPL-MCNC: 7.4 G/DL
RBC # BLD AUTO: 4.67 M/UL
SATURATED IRON: 15 %
SODIUM SERPL-SCNC: 139 MMOL/L
TOTAL IRON BINDING CAPACITY: 509 UG/DL
TRANSFERRIN SERPL-MCNC: 344 MG/DL
TRIGL SERPL-MCNC: 47 MG/DL
VIT B12 SERPL-MCNC: 243 PG/ML
WBC # BLD AUTO: 6.46 K/UL

## 2018-04-21 PROCEDURE — 80061 LIPID PANEL: CPT

## 2018-04-21 PROCEDURE — 80053 COMPREHEN METABOLIC PANEL: CPT

## 2018-04-21 PROCEDURE — 82306 VITAMIN D 25 HYDROXY: CPT

## 2018-04-21 PROCEDURE — 83540 ASSAY OF IRON: CPT

## 2018-04-21 PROCEDURE — 85025 COMPLETE CBC W/AUTO DIFF WBC: CPT

## 2018-04-21 PROCEDURE — 36415 COLL VENOUS BLD VENIPUNCTURE: CPT | Mod: PO

## 2018-04-21 PROCEDURE — 82607 VITAMIN B-12: CPT

## 2018-04-21 PROCEDURE — 84425 ASSAY OF VITAMIN B-1: CPT

## 2018-04-25 LAB — VIT B1 SERPL-MCNC: 42 UG/L (ref 38–122)

## 2018-08-07 ENCOUNTER — OFFICE VISIT (OUTPATIENT)
Dept: FAMILY MEDICINE | Facility: CLINIC | Age: 28
End: 2018-08-07
Payer: COMMERCIAL

## 2018-08-07 VITALS
HEART RATE: 80 BPM | SYSTOLIC BLOOD PRESSURE: 110 MMHG | WEIGHT: 172.94 LBS | TEMPERATURE: 100 F | DIASTOLIC BLOOD PRESSURE: 70 MMHG | BODY MASS INDEX: 31.82 KG/M2 | RESPIRATION RATE: 18 BRPM | HEIGHT: 62 IN

## 2018-08-07 DIAGNOSIS — Z23 IMMUNIZATION DUE: ICD-10-CM

## 2018-08-07 DIAGNOSIS — H66.90 OTITIS MEDIA, UNSPECIFIED LATERALITY, UNSPECIFIED OTITIS MEDIA TYPE: Primary | ICD-10-CM

## 2018-08-07 PROCEDURE — 99214 OFFICE O/P EST MOD 30 MIN: CPT | Mod: 25,S$GLB,, | Performed by: NURSE PRACTITIONER

## 2018-08-07 PROCEDURE — 90471 IMMUNIZATION ADMIN: CPT | Mod: S$GLB,,, | Performed by: NURSE PRACTITIONER

## 2018-08-07 PROCEDURE — 3008F BODY MASS INDEX DOCD: CPT | Mod: CPTII,S$GLB,, | Performed by: NURSE PRACTITIONER

## 2018-08-07 PROCEDURE — 90651 9VHPV VACCINE 2/3 DOSE IM: CPT | Mod: S$GLB,,, | Performed by: NURSE PRACTITIONER

## 2018-08-07 RX ORDER — AMOXICILLIN 875 MG/1
875 TABLET, FILM COATED ORAL EVERY 12 HOURS
Qty: 14 TABLET | Refills: 0 | Status: SHIPPED | OUTPATIENT
Start: 2018-08-07 | End: 2018-08-14

## 2018-08-07 RX ORDER — OMEPRAZOLE 40 MG/1
40 CAPSULE, DELAYED RELEASE ORAL DAILY
Qty: 90 CAPSULE | Refills: 3 | Status: SHIPPED | OUTPATIENT
Start: 2018-08-07 | End: 2019-07-04 | Stop reason: SDUPTHER

## 2018-08-08 NOTE — PROGRESS NOTES
"Subjective:       Patient ID: Wendy Millan is a 27 y.o. female.    Chief Complaint: Pressure in head/Left ear (Symptoms about one month); Been getting alot of tonsil stones; and Green sinus drainage    URI    This is a new problem. The current episode started 1 to 4 weeks ago. The problem has been gradually worsening. Associated symptoms include congestion, coughing, ear pain, headaches, a plugged ear sensation, rhinorrhea, sinus pain, sneezing, a sore throat and swollen glands. Pertinent negatives include no abdominal pain, chest pain, diarrhea, dysuria, nausea or vomiting. She has tried antihistamine, decongestant and NSAIDs for the symptoms. The treatment provided no relief.     Review of Systems   Constitutional: Positive for appetite change and fatigue.   HENT: Positive for congestion, ear pain, postnasal drip, rhinorrhea, sinus pain, sinus pressure, sneezing and sore throat.    Eyes: Negative for pain and redness.   Respiratory: Positive for cough. Negative for choking, chest tightness and shortness of breath.    Cardiovascular: Negative for chest pain and palpitations.   Gastrointestinal: Negative for abdominal distention, abdominal pain, constipation, diarrhea, nausea and vomiting.   Endocrine: Negative for polydipsia and polyphagia.   Genitourinary: Negative for dysuria and hematuria.   Musculoskeletal: Negative for arthralgias, joint swelling and myalgias.   Skin: Negative for color change and pallor.   Neurological: Positive for headaches. Negative for dizziness and light-headedness.       Past medical, surgical, family and social history reviewed.  Objective:     Vitals:    08/07/18 1557   BP: 110/70   Pulse: 80   Resp: 18   Temp: 99.5 °F (37.5 °C)   TempSrc: Oral   Weight: 78.4 kg (172 lb 15.2 oz)   Height: 5' 2" (1.575 m)   PainSc: 0-No pain     Body mass index is 31.63 kg/m².     Physical Exam   Constitutional: She is oriented to person, place, and time. She appears well-developed.   Obese " female    HENT:   Head: Normocephalic and atraumatic.   Right Ear: Hearing, tympanic membrane, external ear and ear canal normal.   Left Ear: Hearing, external ear and ear canal normal. Tympanic membrane is erythematous and bulging. Tympanic membrane mobility is abnormal.   Nose: Mucosal edema and rhinorrhea present. No nose lacerations or sinus tenderness. Right sinus exhibits maxillary sinus tenderness and frontal sinus tenderness. Left sinus exhibits maxillary sinus tenderness and frontal sinus tenderness.   Mouth/Throat: Uvula is midline and mucous membranes are normal. Posterior oropharyngeal edema and posterior oropharyngeal erythema present.   Eyes: Conjunctivae are normal. Right eye exhibits no discharge. Left eye exhibits no discharge. No scleral icterus.   Neck: Normal range of motion. Neck supple. No tracheal deviation present.   Cardiovascular: Normal rate and regular rhythm.    No murmur heard.  Pulmonary/Chest: Effort normal and breath sounds normal. No stridor. No respiratory distress. She has no wheezes. She has no rales. She exhibits no tenderness.   Musculoskeletal: Normal range of motion.   Lymphadenopathy:     She has cervical adenopathy.   Neurological: She is alert and oriented to person, place, and time.   Skin: Skin is warm and dry.   Psychiatric: She has a normal mood and affect. Her behavior is normal. Judgment and thought content normal.       Assessment:       1. Otitis media, unspecified laterality, unspecified otitis media type    2. Immunization due        Plan:       Wendy was seen today for pressure in head/left ear, been getting alot of tonsil stones and green sinus drainage.    Diagnoses and all orders for this visit:    Otitis media, unspecified laterality, unspecified otitis media type    Immunization due  -     HPV Vaccine (9-Valent) (3 Dose) (IM)    Other orders  -     amoxicillin (AMOXIL) 875 MG tablet; Take 1 tablet (875 mg total) by mouth every 12 (twelve) hours. for 7  days  -     omeprazole (PRILOSEC) 40 MG capsule; Take 1 capsule (40 mg total) by mouth once daily.

## 2018-08-14 ENCOUNTER — PATIENT MESSAGE (OUTPATIENT)
Dept: FAMILY MEDICINE | Facility: CLINIC | Age: 28
End: 2018-08-14

## 2018-08-14 RX ORDER — CEFDINIR 300 MG/1
300 CAPSULE ORAL 2 TIMES DAILY
Qty: 20 CAPSULE | Refills: 0 | Status: SHIPPED | OUTPATIENT
Start: 2018-08-14 | End: 2018-08-24

## 2018-08-16 ENCOUNTER — PATIENT MESSAGE (OUTPATIENT)
Dept: FAMILY MEDICINE | Facility: CLINIC | Age: 28
End: 2018-08-16

## 2018-08-19 RX ORDER — FLUCONAZOLE 150 MG/1
150 TABLET ORAL DAILY
Qty: 1 TABLET | Refills: 0 | OUTPATIENT
Start: 2018-08-19 | End: 2018-08-20

## 2018-10-31 ENCOUNTER — PATIENT MESSAGE (OUTPATIENT)
Dept: OBSTETRICS AND GYNECOLOGY | Facility: CLINIC | Age: 28
End: 2018-10-31

## 2018-11-06 ENCOUNTER — OFFICE VISIT (OUTPATIENT)
Dept: OBSTETRICS AND GYNECOLOGY | Facility: CLINIC | Age: 28
End: 2018-11-06
Payer: COMMERCIAL

## 2018-11-06 VITALS
WEIGHT: 171.75 LBS | BODY MASS INDEX: 31.41 KG/M2 | DIASTOLIC BLOOD PRESSURE: 82 MMHG | SYSTOLIC BLOOD PRESSURE: 108 MMHG

## 2018-11-06 DIAGNOSIS — R10.9 LEFT SIDED ABDOMINAL PAIN: ICD-10-CM

## 2018-11-06 DIAGNOSIS — N92.1 METRORRHAGIA: Primary | ICD-10-CM

## 2018-11-06 PROCEDURE — 3008F BODY MASS INDEX DOCD: CPT | Mod: CPTII,S$GLB,, | Performed by: OBSTETRICS & GYNECOLOGY

## 2018-11-06 PROCEDURE — 99999 PR PBB SHADOW E&M-EST. PATIENT-LVL III: CPT | Mod: PBBFAC,,, | Performed by: OBSTETRICS & GYNECOLOGY

## 2018-11-06 PROCEDURE — 99213 OFFICE O/P EST LOW 20 MIN: CPT | Mod: S$GLB,,, | Performed by: OBSTETRICS & GYNECOLOGY

## 2018-11-06 RX ORDER — NORETHINDRONE ACETATE AND ETHINYL ESTRADIOL .02; 1 MG/1; MG/1
1 TABLET ORAL DAILY
Qty: 30 TABLET | Refills: 11 | Status: SHIPPED | OUTPATIENT
Start: 2018-11-06 | End: 2019-09-08 | Stop reason: SDUPTHER

## 2018-11-06 NOTE — PROGRESS NOTES
Chief Complaint   Patient presents with    Metrorrhagia     3 cycles within a month    Dysmenorrhea       History of Present Illness: Wendy Millan is a 28 y.o. female that presents today 11/6/2018 for   Chief Complaint   Patient presents with    Metrorrhagia     3 cycles within a month    Dysmenorrhea     She reports heavy irregular periods. She reports always heavy but three cycles in the last month. She reports bleeding 5 days then 9 days of no bleeding and then 5 days of bleeding then 7 days of no bleeding then 5 days of bleeding again. Q14 day periods for 3 episodes. She  Has not had sex. She reports no steroids or stress changes.     Past Medical History:   Diagnosis Date    GERD (gastroesophageal reflux disease)     Kidney stones     Spinal headache        Past Surgical History:   Procedure Laterality Date    CYSTOSCOPY, RETROGRADE, URETEROSCOPY, STENT PLACEMENT Left 9/22/2017    Per[facility]    EXTRACORPOREAL SHOCK WAVE LITHOTRIPSY      with stent placement    GASTRECTOMY-SLEEVE-LAPAROSCOPIC N/A 7/19/2017    [facility]    REMOVAL-STONE-URETERAL Left 9/22/2017    [facility] at [facility] OR    STOMACH SURGERY  07/2017    sleeve, 60 lbs weight loss    WISDOM TOOTH EXTRACTION         Current Outpatient Medications   Medication Sig Dispense Refill    MULTIVITAMIN/IRON/FOLIC ACID (CENTRUM ULTRA WOMEN'S ORAL) Take 1 tablet by mouth once daily at 6am.      omeprazole (PRILOSEC) 40 MG capsule Take 1 capsule (40 mg total) by mouth once daily. 90 capsule 3    norethindrone-ethinyl estradiol (MICROGESTIN 1/20) 1-20 mg-mcg per tablet Take 1 tablet by mouth once daily. 30 tablet 11    ursodiol (ACTIGALL) 500 MG tablet Take 1 tablet (500 mg total) by mouth once daily. 30 tablet 3     No current facility-administered medications for this visit.        Review of patient's allergies indicates:  No Known  Allergies    Family History   Problem Relation Age of Onset    Mitral valve prolapse Mother     Migraines Mother     Allergic rhinitis Mother     Asthma Mother     Colon polyps Mother     Hypertension Father     Hypertension Maternal Grandfather     Diabetes Paternal Grandmother     Angioedema Neg Hx     Eczema Neg Hx     Immunodeficiency Neg Hx     Urticaria Neg Hx     Breast cancer Neg Hx     Ovarian cancer Neg Hx        Social History     Tobacco Use    Smoking status: Never Smoker    Smokeless tobacco: Never Used   Substance Use Topics    Alcohol use: Yes     Comment: social    Drug use: No       OB History    Para Term  AB Living   0 0 0 0 0 0   SAB TAB Ectopic Multiple Live Births   0 0 0 0               Review of Symptoms:  GENERAL: Denies weight gain or weight loss. Feeling well overall.   SKIN: Denies rash or lesions.   HEAD: Denies head injury or headache.   NODES: Denies enlarged lymph nodes.   CHEST: Denies chest pain or shortness of breath.   CARDIOVASCULAR: Denies palpitations or left sided chest pain.   ABDOMEN: No abdominal pain, constipation, diarrhea, nausea, vomiting or rectal bleeding.   URINARY: No frequency, dysuria, hematuria, or burning on urination.  HEMATOLOGIC: No easy bruisability or excessive bleeding.   MUSCULOSKELETAL: Denies joint pain or swelling.     /82   Wt 77.9 kg (171 lb 11.8 oz)   LMP 2018   Physical Exam:  APPEARANCE: Well nourished, well developed, in no acute distress.  SKIN: Normal skin turgor, no lesions.  NECK: Neck symmetric without masses   RESPIRATORY: Normal respiratory effort with no retractions or use of accessory muscles  CARDIOVASCULAR: Peripheral vascular system with no swelling no varicosities and palpation of pulses normal  LYMPHATIC: No enlargements of the lymph nodes noted in the neck, axillae, or groin  ABDOMEN: Soft. No tenderness or masses. No hepatosplenomegaly. No hernias.    ASSESSMENT/PLAN:  Metrorrhagia  -      norethindrone-ethinyl estradiol (MICROGESTIN 1/20) 1-20 mg-mcg per tablet; Take 1 tablet by mouth once daily.  Dispense: 30 tablet; Refill: 11  -     US Pelvis Limited Non OB; Future; Expected date: 11/06/2018    Left sided abdominal pain  -     norethindrone-ethinyl estradiol (MICROGESTIN 1/20) 1-20 mg-mcg per tablet; Take 1 tablet by mouth once daily.  Dispense: 30 tablet; Refill: 11  -     US Pelvis Limited Non OB; Future; Expected date: 11/06/2018        15 minutes spent today with this patient. Greater than half spent in counseling today.

## 2018-11-13 ENCOUNTER — HOSPITAL ENCOUNTER (OUTPATIENT)
Dept: RADIOLOGY | Facility: HOSPITAL | Age: 28
Discharge: HOME | End: 2018-11-13
Attending: OBSTETRICS & GYNECOLOGY
Payer: COMMERCIAL

## 2018-11-13 DIAGNOSIS — N92.1 METRORRHAGIA: ICD-10-CM

## 2018-11-13 DIAGNOSIS — R10.9 LEFT SIDED ABDOMINAL PAIN: ICD-10-CM

## 2018-11-13 PROCEDURE — 76856 US EXAM PELVIC COMPLETE: CPT | Mod: 26,,, | Performed by: RADIOLOGY

## 2018-11-13 PROCEDURE — 76830 TRANSVAGINAL US NON-OB: CPT | Mod: 26,,, | Performed by: RADIOLOGY

## 2018-11-13 PROCEDURE — 76830 TRANSVAGINAL US NON-OB: CPT | Mod: TC,PN

## 2018-11-13 PROCEDURE — 76856 US EXAM PELVIC COMPLETE: CPT | Mod: TC,PN

## 2018-12-07 ENCOUNTER — TELEPHONE (OUTPATIENT)
Dept: FAMILY MEDICINE | Facility: CLINIC | Age: 28
End: 2018-12-07

## 2018-12-07 ENCOUNTER — CLINICAL SUPPORT (OUTPATIENT)
Dept: FAMILY MEDICINE | Facility: CLINIC | Age: 28
End: 2018-12-07
Payer: COMMERCIAL

## 2018-12-07 DIAGNOSIS — Z23 IMMUNIZATION DUE: Primary | ICD-10-CM

## 2018-12-07 PROCEDURE — 90651 9VHPV VACCINE 2/3 DOSE IM: CPT | Mod: S$GLB,,, | Performed by: NURSE PRACTITIONER

## 2018-12-07 PROCEDURE — 90471 IMMUNIZATION ADMIN: CPT | Mod: S$GLB,,, | Performed by: NURSE PRACTITIONER

## 2018-12-07 NOTE — PROGRESS NOTES
Pt presents to clinic to receive 3rd and final HPV-9 shot. Administered to R Deltoid. Pt tolerated well.

## 2018-12-20 ENCOUNTER — OFFICE VISIT (OUTPATIENT)
Dept: OBSTETRICS AND GYNECOLOGY | Facility: CLINIC | Age: 28
End: 2018-12-20
Payer: COMMERCIAL

## 2018-12-20 VITALS
BODY MASS INDEX: 31.94 KG/M2 | DIASTOLIC BLOOD PRESSURE: 72 MMHG | SYSTOLIC BLOOD PRESSURE: 116 MMHG | WEIGHT: 174.63 LBS

## 2018-12-20 DIAGNOSIS — Z01.419 ENCOUNTER FOR GYNECOLOGICAL EXAMINATION WITHOUT ABNORMAL FINDING: Primary | ICD-10-CM

## 2018-12-20 PROCEDURE — 99999 PR PBB SHADOW E&M-EST. PATIENT-LVL III: CPT | Mod: PBBFAC,,, | Performed by: OBSTETRICS & GYNECOLOGY

## 2018-12-20 PROCEDURE — 99395 PREV VISIT EST AGE 18-39: CPT | Mod: S$GLB,,, | Performed by: OBSTETRICS & GYNECOLOGY

## 2018-12-20 PROCEDURE — 88175 CYTOPATH C/V AUTO FLUID REDO: CPT

## 2018-12-20 NOTE — PROGRESS NOTES
Chief Complaint   Patient presents with    Well Woman    Results     go over Us results       History of Present Illness: Wendy Millan is a 28 y.o. female that presents today 12/20/2018 for well gyn visit.    Past Medical History:   Diagnosis Date    GERD (gastroesophageal reflux disease)     Kidney stones     Spinal headache        Past Surgical History:   Procedure Laterality Date    CYSTOSCOPY, RETROGRADE, URETEROSCOPY, STENT PLACEMENT Left 9/22/2017    Performed by Kurt[facility] D[facility]za [facility] at [facility] OR    EXTRACORPOREAL SHOCK WAVE LITHOTRIPSY      with stent placement    GASTRECTOMY-SLEEVE-LAPAROSCOPIC N/A 7/19/2017    Performed by Tanner Hook [facility] at [facility] OR    REMOVAL-STONE-URETERAL Left 9/22/2017    Performed [facility] Kurt[facility] at [facility] OR    STOMACH SURGERY  07/2017    sleeve, 60 lbs weight loss    WISDOM TOOTH EXTRACTION         Current Outpatient Medications   Medication Sig Dispense Refill    norethindrone-ethinyl estradiol (MICROGESTIN 1/20) 1-20 mg-mcg per tablet Take 1 tablet by mouth once daily. 30 tablet 11    MULTIVITAMIN/IRON/FOLIC ACID (CENTRUM ULTRA WOMEN'S ORAL) Take 1 tablet by mouth once daily at 6am.      omeprazole (PRILOSEC) 40 MG capsule Take 1 capsule (40 mg total) by mouth once daily. 90 capsule 3    ursodiol (ACTIGALL) 500 MG tablet Take 1 tablet (500 mg total) by mouth once daily. 30 tablet 3     No current facility-administered medications for this visit.        Review of patient's allergies indicates:  No Known Allergies    Family History   Problem Relation Age of Onset    Mitral valve prolapse Mother     Migraines Mother     Allergic rhinitis Mother     Asthma Mother     Colon polyps Mother     Hypertension Father     Hypertension Maternal Grandfather     Diabetes Paternal Grandmother     Angioedema Neg Hx     Eczema Neg Hx     Immunodeficiency Neg Hx     Urticaria Neg Hx     Breast cancer Neg Hx     Ovarian cancer Neg Hx        Social  History     Socioeconomic History    Marital status: Single     Spouse name: None    Number of children: None    Years of education: None    Highest education level: None   Social Needs    Financial resource strain: None    Food insecurity - worry: None    Food insecurity - inability: None    Transportation needs - medical: None    Transportation needs - non-medical: None   Occupational History    None   Tobacco Use    Smoking status: Never Smoker    Smokeless tobacco: Never Used   Substance and Sexual Activity    Alcohol use: Yes     Comment: social    Drug use: No    Sexual activity: No   Other Topics Concern    None   Social History Narrative    None       OB History    Para Term  AB Living   0 0 0 0 0 0   SAB TAB Ectopic Multiple Live Births   0 0 0 0               Review of Symptoms:  GENERAL: Denies weight gain or weight loss. Feeling well overall.   SKIN: Denies rash or lesions.   HEAD: Denies head injury or headache.   NODES: Denies enlarged lymph nodes.   CHEST: Denies chest pain or shortness of breath.   CARDIOVASCULAR: Denies palpitations or left sided chest pain.   ABDOMEN: No abdominal pain, constipation, diarrhea, nausea, vomiting or rectal bleeding.   URINARY: No frequency, dysuria, hematuria, or burning on urination.  HEMATOLOGIC: No easy bruisability or excessive bleeding.   MUSCULOSKELETAL: Denies joint pain or swelling.     /72   Wt 79.2 kg (174 lb 9.7 oz)   LMP 2018 (Exact Date)   Physical Exam:  APPEARANCE: Well nourished, well developed, in no acute distress.  SKIN: Normal skin turgor, no lesions.  NECK: Neck symmetric without masses   RESPIRATORY: Normal respiratory effort with no retractions or use of accessory muscles  CARDIOVASCULAR: Peripheral vascular system with no swelling no varicosities and palpation of pulses normal  LYMPHATIC: No enlargements of the lymph nodes noted in the neck, axillae, or groin  ABDOMEN: Soft. No tenderness or masses.  No hepatosplenomegaly. No hernias.  BREASTS: Symmetrical, no skin changes or visible lesions. No palpable masses, nipple discharge or adenopathy bilaterally.  PELVIC: Normal external female genitalia without lesions. Normal hair distribution. Adequate perineal body, normal urethral meatus. Urethra with no masses.  Bladder nontender. Vagina moist and well rugated without lesions or discharge. Cervix pink and without lesions. No significant cystocele or rectocele. Bimanual exam showed uterus normal size, shape, position, mobile and nontender. Adnexa without masses or tenderness. Urethra and bladder normal.   EXTREMITIES: No clubbing cyanosis or edema.    ASSESSMENT/PLAN:  Encounter for gynecological examination without abnormal finding  -     Liquid-based pap smear, screening          Patient was counseled today on Pap guidelines, recommendation for pelvic exams, mammograms every other year after the age of 40 and annually after the age of 50, Colonoscopy after the age of 50, Dexa Bone Scan and calcium and vitamin D supplementation in menopause and to see her PCP for other health maintenance.   FOLLOW-UP:prn

## 2019-01-28 ENCOUNTER — PATIENT MESSAGE (OUTPATIENT)
Dept: BARIATRICS | Facility: CLINIC | Age: 29
End: 2019-01-28

## 2019-03-12 ENCOUNTER — TELEPHONE (OUTPATIENT)
Dept: FAMILY MEDICINE | Facility: CLINIC | Age: 29
End: 2019-03-12

## 2019-03-12 ENCOUNTER — PATIENT MESSAGE (OUTPATIENT)
Dept: FAMILY MEDICINE | Facility: CLINIC | Age: 29
End: 2019-03-12

## 2019-03-12 ENCOUNTER — OFFICE VISIT (OUTPATIENT)
Dept: FAMILY MEDICINE | Facility: CLINIC | Age: 29
End: 2019-03-12
Payer: COMMERCIAL

## 2019-03-12 VITALS
WEIGHT: 172.94 LBS | DIASTOLIC BLOOD PRESSURE: 60 MMHG | HEART RATE: 102 BPM | TEMPERATURE: 99 F | SYSTOLIC BLOOD PRESSURE: 110 MMHG | BODY MASS INDEX: 31.82 KG/M2 | HEIGHT: 62 IN | OXYGEN SATURATION: 98 % | RESPIRATION RATE: 18 BRPM

## 2019-03-12 DIAGNOSIS — H66.92 LEFT OTITIS MEDIA, UNSPECIFIED OTITIS MEDIA TYPE: ICD-10-CM

## 2019-03-12 DIAGNOSIS — J02.9 SORE THROAT: ICD-10-CM

## 2019-03-12 DIAGNOSIS — J02.0 STREP PHARYNGITIS: Primary | ICD-10-CM

## 2019-03-12 PROBLEM — E66.01 MORBID OBESITY DUE TO EXCESS CALORIES: Status: RESOLVED | Noted: 2017-04-25 | Resolved: 2019-03-12

## 2019-03-12 PROBLEM — E66.01 MORBID OBESITY WITH BMI OF 40.0-44.9, ADULT: Status: RESOLVED | Noted: 2017-04-25 | Resolved: 2019-03-12

## 2019-03-12 LAB
CTP QC/QA: YES
S PYO RRNA THROAT QL PROBE: NEGATIVE

## 2019-03-12 PROCEDURE — 87081 CULTURE SCREEN ONLY: CPT

## 2019-03-12 PROCEDURE — 87880 STREP A ASSAY W/OPTIC: CPT | Mod: QW,,, | Performed by: NURSE PRACTITIONER

## 2019-03-12 PROCEDURE — 3008F BODY MASS INDEX DOCD: CPT | Mod: CPTII,S$GLB,, | Performed by: NURSE PRACTITIONER

## 2019-03-12 PROCEDURE — 99214 PR OFFICE/OUTPT VISIT, EST, LEVL IV, 30-39 MIN: ICD-10-PCS | Mod: 25,S$GLB,, | Performed by: NURSE PRACTITIONER

## 2019-03-12 PROCEDURE — 96372 PR INJECTION,THERAP/PROPH/DIAG2ST, IM OR SUBCUT: ICD-10-PCS | Mod: S$GLB,,, | Performed by: NURSE PRACTITIONER

## 2019-03-12 PROCEDURE — 96372 THER/PROPH/DIAG INJ SC/IM: CPT | Mod: S$GLB,,, | Performed by: NURSE PRACTITIONER

## 2019-03-12 PROCEDURE — 99214 OFFICE O/P EST MOD 30 MIN: CPT | Mod: 25,S$GLB,, | Performed by: NURSE PRACTITIONER

## 2019-03-12 PROCEDURE — 3008F PR BODY MASS INDEX (BMI) DOCUMENTED: ICD-10-PCS | Mod: CPTII,S$GLB,, | Performed by: NURSE PRACTITIONER

## 2019-03-12 PROCEDURE — 87880 POCT RAPID STREP A: ICD-10-PCS | Mod: QW,,, | Performed by: NURSE PRACTITIONER

## 2019-03-12 RX ORDER — AZITHROMYCIN 500 MG/1
500 TABLET, FILM COATED ORAL DAILY
Qty: 5 TABLET | Refills: 0 | Status: SHIPPED | OUTPATIENT
Start: 2019-03-12 | End: 2021-04-28

## 2019-03-12 RX ORDER — KETOROLAC TROMETHAMINE 10 MG/1
10 TABLET, FILM COATED ORAL EVERY 6 HOURS
Qty: 20 TABLET | Refills: 0 | Status: SHIPPED | OUTPATIENT
Start: 2019-03-12 | End: 2021-04-28

## 2019-03-12 NOTE — TELEPHONE ENCOUNTER
Patient is asking for some kind of medication to help with her inability to sleep due to the pain in her throat/ears

## 2019-03-12 NOTE — LETTER
March 12, 2019      [facility]  [address] [address] [address]  [facility] [address]  Phone: [phone]  Fax: [phone]       Patient: Wendy Millan   YOB: 1990  Date of Visit: 03/12/2019    To Whom It May Concern:    Vasu Millan  was at [facility] on 03/12/2019. She may return to work/school on 3/14/19 with no restrictions. If you have any questions or concerns, or if I can be of further assistance, please do not hesitate to contact me.    Sincerely,    Carmen Stauffer LPN

## 2019-03-12 NOTE — PROGRESS NOTES
"Subjective:       Patient ID: Wendy Millan is a 28 y.o. female.    Chief Complaint: Sore Throat (Symptoms started Sunday); Generalized Body Aches; Low fever; Ear pain/fullness; and Sinus pressure    URI    This is a new problem. The current episode started in the past 7 days. The problem has been rapidly worsening. The maximum temperature recorded prior to her arrival was 101 - 101.9 F. The fever has been present for 1 to 2 days. Associated symptoms include congestion, coughing, ear pain, headaches, neck pain, rhinorrhea, sneezing, a sore throat and swollen glands. Pertinent negatives include no abdominal pain, chest pain, diarrhea, dysuria, nausea or vomiting. She has tried antihistamine, decongestant, increased fluids and NSAIDs for the symptoms. The treatment provided mild relief.     Review of Systems   Constitutional: Positive for appetite change, chills, fatigue and fever.   HENT: Positive for congestion, ear pain, postnasal drip, rhinorrhea, sinus pressure, sneezing and sore throat.    Eyes: Negative for pain and redness.   Respiratory: Positive for cough. Negative for choking, chest tightness and shortness of breath.    Cardiovascular: Negative for chest pain and palpitations.   Gastrointestinal: Negative for abdominal distention, abdominal pain, constipation, diarrhea, nausea and vomiting.   Endocrine: Negative for polydipsia and polyphagia.   Genitourinary: Negative for dysuria and hematuria.   Musculoskeletal: Positive for neck pain. Negative for arthralgias, joint swelling and myalgias.   Skin: Negative for color change and pallor.   Neurological: Positive for headaches. Negative for dizziness and light-headedness.       Past medical, surgical, family and social history reviewed.  Objective:     Vitals:    03/12/19 0904   BP: 110/60   Pulse: 102   Resp: 18   Temp: 99.3 °F (37.4 °C)   TempSrc: Oral   SpO2: 98%   Weight: 78.4 kg (172 lb 15.2 oz)   Height: 5' 2" (1.575 m)   PainSc:   7   PainLoc: " Ear     Body mass index is 31.63 kg/m².     Physical Exam   Constitutional: She is oriented to person, place, and time. She appears well-developed.   Obese female    HENT:   Head: Normocephalic and atraumatic.   Right Ear: External ear normal. Tympanic membrane mobility is abnormal.   Left Ear: External ear normal. Tympanic membrane is scarred, erythematous and bulging. Tympanic membrane mobility is abnormal.   Nose: Mucosal edema and rhinorrhea present.   Mouth/Throat: Oropharyngeal exudate, posterior oropharyngeal edema and posterior oropharyngeal erythema present.   Eyes: Conjunctivae are normal. Pupils are equal, round, and reactive to light.   Neck: Normal range of motion. Neck supple. No tracheal deviation present.   Cardiovascular: Normal rate and regular rhythm. Exam reveals no gallop and no friction rub.   No murmur heard.  Pulmonary/Chest: Effort normal and breath sounds normal. No stridor. No respiratory distress. She has no wheezes. She has no rales.   Musculoskeletal: Normal range of motion.   Lymphadenopathy:     She has cervical adenopathy.   Neurological: She is alert and oriented to person, place, and time.   Skin: Skin is warm and dry.   Psychiatric: She has a normal mood and affect. Her behavior is normal. Judgment and thought content normal.       Assessment:       1. Strep pharyngitis    2. Sore throat    3. Left otitis media, unspecified otitis media type        Plan:       Wendy was seen today for sore throat, generalized body aches, low fever, ear pain/fullness and sinus pressure.    Diagnoses and all orders for this visit:    Strep pharyngitis  -     penicillin G benzathine (BICILLIN LA) injection 1.2 Million Units  Rapid strep was negative here in the clinic but will treat secondary to severity of symptoms.  We will send swab for culture    Sore throat  -     POCT Rapid Strep A  -     Strep A culture, throat    Left otitis media, unspecified otitis media type  -     azithromycin  (ZITHROMAX) 500 MG tablet; Take 1 tablet (500 mg total) by mouth once daily.      Toradol given for pain.  -     ketorolac (TORADOL) 10 mg tablet; Take 1 tablet (10 mg total) by mouth every 6 (six) hours.

## 2019-03-14 ENCOUNTER — PATIENT MESSAGE (OUTPATIENT)
Dept: FAMILY MEDICINE | Facility: CLINIC | Age: 29
End: 2019-03-14

## 2019-03-14 RX ORDER — PREDNISONE 20 MG/1
TABLET ORAL
Qty: 18 TABLET | Refills: 0 | Status: SHIPPED | OUTPATIENT
Start: 2019-03-14 | End: 2021-04-28

## 2019-03-15 ENCOUNTER — PATIENT MESSAGE (OUTPATIENT)
Dept: FAMILY MEDICINE | Facility: CLINIC | Age: 29
End: 2019-03-15

## 2019-03-15 LAB — BACTERIA THROAT CULT: NORMAL

## 2019-03-25 ENCOUNTER — PATIENT MESSAGE (OUTPATIENT)
Dept: OBSTETRICS AND GYNECOLOGY | Facility: CLINIC | Age: 29
End: 2019-03-25

## 2019-06-13 ENCOUNTER — PATIENT MESSAGE (OUTPATIENT)
Dept: FAMILY MEDICINE | Facility: CLINIC | Age: 29
End: 2019-06-13

## 2019-06-13 DIAGNOSIS — Z11.1 SCREENING-PULMONARY TB: Primary | ICD-10-CM

## 2019-06-18 ENCOUNTER — CLINICAL SUPPORT (OUTPATIENT)
Dept: FAMILY MEDICINE | Facility: CLINIC | Age: 29
End: 2019-06-18
Payer: COMMERCIAL

## 2019-06-18 PROCEDURE — 86580 POCT TB SKIN TEST: ICD-10-PCS | Mod: S$GLB,,, | Performed by: NURSE PRACTITIONER

## 2019-06-18 PROCEDURE — 86580 TB INTRADERMAL TEST: CPT | Mod: S$GLB,,, | Performed by: NURSE PRACTITIONER

## 2019-06-18 NOTE — PROGRESS NOTES
Pt presented to clinic for PPD skin test for entrance requirement for [employer]. 2 pt identifiers used ensure correct pt information obtained. Medication verified by TUNDE Medrano LPN for correct medication, dose, route, frequency, and expiration date. Administration documented in pt's MAR per facility policy. Given to L FA. Pt tolerated well and instructed to remain in clinic in 48 hours to have the TB skin test read. Pt verbalized understanding and denied any additional questions or concerns at this time.

## 2019-06-20 ENCOUNTER — CLINICAL SUPPORT (OUTPATIENT)
Dept: FAMILY MEDICINE | Facility: CLINIC | Age: 29
End: 2019-06-20
Payer: COMMERCIAL

## 2019-06-20 DIAGNOSIS — Z11.1 VISIT FOR TB SKIN TEST: Primary | ICD-10-CM

## 2019-06-20 LAB
TB INDURATION - 48 HR READ: 0 MM
TB INDURATION - 72 HR READ: NORMAL MM
TB SKIN TEST - 48 HR READ: NEGATIVE
TB SKIN TEST - 72 HR READ: NORMAL

## 2019-06-20 RX ORDER — METHYLPREDNISOLONE 4 MG/1
TABLET ORAL
Qty: 1 PACKAGE | Refills: 0 | Status: SHIPPED | OUTPATIENT
Start: 2019-06-20 | End: 2019-07-11

## 2019-06-20 RX ORDER — AMOXICILLIN 875 MG/1
875 TABLET, FILM COATED ORAL EVERY 12 HOURS
Qty: 14 TABLET | Refills: 0 | Status: SHIPPED | OUTPATIENT
Start: 2019-06-20 | End: 2019-06-27

## 2019-06-20 NOTE — PROGRESS NOTES
Pt presents to clinic to have TB skin test read. No induration or increased redness noted to site. Pt will come back in 2 weeks to have a second test done as admission requirements for school.

## 2019-07-03 ENCOUNTER — CLINICAL SUPPORT (OUTPATIENT)
Dept: FAMILY MEDICINE | Facility: CLINIC | Age: 29
End: 2019-07-03
Payer: COMMERCIAL

## 2019-07-03 DIAGNOSIS — Z11.1 VISIT FOR TB SKIN TEST: ICD-10-CM

## 2019-07-03 PROCEDURE — 86580 POCT TB SKIN TEST: ICD-10-PCS | Mod: S$GLB,,, | Performed by: NURSE PRACTITIONER

## 2019-07-03 PROCEDURE — 86580 TB INTRADERMAL TEST: CPT | Mod: S$GLB,,, | Performed by: NURSE PRACTITIONER

## 2019-07-03 NOTE — PROGRESS NOTES
Pt here for second PPD.  PPD given as ordered and as documented to left forearm.  Pt tolerated well.  Next nurse visit already scheduled for PPD read.  Pt verbalizes understanding.

## 2019-07-05 ENCOUNTER — CLINICAL SUPPORT (OUTPATIENT)
Dept: FAMILY MEDICINE | Facility: CLINIC | Age: 29
End: 2019-07-05
Payer: COMMERCIAL

## 2019-07-05 RX ORDER — OMEPRAZOLE 40 MG/1
CAPSULE, DELAYED RELEASE ORAL
Qty: 90 CAPSULE | Refills: 3 | Status: SHIPPED | OUTPATIENT
Start: 2019-07-05 | End: 2019-07-05 | Stop reason: SDUPTHER

## 2019-07-05 RX ORDER — OMEPRAZOLE 40 MG/1
40 CAPSULE, DELAYED RELEASE ORAL DAILY
Qty: 90 CAPSULE | Refills: 3 | Status: SHIPPED | OUTPATIENT
Start: 2019-07-05 | End: 2021-04-28 | Stop reason: SDUPTHER

## 2019-07-05 NOTE — PROGRESS NOTES
Patient presented to clinic for Tb test read. This test was negative for tuberculosis exposure per current Centers for Disease Control guidelines.

## 2019-07-05 NOTE — LETTER
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 The results of this test are as follows:    TB Skin Test - 48 hr read (no units)   Date Value   07/05/2019 Negative     This test was negative for tuberculosis exposure per current Centers for Disease Control guidelines.    A chest x-ray was not required.    If you have any questions or concerns, please don't hesitate to call.    Sincerely,        NURSEEMMIE

## 2019-07-08 ENCOUNTER — PATIENT MESSAGE (OUTPATIENT)
Dept: FAMILY MEDICINE | Facility: CLINIC | Age: 29
End: 2019-07-08

## 2019-08-20 ENCOUNTER — PATIENT MESSAGE (OUTPATIENT)
Dept: FAMILY MEDICINE | Facility: CLINIC | Age: 29
End: 2019-08-20

## 2019-09-08 DIAGNOSIS — R10.9 LEFT SIDED ABDOMINAL PAIN: ICD-10-CM

## 2019-09-08 DIAGNOSIS — N92.1 METRORRHAGIA: ICD-10-CM

## 2019-09-09 RX ORDER — NORETHINDRONE ACETATE AND ETHINYL ESTRADIOL .02; 1 MG/1; MG/1
TABLET ORAL
Qty: 21 TABLET | Refills: 11 | Status: SHIPPED | OUTPATIENT
Start: 2019-09-09 | End: 2020-07-15 | Stop reason: SDUPTHER

## 2020-07-15 DIAGNOSIS — R10.9 LEFT SIDED ABDOMINAL PAIN: ICD-10-CM

## 2020-07-15 DIAGNOSIS — N92.1 METRORRHAGIA: ICD-10-CM

## 2020-07-16 RX ORDER — NORETHINDRONE ACETATE AND ETHINYL ESTRADIOL .02; 1 MG/1; MG/1
1 TABLET ORAL DAILY
Qty: 21 TABLET | Refills: 11 | Status: SHIPPED | OUTPATIENT
Start: 2020-07-16 | End: 2021-04-28

## 2020-10-05 ENCOUNTER — PATIENT MESSAGE (OUTPATIENT)
Dept: ADMINISTRATIVE | Facility: HOSPITAL | Age: 30
End: 2020-10-05

## 2021-01-04 ENCOUNTER — PATIENT MESSAGE (OUTPATIENT)
Dept: ADMINISTRATIVE | Facility: HOSPITAL | Age: 31
End: 2021-01-04

## 2021-03-27 ENCOUNTER — IMMUNIZATION (OUTPATIENT)
Dept: FAMILY MEDICINE | Facility: CLINIC | Age: 31
End: 2021-03-27
Payer: MEDICAID

## 2021-03-27 DIAGNOSIS — Z23 NEED FOR VACCINATION: Primary | ICD-10-CM

## 2021-03-27 PROCEDURE — 91300 COVID-19, MRNA, LNP-S, PF, 30 MCG/0.3 ML DOSE VACCINE: CPT | Mod: PBBFAC | Performed by: FAMILY MEDICINE

## 2021-04-17 ENCOUNTER — IMMUNIZATION (OUTPATIENT)
Dept: FAMILY MEDICINE | Facility: CLINIC | Age: 31
End: 2021-04-17
Payer: MEDICAID

## 2021-04-17 DIAGNOSIS — Z23 NEED FOR VACCINATION: Primary | ICD-10-CM

## 2021-04-17 PROCEDURE — 0002A COVID-19, MRNA, LNP-S, PF, 30 MCG/0.3 ML DOSE VACCINE: CPT | Mod: PBBFAC,PO

## 2021-04-17 PROCEDURE — 91300 COVID-19, MRNA, LNP-S, PF, 30 MCG/0.3 ML DOSE VACCINE: CPT | Mod: PBBFAC,PO

## 2021-04-19 ENCOUNTER — PATIENT MESSAGE (OUTPATIENT)
Dept: FAMILY MEDICINE | Facility: CLINIC | Age: 31
End: 2021-04-19

## 2021-04-28 ENCOUNTER — OFFICE VISIT (OUTPATIENT)
Dept: FAMILY MEDICINE | Facility: CLINIC | Age: 31
End: 2021-04-28
Payer: MEDICAID

## 2021-04-28 VITALS
TEMPERATURE: 98 F | SYSTOLIC BLOOD PRESSURE: 116 MMHG | BODY MASS INDEX: 30.83 KG/M2 | HEIGHT: 62 IN | RESPIRATION RATE: 18 BRPM | HEART RATE: 90 BPM | WEIGHT: 167.56 LBS | DIASTOLIC BLOOD PRESSURE: 78 MMHG

## 2021-04-28 DIAGNOSIS — K58.9 IRRITABLE BOWEL SYNDROME, UNSPECIFIED TYPE: ICD-10-CM

## 2021-04-28 DIAGNOSIS — Z98.890 S/P GASTRIC SURGERY: ICD-10-CM

## 2021-04-28 DIAGNOSIS — Z00.00 LABORATORY EXAM ORDERED AS PART OF ROUTINE GENERAL MEDICAL EXAMINATION: ICD-10-CM

## 2021-04-28 DIAGNOSIS — Z00.00 ROUTINE PHYSICAL EXAMINATION: Primary | ICD-10-CM

## 2021-04-28 DIAGNOSIS — Z83.719 FAMILY HISTORY OF COLONIC POLYPS: ICD-10-CM

## 2021-04-28 DIAGNOSIS — Z11.4 ENCOUNTER FOR SCREENING FOR HIV: ICD-10-CM

## 2021-04-28 DIAGNOSIS — K21.9 GASTROESOPHAGEAL REFLUX DISEASE, UNSPECIFIED WHETHER ESOPHAGITIS PRESENT: ICD-10-CM

## 2021-04-28 DIAGNOSIS — F41.9 ANXIETY: ICD-10-CM

## 2021-04-28 DIAGNOSIS — Z11.59 NEED FOR HEPATITIS C SCREENING TEST: ICD-10-CM

## 2021-04-28 LAB
25(OH)D3+25(OH)D2 SERPL-MCNC: 16 NG/ML (ref 30–96)
ALBUMIN SERPL BCP-MCNC: 4 G/DL (ref 3.5–5.2)
ALP SERPL-CCNC: 51 U/L (ref 55–135)
ALT SERPL W/O P-5'-P-CCNC: 12 U/L (ref 10–44)
ANION GAP SERPL CALC-SCNC: 11 MMOL/L (ref 8–16)
AST SERPL-CCNC: 18 U/L (ref 10–40)
BASOPHILS # BLD AUTO: 0.03 K/UL (ref 0–0.2)
BASOPHILS NFR BLD: 0.7 % (ref 0–1.9)
BILIRUB SERPL-MCNC: 0.4 MG/DL (ref 0.1–1)
BUN SERPL-MCNC: 8 MG/DL (ref 6–20)
CALCIUM SERPL-MCNC: 8.8 MG/DL (ref 8.7–10.5)
CHLORIDE SERPL-SCNC: 107 MMOL/L (ref 95–110)
CHOLEST SERPL-MCNC: 135 MG/DL (ref 120–199)
CHOLEST/HDLC SERPL: 2 {RATIO} (ref 2–5)
CO2 SERPL-SCNC: 24 MMOL/L (ref 23–29)
CREAT SERPL-MCNC: 0.7 MG/DL (ref 0.5–1.4)
DIFFERENTIAL METHOD: ABNORMAL
EOSINOPHIL # BLD AUTO: 0.1 K/UL (ref 0–0.5)
EOSINOPHIL NFR BLD: 2 % (ref 0–8)
ERYTHROCYTE [DISTWIDTH] IN BLOOD BY AUTOMATED COUNT: 14.4 % (ref 11.5–14.5)
EST. GFR  (AFRICAN AMERICAN): >60 ML/MIN/1.73 M^2
EST. GFR  (NON AFRICAN AMERICAN): >60 ML/MIN/1.73 M^2
FERRITIN SERPL-MCNC: 7 NG/ML (ref 20–300)
FOLATE SERPL-MCNC: 14 NG/ML (ref 4–24)
GLUCOSE SERPL-MCNC: 76 MG/DL (ref 70–110)
HCT VFR BLD AUTO: 39.4 % (ref 37–48.5)
HDLC SERPL-MCNC: 66 MG/DL (ref 40–75)
HDLC SERPL: 48.9 % (ref 20–50)
HGB BLD-MCNC: 12.4 G/DL (ref 12–16)
IMM GRANULOCYTES # BLD AUTO: 0.01 K/UL (ref 0–0.04)
IMM GRANULOCYTES NFR BLD AUTO: 0.2 % (ref 0–0.5)
IRON SERPL-MCNC: 71 UG/DL (ref 30–160)
LDLC SERPL CALC-MCNC: 62.4 MG/DL (ref 63–159)
LYMPHOCYTES # BLD AUTO: 1.2 K/UL (ref 1–4.8)
LYMPHOCYTES NFR BLD: 26.3 % (ref 18–48)
MAGNESIUM SERPL-MCNC: 2.1 MG/DL (ref 1.6–2.6)
MCH RBC QN AUTO: 25.4 PG (ref 27–31)
MCHC RBC AUTO-ENTMCNC: 31.5 G/DL (ref 32–36)
MCV RBC AUTO: 81 FL (ref 82–98)
MONOCYTES # BLD AUTO: 0.4 K/UL (ref 0.3–1)
MONOCYTES NFR BLD: 8.2 % (ref 4–15)
NEUTROPHILS # BLD AUTO: 2.8 K/UL (ref 1.8–7.7)
NEUTROPHILS NFR BLD: 62.6 % (ref 38–73)
NONHDLC SERPL-MCNC: 69 MG/DL
NRBC BLD-RTO: 0 /100 WBC
PHOSPHATE SERPL-MCNC: 3.6 MG/DL (ref 2.7–4.5)
PLATELET # BLD AUTO: 324 K/UL (ref 150–450)
PMV BLD AUTO: 11.8 FL (ref 9.2–12.9)
POTASSIUM SERPL-SCNC: 3.9 MMOL/L (ref 3.5–5.1)
PROT SERPL-MCNC: 7.2 G/DL (ref 6–8.4)
PTH-INTACT SERPL-MCNC: 35 PG/ML (ref 9–77)
RBC # BLD AUTO: 4.88 M/UL (ref 4–5.4)
SATURATED IRON: 14 % (ref 20–50)
SODIUM SERPL-SCNC: 142 MMOL/L (ref 136–145)
TOTAL IRON BINDING CAPACITY: 505 UG/DL (ref 250–450)
TRANSFERRIN SERPL-MCNC: 341 MG/DL (ref 200–375)
TRIGL SERPL-MCNC: 33 MG/DL (ref 30–150)
TSH SERPL DL<=0.005 MIU/L-ACNC: 0.85 UIU/ML (ref 0.4–4)
URATE SERPL-MCNC: 4.6 MG/DL (ref 2.4–5.7)
VIT B12 SERPL-MCNC: 328 PG/ML (ref 210–950)
WBC # BLD AUTO: 4.52 K/UL (ref 3.9–12.7)

## 2021-04-28 PROCEDURE — 84443 ASSAY THYROID STIM HORMONE: CPT | Performed by: NURSE PRACTITIONER

## 2021-04-28 PROCEDURE — 86803 HEPATITIS C AB TEST: CPT | Performed by: NURSE PRACTITIONER

## 2021-04-28 PROCEDURE — 83036 HEMOGLOBIN GLYCOSYLATED A1C: CPT | Performed by: NURSE PRACTITIONER

## 2021-04-28 PROCEDURE — 82607 VITAMIN B-12: CPT | Performed by: NURSE PRACTITIONER

## 2021-04-28 PROCEDURE — 83970 ASSAY OF PARATHORMONE: CPT | Performed by: NURSE PRACTITIONER

## 2021-04-28 PROCEDURE — 99395 PREV VISIT EST AGE 18-39: CPT | Mod: S$GLB,,, | Performed by: NURSE PRACTITIONER

## 2021-04-28 PROCEDURE — 82728 ASSAY OF FERRITIN: CPT | Performed by: NURSE PRACTITIONER

## 2021-04-28 PROCEDURE — 84100 ASSAY OF PHOSPHORUS: CPT | Performed by: NURSE PRACTITIONER

## 2021-04-28 PROCEDURE — 86703 HIV-1/HIV-2 1 RESULT ANTBDY: CPT | Performed by: NURSE PRACTITIONER

## 2021-04-28 PROCEDURE — 82306 VITAMIN D 25 HYDROXY: CPT | Performed by: NURSE PRACTITIONER

## 2021-04-28 PROCEDURE — 80053 COMPREHEN METABOLIC PANEL: CPT | Performed by: NURSE PRACTITIONER

## 2021-04-28 PROCEDURE — 84425 ASSAY OF VITAMIN B-1: CPT | Performed by: NURSE PRACTITIONER

## 2021-04-28 PROCEDURE — 36415 PR COLLECTION VENOUS BLOOD,VENIPUNCTURE: ICD-10-PCS | Mod: S$GLB,,, | Performed by: NURSE PRACTITIONER

## 2021-04-28 PROCEDURE — 83540 ASSAY OF IRON: CPT | Performed by: NURSE PRACTITIONER

## 2021-04-28 PROCEDURE — 80061 LIPID PANEL: CPT | Performed by: NURSE PRACTITIONER

## 2021-04-28 PROCEDURE — 85025 COMPLETE CBC W/AUTO DIFF WBC: CPT | Performed by: NURSE PRACTITIONER

## 2021-04-28 PROCEDURE — 84550 ASSAY OF BLOOD/URIC ACID: CPT | Performed by: NURSE PRACTITIONER

## 2021-04-28 PROCEDURE — 99395 PR PREVENTIVE VISIT,EST,18-39: ICD-10-PCS | Mod: S$GLB,,, | Performed by: NURSE PRACTITIONER

## 2021-04-28 PROCEDURE — 36415 COLL VENOUS BLD VENIPUNCTURE: CPT | Mod: S$GLB,,, | Performed by: NURSE PRACTITIONER

## 2021-04-28 PROCEDURE — 83735 ASSAY OF MAGNESIUM: CPT | Performed by: NURSE PRACTITIONER

## 2021-04-28 PROCEDURE — 82746 ASSAY OF FOLIC ACID SERUM: CPT | Performed by: NURSE PRACTITIONER

## 2021-04-28 RX ORDER — HYDROXYZINE HYDROCHLORIDE 25 MG/1
25 TABLET, FILM COATED ORAL 2 TIMES DAILY PRN
Qty: 30 TABLET | Refills: 1 | Status: SHIPPED | OUTPATIENT
Start: 2021-04-28 | End: 2021-04-30

## 2021-04-28 RX ORDER — OMEPRAZOLE 40 MG/1
40 CAPSULE, DELAYED RELEASE ORAL DAILY
Qty: 90 CAPSULE | Refills: 3 | Status: SHIPPED | OUTPATIENT
Start: 2021-04-28 | End: 2022-06-12 | Stop reason: SDUPTHER

## 2021-04-28 RX ORDER — DICYCLOMINE HYDROCHLORIDE 10 MG/1
10 CAPSULE ORAL
Qty: 120 CAPSULE | Refills: 0 | Status: SHIPPED | OUTPATIENT
Start: 2021-04-28 | End: 2021-05-20

## 2021-04-29 ENCOUNTER — PATIENT MESSAGE (OUTPATIENT)
Dept: FAMILY MEDICINE | Facility: CLINIC | Age: 31
End: 2021-04-29

## 2021-04-29 DIAGNOSIS — E61.1 IRON DEFICIENCY: Primary | ICD-10-CM

## 2021-04-29 LAB
ESTIMATED AVG GLUCOSE: 94 MG/DL (ref 68–131)
HBA1C MFR BLD: 4.9 % (ref 4–5.6)
HCV AB SERPL QL IA: NEGATIVE
HIV 1+2 AB+HIV1 P24 AG SERPL QL IA: NEGATIVE

## 2021-04-30 ENCOUNTER — PATIENT MESSAGE (OUTPATIENT)
Dept: FAMILY MEDICINE | Facility: CLINIC | Age: 31
End: 2021-04-30

## 2021-04-30 RX ORDER — HYDROXYZINE HYDROCHLORIDE 10 MG/1
10 TABLET, FILM COATED ORAL 3 TIMES DAILY PRN
Qty: 30 TABLET | Refills: 0 | Status: SHIPPED | OUTPATIENT
Start: 2021-04-30 | End: 2022-09-05 | Stop reason: SDUPTHER

## 2021-05-04 LAB — VIT B1 BLD-MCNC: 48 UG/L (ref 38–122)

## 2021-06-29 ENCOUNTER — PATIENT MESSAGE (OUTPATIENT)
Dept: FAMILY MEDICINE | Facility: CLINIC | Age: 31
End: 2021-06-29

## 2021-06-29 DIAGNOSIS — Z11.1 SCREENING-PULMONARY TB: Primary | ICD-10-CM

## 2021-06-30 ENCOUNTER — TELEPHONE (OUTPATIENT)
Dept: FAMILY MEDICINE | Facility: CLINIC | Age: 31
End: 2021-06-30

## 2021-07-01 ENCOUNTER — PATIENT MESSAGE (OUTPATIENT)
Dept: FAMILY MEDICINE | Facility: CLINIC | Age: 31
End: 2021-07-01

## 2021-07-01 DIAGNOSIS — Z23 IMMUNIZATION DUE: Primary | ICD-10-CM

## 2021-07-02 ENCOUNTER — CLINICAL SUPPORT (OUTPATIENT)
Dept: FAMILY MEDICINE | Facility: CLINIC | Age: 31
End: 2021-07-02
Payer: MEDICAID

## 2021-07-02 DIAGNOSIS — Z23 IMMUNIZATION DUE: Primary | ICD-10-CM

## 2021-07-02 PROCEDURE — 90471 IMMUNIZATION ADMIN: CPT | Mod: S$GLB,,, | Performed by: NURSE PRACTITIONER

## 2021-07-02 PROCEDURE — 90471 VARICELLA VACCINE SQ: ICD-10-PCS | Mod: S$GLB,,, | Performed by: NURSE PRACTITIONER

## 2021-07-02 PROCEDURE — 90716 VAR VACCINE LIVE SUBQ: CPT | Mod: S$GLB,,, | Performed by: NURSE PRACTITIONER

## 2021-07-02 PROCEDURE — 90716 VARICELLA VACCINE SQ: ICD-10-PCS | Mod: S$GLB,,, | Performed by: NURSE PRACTITIONER

## 2021-07-03 LAB
GAMMA INTERFERON BACKGROUND BLD IA-ACNC: 0 IU/ML
M TB IFN-G BLD-IMP: NEGATIVE
M TB IFN-G CD4+ BCKGRND COR BLD-ACNC: 0 IU/ML
MITOGEN IGNF BLD-ACNC: >10 IU/ML
QUANTIFERON TB GOLD (INCUBATED): NORMAL
QUANTIFERON TB2 AG VALUE: 0 IU/ML
SERVICE CMNT-IMP: NORMAL

## 2021-07-06 ENCOUNTER — PATIENT MESSAGE (OUTPATIENT)
Dept: FAMILY MEDICINE | Facility: CLINIC | Age: 31
End: 2021-07-06

## 2021-07-23 ENCOUNTER — PATIENT MESSAGE (OUTPATIENT)
Dept: FAMILY MEDICINE | Facility: CLINIC | Age: 31
End: 2021-07-23

## 2021-07-23 ENCOUNTER — TELEPHONE (OUTPATIENT)
Dept: FAMILY MEDICINE | Facility: CLINIC | Age: 31
End: 2021-07-23

## 2021-07-23 RX ORDER — PROMETHAZINE HYDROCHLORIDE 25 MG/1
25 TABLET ORAL EVERY 4 HOURS
Qty: 30 TABLET | Refills: 0 | Status: SHIPPED | OUTPATIENT
Start: 2021-07-23

## 2021-08-23 ENCOUNTER — PATIENT MESSAGE (OUTPATIENT)
Dept: FAMILY MEDICINE | Facility: CLINIC | Age: 31
End: 2021-08-23

## 2021-08-23 RX ORDER — ALBUTEROL SULFATE 90 UG/1
2 AEROSOL, METERED RESPIRATORY (INHALATION) EVERY 6 HOURS PRN
Qty: 18 G | Refills: 0 | Status: SHIPPED | OUTPATIENT
Start: 2021-08-23 | End: 2021-09-12

## 2021-11-18 ENCOUNTER — PATIENT MESSAGE (OUTPATIENT)
Dept: FAMILY MEDICINE | Facility: CLINIC | Age: 31
End: 2021-11-18
Payer: MEDICAID

## 2021-11-22 ENCOUNTER — OFFICE VISIT (OUTPATIENT)
Dept: FAMILY MEDICINE | Facility: CLINIC | Age: 31
End: 2021-11-22
Payer: MEDICAID

## 2021-11-22 VITALS
WEIGHT: 180 LBS | RESPIRATION RATE: 18 BRPM | HEIGHT: 62 IN | DIASTOLIC BLOOD PRESSURE: 78 MMHG | OXYGEN SATURATION: 99 % | BODY MASS INDEX: 33.13 KG/M2 | HEART RATE: 77 BPM | TEMPERATURE: 98 F | SYSTOLIC BLOOD PRESSURE: 120 MMHG

## 2021-11-22 DIAGNOSIS — R19.5 STOOL COLOR ABNORMAL: Primary | ICD-10-CM

## 2021-11-22 PROCEDURE — 99214 PR OFFICE/OUTPT VISIT, EST, LEVL IV, 30-39 MIN: ICD-10-PCS | Mod: S$GLB,,, | Performed by: NURSE PRACTITIONER

## 2021-11-22 PROCEDURE — 99214 OFFICE O/P EST MOD 30 MIN: CPT | Mod: S$GLB,,, | Performed by: NURSE PRACTITIONER

## 2021-11-22 RX ORDER — FLUTICASONE PROPIONATE 50 MCG
SPRAY, SUSPENSION (ML) NASAL
COMMUNITY
Start: 2021-09-21 | End: 2021-12-22 | Stop reason: SDUPTHER

## 2021-12-16 ENCOUNTER — OFFICE VISIT (OUTPATIENT)
Dept: FAMILY MEDICINE | Facility: CLINIC | Age: 31
End: 2021-12-16
Payer: MEDICAID

## 2021-12-16 VITALS
DIASTOLIC BLOOD PRESSURE: 72 MMHG | WEIGHT: 177.38 LBS | BODY MASS INDEX: 32.64 KG/M2 | RESPIRATION RATE: 18 BRPM | HEART RATE: 101 BPM | SYSTOLIC BLOOD PRESSURE: 114 MMHG | TEMPERATURE: 98 F | HEIGHT: 62 IN

## 2021-12-16 DIAGNOSIS — Z01.419 WELL WOMAN EXAM: Primary | ICD-10-CM

## 2021-12-16 DIAGNOSIS — Z12.4 CERVICAL CANCER SCREENING: ICD-10-CM

## 2021-12-16 PROCEDURE — 99395 PREV VISIT EST AGE 18-39: CPT | Mod: S$GLB,,, | Performed by: NURSE PRACTITIONER

## 2021-12-16 PROCEDURE — 88175 CYTOPATH C/V AUTO FLUID REDO: CPT | Performed by: NURSE PRACTITIONER

## 2021-12-16 PROCEDURE — 99395 PR PREVENTIVE VISIT,EST,18-39: ICD-10-PCS | Mod: S$GLB,,, | Performed by: NURSE PRACTITIONER

## 2021-12-21 ENCOUNTER — PATIENT MESSAGE (OUTPATIENT)
Dept: FAMILY MEDICINE | Facility: CLINIC | Age: 31
End: 2021-12-21
Payer: MEDICAID

## 2021-12-21 LAB
CYTOLOGIST CVX/VAG CYTO: NORMAL
CYTOLOGY CVX/VAG DOC CYTO: NORMAL
CYTOLOGY CVX/VAG DOC THIN PREP: NORMAL
CYTOLOGY THINPREP PAP COMMENT: NORMAL
CYTOLOGY THINPREP PAP COMMENT: NORMAL
PAP NOTE: NORMAL
STAT OF ADQ CVX/VAG CYTO-IMP: NORMAL

## 2021-12-22 RX ORDER — FLUTICASONE PROPIONATE 50 MCG
2 SPRAY, SUSPENSION (ML) NASAL DAILY
Qty: 9.9 ML | Refills: 11 | Status: SHIPPED | OUTPATIENT
Start: 2021-12-22 | End: 2022-06-12 | Stop reason: SDUPTHER

## 2022-01-25 ENCOUNTER — PATIENT MESSAGE (OUTPATIENT)
Dept: FAMILY MEDICINE | Facility: CLINIC | Age: 32
End: 2022-01-25
Payer: MEDICAID

## 2022-01-26 RX ORDER — PROMETHAZINE HYDROCHLORIDE AND CODEINE PHOSPHATE 6.25; 1 MG/5ML; MG/5ML
5 SOLUTION ORAL EVERY 4 HOURS PRN
Qty: 120 ML | Refills: 0 | Status: SHIPPED | OUTPATIENT
Start: 2022-01-26 | End: 2022-02-05

## 2022-01-29 ENCOUNTER — PATIENT MESSAGE (OUTPATIENT)
Dept: FAMILY MEDICINE | Facility: CLINIC | Age: 32
End: 2022-01-29
Payer: MEDICAID

## 2022-01-31 ENCOUNTER — PATIENT MESSAGE (OUTPATIENT)
Dept: FAMILY MEDICINE | Facility: CLINIC | Age: 32
End: 2022-01-31
Payer: MEDICAID

## 2022-02-16 ENCOUNTER — OCCUPATIONAL HEALTH (OUTPATIENT)
Dept: URGENT CARE | Facility: CLINIC | Age: 32
End: 2022-02-16

## 2022-02-16 DIAGNOSIS — Z13.9 ENCOUNTER FOR SCREENING: ICD-10-CM

## 2022-02-16 DIAGNOSIS — Z02.83 ENCOUNTER FOR DRUG SCREENING: Primary | ICD-10-CM

## 2022-02-16 LAB
CTP QC/QA: YES
POC 5 PANEL DRUG SCREEN: NEGATIVE

## 2022-02-16 PROCEDURE — 80305 DRUG TEST PRSMV DIR OPT OBS: CPT | Mod: S$GLB,,, | Performed by: NURSE PRACTITIONER

## 2022-02-16 PROCEDURE — 80305 POCT RAPID DRUG SCREEN 5 PANEL: ICD-10-PCS | Mod: S$GLB,,, | Performed by: NURSE PRACTITIONER

## 2022-02-16 NOTE — PROGRESS NOTES
CB- Pt did not get Tb test because she cannot get it read on time.  She will come back on Friday 2/18/2022 to get TB done.

## 2022-02-17 PROCEDURE — 86580 POCT TB SKIN TEST: ICD-10-PCS | Mod: S$GLB,,, | Performed by: FAMILY MEDICINE

## 2022-02-17 PROCEDURE — 86580 TB INTRADERMAL TEST: CPT | Mod: S$GLB,,, | Performed by: FAMILY MEDICINE

## 2022-02-20 LAB
TB INDURATION - 48 HR READ: 0 MM
TB INDURATION - 72 HR READ: 0 MM
TB SKIN TEST - 48 HR READ: NEGATIVE
TB SKIN TEST - 72 HR READ: NEGATIVE

## 2022-06-12 DIAGNOSIS — K21.9 GASTROESOPHAGEAL REFLUX DISEASE, UNSPECIFIED WHETHER ESOPHAGITIS PRESENT: ICD-10-CM

## 2022-06-13 RX ORDER — FLUTICASONE PROPIONATE 50 MCG
2 SPRAY, SUSPENSION (ML) NASAL DAILY
Qty: 9.9 ML | Refills: 3 | Status: SHIPPED | OUTPATIENT
Start: 2022-06-13 | End: 2023-12-03 | Stop reason: SDUPTHER

## 2022-06-13 RX ORDER — OMEPRAZOLE 40 MG/1
40 CAPSULE, DELAYED RELEASE ORAL DAILY
Qty: 90 CAPSULE | Refills: 3 | Status: SHIPPED | OUTPATIENT
Start: 2022-06-13 | End: 2023-06-14

## 2022-08-28 ENCOUNTER — PATIENT MESSAGE (OUTPATIENT)
Dept: FAMILY MEDICINE | Facility: CLINIC | Age: 32
End: 2022-08-28
Payer: MEDICAID

## 2022-08-29 RX ORDER — DEXAMETHASONE 4 MG/1
TABLET ORAL
Qty: 14 TABLET | Refills: 0 | Status: SHIPPED | OUTPATIENT
Start: 2022-08-29 | End: 2023-01-13

## 2022-11-29 RX ORDER — HYDROXYZINE HYDROCHLORIDE 10 MG/1
10 TABLET, FILM COATED ORAL 3 TIMES DAILY PRN
Qty: 30 TABLET | Refills: 0 | Status: SHIPPED | OUTPATIENT
Start: 2022-11-29 | End: 2023-04-13 | Stop reason: SDUPTHER

## 2022-12-01 ENCOUNTER — PATIENT MESSAGE (OUTPATIENT)
Dept: FAMILY MEDICINE | Facility: CLINIC | Age: 32
End: 2022-12-01
Payer: MEDICAID

## 2023-01-13 ENCOUNTER — OFFICE VISIT (OUTPATIENT)
Dept: FAMILY MEDICINE | Facility: CLINIC | Age: 33
End: 2023-01-13
Payer: COMMERCIAL

## 2023-01-13 VITALS
HEIGHT: 62 IN | DIASTOLIC BLOOD PRESSURE: 72 MMHG | OXYGEN SATURATION: 98 % | BODY MASS INDEX: 34.67 KG/M2 | SYSTOLIC BLOOD PRESSURE: 116 MMHG | WEIGHT: 188.38 LBS | HEART RATE: 89 BPM | RESPIRATION RATE: 20 BRPM

## 2023-01-13 DIAGNOSIS — J98.9 REACTIVE AIRWAY DISEASE WITHOUT ASTHMA: ICD-10-CM

## 2023-01-13 DIAGNOSIS — J35.1 ENLARGED TONSILS: ICD-10-CM

## 2023-01-13 DIAGNOSIS — R19.5 CHANGE IN STOOL: ICD-10-CM

## 2023-01-13 DIAGNOSIS — Z00.00 LABORATORY EXAM ORDERED AS PART OF ROUTINE GENERAL MEDICAL EXAMINATION: ICD-10-CM

## 2023-01-13 DIAGNOSIS — Z00.00 ROUTINE PHYSICAL EXAMINATION: Primary | ICD-10-CM

## 2023-01-13 DIAGNOSIS — J32.9 SINUSITIS, UNSPECIFIED CHRONICITY, UNSPECIFIED LOCATION: ICD-10-CM

## 2023-01-13 PROCEDURE — 99395 PREV VISIT EST AGE 18-39: CPT | Mod: S$GLB,,, | Performed by: NURSE PRACTITIONER

## 2023-01-13 PROCEDURE — 3074F PR MOST RECENT SYSTOLIC BLOOD PRESSURE < 130 MM HG: ICD-10-PCS | Mod: CPTII,S$GLB,, | Performed by: NURSE PRACTITIONER

## 2023-01-13 PROCEDURE — 1160F RVW MEDS BY RX/DR IN RCRD: CPT | Mod: CPTII,S$GLB,, | Performed by: NURSE PRACTITIONER

## 2023-01-13 PROCEDURE — 1160F PR REVIEW ALL MEDS BY PRESCRIBER/CLIN PHARMACIST DOCUMENTED: ICD-10-PCS | Mod: CPTII,S$GLB,, | Performed by: NURSE PRACTITIONER

## 2023-01-13 PROCEDURE — 1159F MED LIST DOCD IN RCRD: CPT | Mod: CPTII,S$GLB,, | Performed by: NURSE PRACTITIONER

## 2023-01-13 PROCEDURE — 3078F DIAST BP <80 MM HG: CPT | Mod: CPTII,S$GLB,, | Performed by: NURSE PRACTITIONER

## 2023-01-13 PROCEDURE — 3078F PR MOST RECENT DIASTOLIC BLOOD PRESSURE < 80 MM HG: ICD-10-PCS | Mod: CPTII,S$GLB,, | Performed by: NURSE PRACTITIONER

## 2023-01-13 PROCEDURE — 3008F BODY MASS INDEX DOCD: CPT | Mod: CPTII,S$GLB,, | Performed by: NURSE PRACTITIONER

## 2023-01-13 PROCEDURE — 3074F SYST BP LT 130 MM HG: CPT | Mod: CPTII,S$GLB,, | Performed by: NURSE PRACTITIONER

## 2023-01-13 PROCEDURE — 99395 PR PREVENTIVE VISIT,EST,18-39: ICD-10-PCS | Mod: S$GLB,,, | Performed by: NURSE PRACTITIONER

## 2023-01-13 PROCEDURE — 3008F PR BODY MASS INDEX (BMI) DOCUMENTED: ICD-10-PCS | Mod: CPTII,S$GLB,, | Performed by: NURSE PRACTITIONER

## 2023-01-13 PROCEDURE — 1159F PR MEDICATION LIST DOCUMENTED IN MEDICAL RECORD: ICD-10-PCS | Mod: CPTII,S$GLB,, | Performed by: NURSE PRACTITIONER

## 2023-01-13 RX ORDER — PREDNISONE 20 MG/1
40 TABLET ORAL DAILY
Qty: 10 TABLET | Refills: 0 | Status: SHIPPED | OUTPATIENT
Start: 2023-01-13 | End: 2023-01-18

## 2023-01-13 RX ORDER — DOXYCYCLINE 100 MG/1
100 CAPSULE ORAL 2 TIMES DAILY
Qty: 14 CAPSULE | Refills: 0 | Status: SHIPPED | OUTPATIENT
Start: 2023-01-13 | End: 2023-01-20

## 2023-01-13 NOTE — PROGRESS NOTES
"Subjective:       Patient ID: Wendy Millan is a 32 y.o. female.    Chief Complaint: Follow-up  The patient is here for routine physical. Patient is generally feeling well without any complaints today.    HPI  Review of Systems   Constitutional:  Positive for fatigue. Negative for activity change, appetite change, chills and fever.   HENT:  Positive for sneezing and sore throat. Negative for congestion, ear pain, postnasal drip, rhinorrhea and sinus pressure.    Eyes:  Negative for pain, redness and itching.   Respiratory:  Positive for cough and wheezing. Negative for choking, chest tightness and shortness of breath.    Cardiovascular:  Negative for chest pain and palpitations.   Gastrointestinal:  Negative for abdominal distention, abdominal pain, blood in stool, constipation, diarrhea, nausea and vomiting.   Endocrine: Negative for polydipsia, polyphagia and polyuria.   Genitourinary:  Negative for difficulty urinating, dysuria, flank pain and hematuria.   Musculoskeletal:  Negative for arthralgias, joint swelling and myalgias.   Skin:  Negative for color change, pallor and rash.   Neurological:  Negative for dizziness, light-headedness and headaches.   Hematological:  Negative for adenopathy. Does not bruise/bleed easily.   Psychiatric/Behavioral:  Negative for agitation and behavioral problems.      Past medical, surgical, family and social history reviewed.  Objective:     Vitals:    01/13/23 1542   BP: 116/72   Pulse: 89   Resp: 20   SpO2: 98%   Weight: 85.5 kg (188 lb 6.1 oz)   Height: 5' 2" (1.575 m)   PainSc: 0-No pain     Body mass index is 34.46 kg/m².     Physical Exam  Constitutional:       General: She is not in acute distress.     Appearance: She is well-developed. She is not diaphoretic.   HENT:      Head: Normocephalic and atraumatic.      Right Ear: Hearing, tympanic membrane, ear canal and external ear normal.      Left Ear: Hearing, tympanic membrane, ear canal and external ear normal. "      Nose: Nose normal. No laceration.      Mouth/Throat:      Pharynx: Uvula midline. Posterior oropharyngeal erythema present.   Eyes:      General: No scleral icterus.        Right eye: No discharge.         Left eye: No discharge.      Conjunctiva/sclera: Conjunctivae normal.      Pupils: Pupils are equal, round, and reactive to light.   Neck:      Thyroid: No thyromegaly.      Vascular: No carotid bruit or JVD.      Trachea: Trachea normal. No tracheal deviation.   Cardiovascular:      Rate and Rhythm: Normal rate and regular rhythm.      Heart sounds: No murmur heard.    No friction rub. No gallop.   Pulmonary:      Effort: Pulmonary effort is normal. No respiratory distress.      Breath sounds: Normal breath sounds. No stridor. No wheezing or rales.   Chest:      Chest wall: No tenderness.   Abdominal:      General: Bowel sounds are normal. There is no distension.      Palpations: Abdomen is soft. There is no mass.      Tenderness: There is no abdominal tenderness. There is no guarding or rebound.   Musculoskeletal:         General: Normal range of motion.      Cervical back: Normal range of motion and neck supple.   Lymphadenopathy:      Cervical: Cervical adenopathy present.   Skin:     General: Skin is warm and dry.   Neurological:      Mental Status: She is alert and oriented to person, place, and time.      Coordination: Coordination normal.   Psychiatric:         Behavior: Behavior normal.         Thought Content: Thought content normal.         Judgment: Judgment normal.       Assessment:       1. Routine physical examination    2. Laboratory exam ordered as part of routine general medical examination    3. Enlarged tonsils    4. Change in stool    5. Sinusitis, unspecified chronicity, unspecified location    6. Reactive airway disease without asthma        Plan:       Wendy was seen today for follow-up.    Diagnoses and all orders for this visit:    Routine physical examination    Laboratory exam  ordered as part of routine general medical examination  -     CBC Auto Differential; Future  -     Comprehensive Metabolic Panel; Future  -     Hemoglobin A1C; Future  -     Lipid Panel; Future  -     TSH; Future    Enlarged tonsils  -     Ambulatory referral/consult to ENT; Future    Change in stool  -     Ambulatory referral/consult to Gastroenterology; Future    Sinusitis, unspecified chronicity, unspecified location  -     doxycycline (VIBRAMYCIN) 100 MG Cap; Take 1 capsule (100 mg total) by mouth 2 (two) times daily. for 7 days    Reactive airway disease without asthma  -     predniSONE (DELTASONE) 20 MG tablet; Take 2 tablets (40 mg total) by mouth once daily. for 5 days

## 2023-01-18 ENCOUNTER — LAB VISIT (OUTPATIENT)
Dept: LAB | Facility: HOSPITAL | Age: 33
End: 2023-01-18
Attending: NURSE PRACTITIONER
Payer: COMMERCIAL

## 2023-01-18 DIAGNOSIS — Z00.00 LABORATORY EXAM ORDERED AS PART OF ROUTINE GENERAL MEDICAL EXAMINATION: ICD-10-CM

## 2023-01-18 LAB
ALBUMIN SERPL BCP-MCNC: 3.8 G/DL (ref 3.5–5.2)
ALP SERPL-CCNC: 53 U/L (ref 55–135)
ALT SERPL W/O P-5'-P-CCNC: 16 U/L (ref 10–44)
ANION GAP SERPL CALC-SCNC: 8 MMOL/L (ref 8–16)
AST SERPL-CCNC: 15 U/L (ref 10–40)
BASOPHILS # BLD AUTO: 0.03 K/UL (ref 0–0.2)
BASOPHILS NFR BLD: 0.4 % (ref 0–1.9)
BILIRUB SERPL-MCNC: 0.4 MG/DL (ref 0.1–1)
BUN SERPL-MCNC: 13 MG/DL (ref 6–20)
CALCIUM SERPL-MCNC: 9.2 MG/DL (ref 8.7–10.5)
CHLORIDE SERPL-SCNC: 106 MMOL/L (ref 95–110)
CHOLEST SERPL-MCNC: 142 MG/DL (ref 120–199)
CHOLEST/HDLC SERPL: 2.1 {RATIO} (ref 2–5)
CO2 SERPL-SCNC: 25 MMOL/L (ref 23–29)
CREAT SERPL-MCNC: 0.7 MG/DL (ref 0.5–1.4)
DIFFERENTIAL METHOD: ABNORMAL
EOSINOPHIL # BLD AUTO: 0.1 K/UL (ref 0–0.5)
EOSINOPHIL NFR BLD: 1.2 % (ref 0–8)
ERYTHROCYTE [DISTWIDTH] IN BLOOD BY AUTOMATED COUNT: 14.6 % (ref 11.5–14.5)
EST. GFR  (NO RACE VARIABLE): >60 ML/MIN/1.73 M^2
ESTIMATED AVG GLUCOSE: 103 MG/DL (ref 68–131)
GLUCOSE SERPL-MCNC: 82 MG/DL (ref 70–110)
HBA1C MFR BLD: 5.2 % (ref 4–5.6)
HCT VFR BLD AUTO: 37.9 % (ref 37–48.5)
HDLC SERPL-MCNC: 68 MG/DL (ref 40–75)
HDLC SERPL: 47.9 % (ref 20–50)
HGB BLD-MCNC: 12 G/DL (ref 12–16)
IMM GRANULOCYTES # BLD AUTO: 0.01 K/UL (ref 0–0.04)
IMM GRANULOCYTES NFR BLD AUTO: 0.1 % (ref 0–0.5)
LDLC SERPL CALC-MCNC: 65.2 MG/DL (ref 63–159)
LYMPHOCYTES # BLD AUTO: 2.8 K/UL (ref 1–4.8)
LYMPHOCYTES NFR BLD: 33 % (ref 18–48)
MCH RBC QN AUTO: 25.3 PG (ref 27–31)
MCHC RBC AUTO-ENTMCNC: 31.7 G/DL (ref 32–36)
MCV RBC AUTO: 80 FL (ref 82–98)
MONOCYTES # BLD AUTO: 0.8 K/UL (ref 0.3–1)
MONOCYTES NFR BLD: 9.1 % (ref 4–15)
NEUTROPHILS # BLD AUTO: 4.8 K/UL (ref 1.8–7.7)
NEUTROPHILS NFR BLD: 56.2 % (ref 38–73)
NONHDLC SERPL-MCNC: 74 MG/DL
NRBC BLD-RTO: 0 /100 WBC
PLATELET # BLD AUTO: 387 K/UL (ref 150–450)
PMV BLD AUTO: 11.1 FL (ref 9.2–12.9)
POTASSIUM SERPL-SCNC: 3.7 MMOL/L (ref 3.5–5.1)
PROT SERPL-MCNC: 6.7 G/DL (ref 6–8.4)
RBC # BLD AUTO: 4.74 M/UL (ref 4–5.4)
SODIUM SERPL-SCNC: 139 MMOL/L (ref 136–145)
TRIGL SERPL-MCNC: 44 MG/DL (ref 30–150)
TSH SERPL DL<=0.005 MIU/L-ACNC: 1.38 UIU/ML (ref 0.4–4)
WBC # BLD AUTO: 8.55 K/UL (ref 3.9–12.7)

## 2023-01-18 PROCEDURE — 36415 COLL VENOUS BLD VENIPUNCTURE: CPT | Mod: PO | Performed by: NURSE PRACTITIONER

## 2023-01-18 PROCEDURE — 84443 ASSAY THYROID STIM HORMONE: CPT | Performed by: NURSE PRACTITIONER

## 2023-01-18 PROCEDURE — 85025 COMPLETE CBC W/AUTO DIFF WBC: CPT | Performed by: NURSE PRACTITIONER

## 2023-01-18 PROCEDURE — 83036 HEMOGLOBIN GLYCOSYLATED A1C: CPT | Performed by: NURSE PRACTITIONER

## 2023-01-18 PROCEDURE — 80061 LIPID PANEL: CPT | Performed by: NURSE PRACTITIONER

## 2023-01-18 PROCEDURE — 80053 COMPREHEN METABOLIC PANEL: CPT | Performed by: NURSE PRACTITIONER

## 2023-02-13 LAB — CRC RECOMMENDATION EXT: NORMAL

## 2023-04-20 ENCOUNTER — PATIENT MESSAGE (OUTPATIENT)
Dept: FAMILY MEDICINE | Facility: CLINIC | Age: 33
End: 2023-04-20
Payer: COMMERCIAL

## 2023-04-20 DIAGNOSIS — R41.840 CONCENTRATION DEFICIT: Primary | ICD-10-CM

## 2023-06-14 DIAGNOSIS — K21.9 GASTROESOPHAGEAL REFLUX DISEASE, UNSPECIFIED WHETHER ESOPHAGITIS PRESENT: ICD-10-CM

## 2023-06-14 RX ORDER — OMEPRAZOLE 40 MG/1
CAPSULE, DELAYED RELEASE ORAL
Qty: 90 CAPSULE | Refills: 3 | Status: SHIPPED | OUTPATIENT
Start: 2023-06-14 | End: 2024-01-05 | Stop reason: SDUPTHER

## 2023-08-01 ENCOUNTER — OFFICE VISIT (OUTPATIENT)
Dept: PSYCHIATRY | Facility: CLINIC | Age: 33
End: 2023-08-01
Payer: COMMERCIAL

## 2023-08-01 VITALS
BODY MASS INDEX: 34.64 KG/M2 | WEIGHT: 188.25 LBS | SYSTOLIC BLOOD PRESSURE: 110 MMHG | HEART RATE: 94 BPM | DIASTOLIC BLOOD PRESSURE: 65 MMHG | HEIGHT: 62 IN

## 2023-08-01 DIAGNOSIS — F41.1 GENERALIZED ANXIETY DISORDER: ICD-10-CM

## 2023-08-01 PROCEDURE — 99999 PR PBB SHADOW E&M-EST. PATIENT-LVL III: CPT | Mod: PBBFAC,,, | Performed by: PSYCHOLOGIST

## 2023-08-01 PROCEDURE — 90792 PSYCH DIAG EVAL W/MED SRVCS: CPT | Mod: S$GLB,,, | Performed by: PSYCHOLOGIST

## 2023-08-01 PROCEDURE — 1159F PR MEDICATION LIST DOCUMENTED IN MEDICAL RECORD: ICD-10-PCS | Mod: CPTII,S$GLB,, | Performed by: PSYCHOLOGIST

## 2023-08-01 PROCEDURE — 3008F PR BODY MASS INDEX (BMI) DOCUMENTED: ICD-10-PCS | Mod: CPTII,S$GLB,, | Performed by: PSYCHOLOGIST

## 2023-08-01 PROCEDURE — 3044F HG A1C LEVEL LT 7.0%: CPT | Mod: CPTII,S$GLB,, | Performed by: PSYCHOLOGIST

## 2023-08-01 PROCEDURE — 3074F SYST BP LT 130 MM HG: CPT | Mod: CPTII,S$GLB,, | Performed by: PSYCHOLOGIST

## 2023-08-01 PROCEDURE — 90792 PR PSYCHIATRIC DIAGNOSTIC EVALUATION W/MEDICAL SERVICES: ICD-10-PCS | Mod: S$GLB,,, | Performed by: PSYCHOLOGIST

## 2023-08-01 PROCEDURE — 3008F BODY MASS INDEX DOCD: CPT | Mod: CPTII,S$GLB,, | Performed by: PSYCHOLOGIST

## 2023-08-01 PROCEDURE — 3074F PR MOST RECENT SYSTOLIC BLOOD PRESSURE < 130 MM HG: ICD-10-PCS | Mod: CPTII,S$GLB,, | Performed by: PSYCHOLOGIST

## 2023-08-01 PROCEDURE — 3078F DIAST BP <80 MM HG: CPT | Mod: CPTII,S$GLB,, | Performed by: PSYCHOLOGIST

## 2023-08-01 PROCEDURE — 3078F PR MOST RECENT DIASTOLIC BLOOD PRESSURE < 80 MM HG: ICD-10-PCS | Mod: CPTII,S$GLB,, | Performed by: PSYCHOLOGIST

## 2023-08-01 PROCEDURE — 99999 PR PBB SHADOW E&M-EST. PATIENT-LVL III: ICD-10-PCS | Mod: PBBFAC,,, | Performed by: PSYCHOLOGIST

## 2023-08-01 PROCEDURE — 3044F PR MOST RECENT HEMOGLOBIN A1C LEVEL <7.0%: ICD-10-PCS | Mod: CPTII,S$GLB,, | Performed by: PSYCHOLOGIST

## 2023-08-01 PROCEDURE — 1159F MED LIST DOCD IN RCRD: CPT | Mod: CPTII,S$GLB,, | Performed by: PSYCHOLOGIST

## 2023-08-01 RX ORDER — FLUOXETINE HYDROCHLORIDE 20 MG/1
20 CAPSULE ORAL DAILY
Qty: 30 CAPSULE | Refills: 2 | Status: SHIPPED | OUTPATIENT
Start: 2023-08-01 | End: 2023-10-30

## 2023-08-01 NOTE — LETTER
August 1, 2023        [place]  [address]  [facility] [address]             [facility]  [address] [facility] [facility] [address]  [facility] [address]  Phone: [phone]   Patient: Wendy Millan   MR Number: [id number]   YOB: 1990   Date of Visit: 8/1/2023       Dear Ms. Paddy:    Thank you for referring Wendy Millan to me for evaluation. Below are the relevant portions of my assessment and plan of care.    Pt is to start Prozac 20mg Q D and follow through on ADHD testing.     If you have questions, please do not hesitate to call me. I look forward to following Wendy along with you.    Sincerely,      Zen Garcia, PhD, MP           CC    No Recipients

## 2023-08-01 NOTE — PROGRESS NOTES
Outpatient Psychiatric Initial Visit  08/01/2023     ID:   Patient presents for an initial evaluation.      Reason for encounter: Referral from Krysten Thomason NP     Chief Complaint: MARY; ADHD evaluation    History of Presenting Illness:  Pt explained that she had a mostly happy unchildhood until her father went to war in [place] when she was 17. Pt said that before this she was happy and well supported. Pt's father came back with PTSD and her mother was very sick causing her to be a parentified parent. Pt struggled with some inattention in school but explained that she was bright and a perfectionist leading to inordinate effort to get good grades and well developed compensatory strategies.    Pt went to college and did well and then went to [employer] School. Pt said that she struggled in terms of her mental health but also with attention/concentration. Pt was evaluated and diagnosed with ADHD and started on Vyvanse. 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     Pt reported lifelong chronic worry. Pt described excessive, unproductive worry that is difficult to control. Pt explained that worrying about things outside of locust of control and worst case scenarios and described this worry as ruminative consistent with generalized anxiety disorder.    Pt also struggles with inattention and low productivity at work as a result. Pt would like to restart stimulant medication.    Depression symptoms: pt denied current or significant hx of past depression     Anxiety symptoms: Pt reported lifelong chronic worry. Pt described excessive, unproductive worry that is difficult to control. Pt explained that worrying about things outside of locust of control and worst case scenarios and described this worry as ruminative consistent with generalized anxiety disorder. Pt denied symptoms consistent with OCD, panic, phobias, or social anxiety.     Magui/Hypomania Symptoms: Pt denied  current or history of related symptoms.    Psychosis Symptoms: Pt denied current or history of related symptoms.    Attention/Concentration Symptoms: Pt reports attention/concentration concerns consistent with ADHD, predominantly inattentive type. Pt explained that her symptoms were present before the age of 14 and are cause impairment in more than one setting. Pt endorsed the following symptoms:    Inattentive:  Often fails to give close attention to details or makes careless mistakes in schoolwork, at work, or with other activities.  Often has trouble holding attention on tasks or play activities.  Often does not seem to listen when spoken to directly.  Often does not follow through on instructions and fails to finish schoolwork, chores, or duties in the workplace (e.g., loses focus, side-tracked).  Often has trouble organizing tasks and activities.  Often avoids, dislikes, or is reluctant to do tasks that require mental effort over a long period of time (such as schoolwork or homework).  Is often easily distracted  Is often forgetful in daily activities.  Hyperactive  Often fidgets with or taps hands or feet, or squirms in seat.  Often talks excessively.  Often blurts out an answer before a question has been completed.  Often has trouble waiting their turn.  Often interrupts or intrudes on others (e.g., butts into conversations or games)    Disordered Eating/Body Image Concerns: Pt denied current or history of related symptoms.    Suicidal Ideation and Risk: Pt denied current or history of related symptoms.    Homicidal/Violent Ideation and Risk: Pt denied current or history of related symptoms.    Criminal History: Pt denied.    Prior Psychiatric Treatment/Hospitalizations: Pt denied.     Current psychiatric medication: Atarax 10mg TID PRN    Prior psychiatric medication trials: Vyvanse 70mg     Current Medical Conditions Per Chart Review:   Patient Active Problem List   Diagnosis    S/P laparoscopic sleeve  gastrectomy    Gastroesophageal reflux disease    History of nephrolithiasis    Ureteral stone with hydronephrosis      Family Psychiatric History:  mother - anxiety    Alcohol Use: Pt reported minimal, infrequent alcohol use and denied a history of problematic drinking.    Tobacco and Drug Use: Pt denied tobacco or drug use.     Social History:  Pt is single and living at home. 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 Pt does not exercise but eats well. Pt drinks minimally and infrequently and denied cigarette and drug use.     Trauma history:  pt denied     Mental Status Exam      Physical Exam  Psychiatric:         Attention and Perception: Attention normal.         Mood and Affect: Mood is anxious.         Speech: Speech normal.         Behavior: Behavior normal. Behavior is cooperative.         Thought Content: Thought content normal. Thought content is not paranoid or delusional. Thought content does not include homicidal or suicidal ideation. Thought content does not include homicidal or suicidal plan.         Cognition and Memory: Cognition and memory normal.         Judgment: Judgment normal.      Comments: General appearance:  casually groomed, casually dressed    Behavior:  calm, engaged    Demeanor:  pleasant, cooperative    Mood:  anxious   Affect:  nervous  Speech:  regular rate, tone and volume    Thought Process:  linear and goal directed    Thought Content:  appropriate - absent of aggressive or self injurious thoughts, feelings or impulses    Insight into Current Situation:  fair    Judgement: fair   Expected Ability to Adhere to Treatment plan: good        Current Evaluation:  Nutritional Screening:  Considering the patient's height and weight, medications, medical history and preferences, should a referral be made to the dietitian? No  Vitals: most recent vitals signs, dated greater than 90 days prior to this appointment, were reviewed  General: age appropriate, well nourished, casually  dressed, neatly groomed  MSK: muscle strength/tone : no tremor or abnormal movements. Gait/Station: no ataxic, steady    Clinical Assessment :     Pt reported lifelong chronic worry. Pt described excessive, unproductive worry that is difficult to control. Pt explained that worrying about things outside of locust of control and worst case scenarios and described this worry as ruminative consistent with generalized anxiety disorder. Pt also endorses symptoms consistent with ADHD. Will start SSRI treatment and refer to ADHD testing to confirm a diagnosis.     Diagnosis(es):   1) Generalized Anxiety Disorder    Plan      Goal #1: Improve mood  Goal #2: Improve attention/concentration    Pt is to start Prozac 20mg Q D and follow through on ADHD testing.     Treatment plan and medication changes will be coordinated with PCP through Krysten Thomason NP    This author reviewed limits to confidentiality and this author's collaboration with pt's physician. Pt indicated understanding and denied any questions.    Return to Clinic: after ADHD testing    -Call to report any worsening of symptoms or problems associated with medication  - Pt instructed to go to ER if thoughts of harming self or others arise   Spent 45 minutes face-to-face with patient during evaluation.    -Supportive therapy and psychoeducation provided  -R/B/SE's of medications discussed with the pt who expresses understanding and chooses to take medications as prescribed.   -Pt instructed to call clinic, 911 or go to nearest emergency room if sxs worsen or pt is in   crisis. The pt expresses understanding.    Antidepressant/Antianxiety Medication Initiation:  Patient informed of risks, benefits, and potential side effects of medication and accepts informed consent.  Common side effects include nausea, fatigue, headache, insomnia., Specifically discussed the possibility of new or worsening suicidal thoughts/depression.  Patient instructed to stop the medication  immediately and seek urgent treatment if this occurs. Patient instructed not to abruptly discontinue medication without physician guidance except in cases of sudden onset or worsening of SI.        Pregnancy Warning:  Patient denies current pregnancy possibility.  Patient made aware that medications have not been proven safe in pregnancy and that she must maintain adequate birth control.  Patient instructed to alert us immediately if she becomes pregnant.

## 2023-08-29 ENCOUNTER — PATIENT MESSAGE (OUTPATIENT)
Dept: PSYCHIATRY | Facility: CLINIC | Age: 33
End: 2023-08-29
Payer: COMMERCIAL

## 2023-09-19 ENCOUNTER — OFFICE VISIT (OUTPATIENT)
Dept: PSYCHIATRY | Facility: CLINIC | Age: 33
End: 2023-09-19
Payer: COMMERCIAL

## 2023-09-19 DIAGNOSIS — F41.1 GENERALIZED ANXIETY DISORDER: Primary | ICD-10-CM

## 2023-09-19 DIAGNOSIS — F90.2 ADHD (ATTENTION DEFICIT HYPERACTIVITY DISORDER), COMBINED TYPE: ICD-10-CM

## 2023-09-19 PROCEDURE — 99499 NO LOS: ICD-10-PCS | Mod: 95,,, | Performed by: PSYCHOLOGIST

## 2023-09-19 PROCEDURE — 99499 UNLISTED E&M SERVICE: CPT | Mod: 95,,, | Performed by: PSYCHOLOGIST

## 2023-09-19 RX ORDER — LISDEXAMFETAMINE DIMESYLATE 30 MG/1
30 CAPSULE ORAL EVERY MORNING
Qty: 30 CAPSULE | Refills: 0 | Status: SHIPPED | OUTPATIENT
Start: 2023-09-19 | End: 2023-11-02 | Stop reason: SDUPTHER

## 2023-09-19 NOTE — PROGRESS NOTES
The patient location is: [place], [place]  The chief complaint leading to consultation is: ADHD evaluation  Visit type: Virtual visit with synchronous audio and video  Each patient to whom he or she provides medical services by telemedicine is:  (1) informed of the relationship between the physician and patient and the respective role of any other health care provider with respect to management of the patient; and (2) notified that he or she may decline to receive medical services by telemedicine and may withdraw from such care at any time.  Face-to-Face time: 21 minutes    Notes:      Outpatient Psychological Consultation    Name: Wendy Millan  MRN: [id number]  : 1990    Testing appointment: 2023    ID:  Patient presents for consultation for diagnostic clarity in regard to difficulties with attention/concentration    Reason for encounter Referral from this author     Psychiatric records were reviewed prior to the diagnostic interview. Comprehensive psychological assessment will not be documented in this report rather will be focused on the referral question. See prior notes for comprehensive psychiatric work-up.    Chief Complaint: Pt is a 32 year old female presenting as a referral from this author for diagnostic clarification due to report of difficulty concentration/poor attention    Psychological Assessments Administered  Wender [place] Rating Scale for the Attention Deficit Hyperactivity Disorder  Anatoly Adult ADHD Rating Scales-IV: Other-Report, current symptoms  Anatoly Adult ADHD Rating Scales-IV: Other-Report, childhood symptoms  Shaniqua Continuous Performance Test 3  The Dot Counting Test    Results    Wender [place] Rating Scale for the Attention Deficit Hyperactivity Disorder  The Wender [place] Rating Scale can be used to assess adults for Attention Deficit Hyperactivity Disorder with a subset of 25 questions associated with that diagnosis. It is a retrospective diagnosis of childhood  ADHD.      Wender [place] Rating Scale Subscore = 48 (sum of the 25 questions endorsed that are associated with ADHD)    Scores vary from 1-100, and the cutoff score is 46.    Given pt's report of their own symptoms of ADHD in childhood, their response style does support a diagnosis of ADHD.    Anatoly Adult ADHD Rating Scales-IV: Other-Report, current symptoms  The BAARS-IV: Other-Report, current symptoms is a collateral measure of the patient's current symptoms of ADHD, as noted by someone with knowledge of the patient's executive functioning.    Julianne Escobar is the evaluator whose relationship to pt is her co-worker.     Inattention total score: 34      Hyperactivity total score: 9      Impulsivity total score: 14      Sluggish Cognitive Tempo total score: 25    ADHD total score (sum of Inattention/Hyperactivity/Impulsivity Subsections): 57       Inattention Symptom Count: 8     Hyperactivity/Impulsivity Symptom Count: 5   ADHD Symptom Count total (sum of Inattention/Hyperactivity/Impulsivity Subsections): 13     Based on the evaluator's report of pt's symptoms, pt does often struggle with 8 symptoms of inattention but only 5 symptoms of hyperactivity/impulsivity. This supports a diagnosis of ADHD - predominately inattentive type    Anatoly Adult ADHD Rating Scales-IV: Other-Report, childhood symptoms  The BAARS-IV: Other-Report, childhood symptoms is a collateral measure of the patient's symptoms of ADHD between the ages of 5-12 years old, as reported by someone with knowledge of the patient's symptoms during childhood.    Yi Monty is the evaluator whose relationship to pt is her mother.     Inattention total score: 28   Hyperactivity/Impulsivity total score: 20     Inattention Symptom Count: 8   Hyperactivity/Impulsivity Symptom Count: 3     Based on the evaluator's report of symptoms, pt did exhibit sufficient symptoms of inattention, hyperactivity, and impulsivity that affected multiple settings at an early  age to meet the DSM-5 diagnosis criteria for an ADHD diagnosis.     Shaniqua Continuous Performance Test 3  The Shaniqua Continuous Performance Test 3rd Edition (Shaniqua CPT 3) is an objective, task-oriented computerized assessment of attention-related problems. This measure creates an index of the respondent's performance in areas of inattentiveness, impulsivity, sustained attention, and vigilance.    Pt's performance on this objective measure does indicate difficulties hyperactivity, impulsivity, and difficulty maintaining vigilance with varying levels of stimulus frequency but oddly no difficulty with sustaining attention.    The Dot Counting Test  The Dot Counting Test (DCT) is a brief task that assesses test-taking effort in individuals.     Based on patient performance and score, pt's effort is suspect    Interpretation    Pt is a 32 year old female presenting as a referral from this author for diagnostic clarification due to report of difficulty concentration/poor attention. Pt reports attention/concentration concerns consistent with ADHD, combined type. Pt explained that her symptoms were present before the age of 14 and cause impairment in more than one setting.      Diagnosis     Attention-Deficit / Hyperactivity Disorder, combined presentation    Plan and Recommendations    Pt will start Vyvanse 30mg Q AM. Pt will continue Prozac 20mg Q D and Atarax 10mg TID PRN.    Attention Tips Remember that inattention and lack of focus are major culprits to forgetting information so be sure and practice paying attention for adequate learning of information. If you rely on passive attention to remembering something (e.g., lonnie, justus-huh approach), you'll find you cannot recall it later. I recommend the following to improve attention, which may aid in later recall:   Reduce distractions as much as possible.  Look at the person as they are speaking to you.   Paraphrase as they are speaking  Write down important pieces of  information   Ask people to repeat if you zone out.    Have visual cues  (posted to-do-list, daily schedule) to remind you if you need to do something later.   Processing Speed Tips Using multiple modalities (e.g., listening, writing notes, asking questions, recording) to learn new information is likely to allow additional time for processing, thus improving memory for the material.   Allowing sufficient time to complete tasks will reduce frustration and help to ensure completion.  Spend a lot of up-front time planning in advance how long a task may take and then chunk steps in the task so you don't wear yourself out.   Executive Functioning Tips: Don't attempt to multi-task.  Separate tasks so that each can be completed one at a time.  Consider using a calendar/day planner, as that may be effective to help you plan and stay on track.  Color-coding specific tasks by importance may add additional benefit to your planner.  Break down large projects into smaller tasks and write down the steps to completing the task.      BILLIN, 96139 X2 (90 minutes of testing and scoring with psychometrist), [id number], [id number] (2 hours of report writing, evaluation and feedback)     Stimulant Medication Initiation:  Patient advised of risks, benefits, and side effects of medication and accepts informed consent.  Common side effects include insomnia, irritability, jittery feeling, dry mouth, and agitation/hostility., Patient advised of potential addictive nature of medication and controlled substance classification.  Instructed to safeguard medication as no early refills can be given for lost or stolen medications.       Pregnancy Warning:  Patient denies current pregnancy possibility.  Patient made aware that medications have not been proven safe in pregnancy and that she must maintain adequate birth control.  Patient instructed to alert us immediately if she becomes pregnant.

## 2023-10-15 ENCOUNTER — PATIENT MESSAGE (OUTPATIENT)
Dept: FAMILY MEDICINE | Facility: CLINIC | Age: 33
End: 2023-10-15
Payer: COMMERCIAL

## 2023-10-16 ENCOUNTER — PATIENT MESSAGE (OUTPATIENT)
Dept: FAMILY MEDICINE | Facility: CLINIC | Age: 33
End: 2023-10-16
Payer: COMMERCIAL

## 2023-10-16 RX ORDER — PRAMOXINE HYDROCHLORIDE HYDROCORTISONE ACETATE 100; 100 MG/10G; MG/10G
1 AEROSOL, FOAM TOPICAL 2 TIMES DAILY
Qty: 10 G | Refills: 0 | Status: SHIPPED | OUTPATIENT
Start: 2023-10-16 | End: 2023-10-18

## 2023-10-16 RX ORDER — NYSTATIN 100000 [USP'U]/ML
4 SUSPENSION ORAL 4 TIMES DAILY
Qty: 160 ML | Refills: 0 | Status: SHIPPED | OUTPATIENT
Start: 2023-10-16 | End: 2023-10-26

## 2023-10-16 RX ORDER — HYDROCORTISONE ACETATE PRAMOXINE HCL 1; 1 G/100G; G/100G
CREAM TOPICAL 3 TIMES DAILY
Qty: 30 G | Refills: 1 | Status: SHIPPED | OUTPATIENT
Start: 2023-10-16 | End: 2023-10-16

## 2023-10-17 ENCOUNTER — E-VISIT (OUTPATIENT)
Dept: FAMILY MEDICINE | Facility: CLINIC | Age: 33
End: 2023-10-17
Payer: COMMERCIAL

## 2023-10-17 ENCOUNTER — PATIENT MESSAGE (OUTPATIENT)
Dept: FAMILY MEDICINE | Facility: CLINIC | Age: 33
End: 2023-10-17

## 2023-10-17 DIAGNOSIS — B37.9 YEAST INFECTION: Primary | ICD-10-CM

## 2023-10-17 PROCEDURE — 99421 PR E&M, ONLINE DIGIT, EST, < 7 DAYS, 5-10 MINS: ICD-10-PCS | Mod: ,,, | Performed by: NURSE PRACTITIONER

## 2023-10-17 PROCEDURE — 99421 OL DIG E/M SVC 5-10 MIN: CPT | Mod: ,,, | Performed by: NURSE PRACTITIONER

## 2023-10-17 RX ORDER — FLUCONAZOLE 150 MG/1
150 TABLET ORAL ONCE
Qty: 2 TABLET | Refills: 0 | Status: SHIPPED | OUTPATIENT
Start: 2023-10-17 | End: 2023-10-17

## 2023-10-17 NOTE — PROGRESS NOTES
Patient ID: Wendy Millan is a 33 y.o. female.    Chief Complaint: Vaginal Discharge (Entered automatically based on patient selection in Patient Portal.)    The patient initiated a request through [employer] on 10/17/2023 for evaluation and management with a chief complaint of Vaginal Discharge (Entered automatically based on patient selection in Patient Portal.)     I evaluated the questionnaire submission on 10/17/23.    Ohs Peq Evisit Vaginal Discharge    10/17/2023 11:56 AM CDT - Filed by Patient   Do you agree to participate in an E-Visit? Yes   If you have any of the following symptoms,  please do not complete an E-Visit,  schedule an appointment with your provider: I acknowledge   What is the main issue that you would like for your doctor to address today? Yeast infection   Are you able to take your vital signs? No   Are you currently pregnant, could you be pregnant, or are you breast feeding? None of the above   Which of the following are you experiencing? Vaginal Itching;  Vaginal discharge    Are you having pain while passing urine? No, I have no pain while urinating.   Which of the following applies to your vaginal discharge? I have a white/milky discharge.    Which of the following are you experiencing? None of the above   Do you have any sores on your genitals? No    Have you taken antibiotics recently? Yes, I recently took some   Please enter when you took antibiotics, and the name of the medicine, if you know it. Amoxicillin finished 2 days ago    Do you use any of the following? None of the above   Which of the following applies to your menstrual period? I expect to have a menstrual period soon.   Have you had similar symptoms in the past? Yes, I have had similar symptoms once before.   When you had similar symptoms in the past, did any of the following work? Pills for yeast infection   Have you had a fever? No   During the last 2 months, have you had sexual contact with a specific person for  the first time? No   Provide any information you feel is important to your history not asked above    Please attach any relevant images or files          Recent Labs Obtained:  No visits with results within 7 Day(s) from this visit.   Latest known visit with results is:   Admission on 2023, Discharged on 2023   Component Date Value Ref Range Status    POC Preg Test, Ur 2023 Negative  Negative Final     Acceptable 2023 Yes   Final       Encounter Diagnosis   Name Primary?    Yeast infection Yes        No orders of the defined types were placed in this encounter.     Medications Ordered This Encounter   Medications    fluconazole (DIFLUCAN) 150 MG Tab     Sig: Take 1 tablet (150 mg total) by mouth once. May repeat in one week if needed for 1 dose     Dispense:  2 tablet     Refill:  0        F/u prn       E-Visit Time Trackin mins

## 2023-10-18 RX ORDER — HYDROCORTISONE ACETATE 25 MG/1
25 SUPPOSITORY RECTAL 2 TIMES DAILY
Qty: 20 SUPPOSITORY | Refills: 0 | Status: SHIPPED | OUTPATIENT
Start: 2023-10-18 | End: 2023-10-28

## 2023-10-27 NOTE — TELEPHONE ENCOUNTER
Last ordered: 2 months ago (8/1/2023) by Zen Garcia, PhD, MP     Last refill: 8/1/2023   Lov 9/19/23   Nov none   Patient was due back on 10/17/23

## 2023-10-30 RX ORDER — FLUOXETINE HYDROCHLORIDE 20 MG/1
20 CAPSULE ORAL
Qty: 30 CAPSULE | Refills: 0 | Status: SHIPPED | OUTPATIENT
Start: 2023-10-30 | End: 2023-11-21

## 2023-11-03 RX ORDER — LISDEXAMFETAMINE DIMESYLATE 30 MG/1
30 CAPSULE ORAL EVERY MORNING
Qty: 30 CAPSULE | Refills: 0 | Status: SHIPPED | OUTPATIENT
Start: 2023-11-03 | End: 2023-11-24 | Stop reason: SDUPTHER

## 2023-11-21 RX ORDER — FLUOXETINE HYDROCHLORIDE 20 MG/1
20 CAPSULE ORAL
Qty: 90 CAPSULE | Refills: 0 | Status: SHIPPED | OUTPATIENT
Start: 2023-11-21 | End: 2023-11-24 | Stop reason: SDUPTHER

## 2023-11-22 ENCOUNTER — PATIENT MESSAGE (OUTPATIENT)
Dept: PSYCHIATRY | Facility: CLINIC | Age: 33
End: 2023-11-22
Payer: COMMERCIAL

## 2023-11-24 ENCOUNTER — OFFICE VISIT (OUTPATIENT)
Dept: PSYCHIATRY | Facility: CLINIC | Age: 33
End: 2023-11-24
Payer: COMMERCIAL

## 2023-11-24 DIAGNOSIS — F41.1 GENERALIZED ANXIETY DISORDER: ICD-10-CM

## 2023-11-24 DIAGNOSIS — F90.2 ADHD (ATTENTION DEFICIT HYPERACTIVITY DISORDER), COMBINED TYPE: Primary | ICD-10-CM

## 2023-11-24 PROCEDURE — 3044F PR MOST RECENT HEMOGLOBIN A1C LEVEL <7.0%: ICD-10-PCS | Mod: CPTII,95,, | Performed by: PSYCHOLOGIST

## 2023-11-24 PROCEDURE — 1159F PR MEDICATION LIST DOCUMENTED IN MEDICAL RECORD: ICD-10-PCS | Mod: CPTII,95,, | Performed by: PSYCHOLOGIST

## 2023-11-24 PROCEDURE — 99214 OFFICE O/P EST MOD 30 MIN: CPT | Mod: 95,,, | Performed by: PSYCHOLOGIST

## 2023-11-24 PROCEDURE — 99214 PR OFFICE/OUTPT VISIT, EST, LEVL IV, 30-39 MIN: ICD-10-PCS | Mod: 95,,, | Performed by: PSYCHOLOGIST

## 2023-11-24 PROCEDURE — 1159F MED LIST DOCD IN RCRD: CPT | Mod: CPTII,95,, | Performed by: PSYCHOLOGIST

## 2023-11-24 PROCEDURE — 3044F HG A1C LEVEL LT 7.0%: CPT | Mod: CPTII,95,, | Performed by: PSYCHOLOGIST

## 2023-11-24 RX ORDER — FLUOXETINE HYDROCHLORIDE 20 MG/1
20 CAPSULE ORAL DAILY
Qty: 90 CAPSULE | Refills: 0 | Status: SHIPPED | OUTPATIENT
Start: 2023-11-24 | End: 2024-01-09 | Stop reason: SDUPTHER

## 2023-11-24 RX ORDER — LISDEXAMFETAMINE DIMESYLATE 30 MG/1
30 CAPSULE ORAL EVERY MORNING
Qty: 30 CAPSULE | Refills: 0 | Status: SHIPPED | OUTPATIENT
Start: 2023-11-24 | End: 2024-01-09 | Stop reason: SDUPTHER

## 2023-11-24 NOTE — PROGRESS NOTES
"The patient location is: [place], [place]  The chief complaint leading to consultation is: ADHD, anxiety  Visit type: Virtual visit with synchronous audio and video  Each patient to whom he or she provides medical services by telemedicine is:  (1) informed of the relationship between the physician and patient and the respective role of any other health care provider with respect to management of the patient; and (2) notified that he or she may decline to receive medical services by telemedicine and may withdraw from such care at any time.  Face-to-Face time: 11 minutes    Notes:      Outpatient Psychiatry Follow-Up Visit    Clinical Status of Patient: Outpatient (Ambulatory)  11/24/2023     Chief Complaint: 33 year old female presenting today for a follow-up.       Interval History and Content of Current Session:  Interim Events/Subjective Report/Content of Current Session:  follow-up appointment.    Pt is a 33 year old female with past psychiatric hx of ADHD and anxiety who presents for follow-up treatment. Pt finds that Vyvanse 30mg once daily is "night and day" better for her ability to focus and function. Pt denied any side effects. Pt feels Prozac is helpful and she feels more clear-minded in general. Pt denied any other major changes or new stressors.    Past Psychiatric hx: Vyvanse    Past Medical hx:   Past Medical History:   Diagnosis Date    Asthma     seasonal    GERD (gastroesophageal reflux disease)     Kidney stones     PONV (postoperative nausea and vomiting)     Spinal headache         Interim hx:  Medication changes last visit:   started Vyvanse 30mg Q AM  Anxiety: mild - improved  Depression: pt denied     Denies suicidal/homicidal ideations.  Denies hopelessness/worthlessness.    Denies auditory/visual hallucinations      Alcohol: minimal, infrequent  Drug: pt denied  Caffeine: minimal use  Tobacco: pt denied      Review of Systems   PSYCHIATRIC: Pertinent items are noted in the " narrative.        CONSTITUTIONAL: weight stable    Past Medical, Family and Social History: The patient's past medical, family and social history have been reviewed and updated as appropriate within the electronic medical record. See encounter notes.     Current Psychiatric Medication:  Vyvanse 30mg Q AM, Prozac 20mg Q D, and Atarax 10mg TID PRN     Compliance: yes      Side effects: pt denied     Risk Parameters:  Patient reports no suicidal ideation  Patient reports no homicidal ideation  Patient reports no self-injurious behavior  Patient reports no violent behavior     Exam (detailed: at least 9 elements; comprehensive: all 15 elements)   Constitutional  Vitals:  Most recent vital signs, dated less than 90 days prior to this appointment, were reviewed.        General:  unremarkable, age appropriate, casual attire, good eye contact, good rapport       Musculoskeletal  Muscle Strength/Tone:  no flaccidity, no tremor    Gait & Station:  normal      Psychiatric                       Speech:  normal tone, normal rate, rhythm, and volume   Mood & Affect:   Depressed, anxious         Thought Process:   Goal directed; Linear    Associations:   intact   Thought Content:   No SI/HI, delusions, or paranoia, no AV/VH   Insight & Judgement:   Good, adequate to circumstances   Orientation:   grossly intact; alert and oriented x 4    Memory:  intact for content of interview    Language:  grossly intact, can repeat    Attention Span  : Grossly intact for content of interview   Fund of Knowledge:   intact and appropriate to age and level of education        Assessment and Diagnosis   Status/Progress: improving     Impression:Pt reported lifelong chronic worry. Pt described excessive, unproductive worry that is difficult to control. Pt explained that worrying about things outside of locust of control and worst case scenarios and described this worry as ruminative consistent with generalized anxiety disorder. Pt also endorses  symptoms consistent with ADHD and after testing a diagnosis is confirmed. Pt should continue in psychiatric medication management.    Diagnosis(es):   1) Generalized Anxiety Disorder  2) ADHD, combined type    Intervention/Counseling/Treatment Plan   Medication Management:      1. Continue Vyvanse 30mg Q AM, Prozac 20mg Q D, and Atarax 10mg TID PRN     2. Call to report any worsening of symptoms or problems with the medication. Pt instructed to go to ER with thoughts of harming self, others    Psychotherapy:   Target symptoms: anxiety, attention/concentration  Why chosen therapy is appropriate versus another modality: CBT used; relevant to diagnosis, patient responds to this modality  Outcome monitoring methods: self-report, observation  Therapeutic intervention type: Cognitive Behavioral Therapy  Topics discussed/themes: building skills sets for symptom management, symptom recognition, nutrition, exercise  The patient's response to the intervention is good  Patient's response to treatment is: good.   The patient's progress toward treatment goals: improving     Return to clinic: 3 months    -Cognitive-Behavioral/Supportive therapy and psychoeducation provided  -R/B/SE's of medications discussed with the pt who expresses understanding and chooses to take medications as prescribed.   -Pt instructed to call clinic, 911 or go to nearest emergency room if sxs worsen or pt is in   crisis. The pt expresses understanding.    Zen Garcia, PhD, MP

## 2023-12-04 RX ORDER — FLUTICASONE PROPIONATE 50 MCG
2 SPRAY, SUSPENSION (ML) NASAL DAILY
Qty: 9.9 ML | Refills: 3 | Status: SHIPPED | OUTPATIENT
Start: 2023-12-04 | End: 2024-01-05 | Stop reason: SDUPTHER

## 2023-12-05 ENCOUNTER — PATIENT MESSAGE (OUTPATIENT)
Dept: PSYCHIATRY | Facility: CLINIC | Age: 33
End: 2023-12-05
Payer: COMMERCIAL

## 2024-01-05 ENCOUNTER — OFFICE VISIT (OUTPATIENT)
Dept: FAMILY MEDICINE | Facility: CLINIC | Age: 34
End: 2024-01-05
Payer: COMMERCIAL

## 2024-01-05 VITALS
WEIGHT: 184.06 LBS | SYSTOLIC BLOOD PRESSURE: 110 MMHG | HEIGHT: 62 IN | DIASTOLIC BLOOD PRESSURE: 88 MMHG | BODY MASS INDEX: 33.87 KG/M2

## 2024-01-05 DIAGNOSIS — R53.83 FATIGUE, UNSPECIFIED TYPE: ICD-10-CM

## 2024-01-05 DIAGNOSIS — E61.1 IRON DEFICIENCY: ICD-10-CM

## 2024-01-05 DIAGNOSIS — Z00.00 LABORATORY EXAM ORDERED AS PART OF ROUTINE GENERAL MEDICAL EXAMINATION: ICD-10-CM

## 2024-01-05 DIAGNOSIS — Z00.00 ROUTINE PHYSICAL EXAMINATION: Primary | ICD-10-CM

## 2024-01-05 DIAGNOSIS — K21.9 GASTROESOPHAGEAL REFLUX DISEASE, UNSPECIFIED WHETHER ESOPHAGITIS PRESENT: ICD-10-CM

## 2024-01-05 PROCEDURE — 3008F BODY MASS INDEX DOCD: CPT | Mod: CPTII,S$GLB,, | Performed by: NURSE PRACTITIONER

## 2024-01-05 PROCEDURE — 3074F SYST BP LT 130 MM HG: CPT | Mod: CPTII,S$GLB,, | Performed by: NURSE PRACTITIONER

## 2024-01-05 PROCEDURE — 3079F DIAST BP 80-89 MM HG: CPT | Mod: CPTII,S$GLB,, | Performed by: NURSE PRACTITIONER

## 2024-01-05 PROCEDURE — 99395 PREV VISIT EST AGE 18-39: CPT | Mod: S$GLB,,, | Performed by: NURSE PRACTITIONER

## 2024-01-05 RX ORDER — OMEPRAZOLE 40 MG/1
40 CAPSULE, DELAYED RELEASE ORAL DAILY
Qty: 90 CAPSULE | Refills: 3 | Status: SHIPPED | OUTPATIENT
Start: 2024-01-05

## 2024-01-05 RX ORDER — ALBUTEROL SULFATE 90 UG/1
2 AEROSOL, METERED RESPIRATORY (INHALATION) EVERY 6 HOURS PRN
Qty: 18 G | Refills: 1 | Status: SHIPPED | OUTPATIENT
Start: 2024-01-05 | End: 2025-01-04

## 2024-01-05 RX ORDER — FLUTICASONE PROPIONATE 50 MCG
2 SPRAY, SUSPENSION (ML) NASAL DAILY
Qty: 9.9 ML | Refills: 4 | Status: SHIPPED | OUTPATIENT
Start: 2024-01-05 | End: 2024-02-14 | Stop reason: SDUPTHER

## 2024-01-05 NOTE — PROGRESS NOTES
"Subjective:       Patient ID: Wendy Millan is a 33 y.o. female.    Chief Complaint: No chief complaint on file.  The patient is here for routine physical. Patient is generally feeling well without any complaints today.  Patient has GERD, needs refill on Prilosec.  Patient does need a refill on Flonase.  She does have significant allergies/Eustachian tube dysfunction.  Recently started  Immunology via [facility]     [facility] and got Semaglutide, she has not started this yet.    Patient does have a history of an iron deficiency.  Two years ago her ferritin was 7.  She was intolerant to oral iron secondary to GI upset.  Of note the patient did have a gastric sleeve in 2017.    She also has anxiety/ADHD.  She is followed by Psychiatry.  She was started on Vyvanse and fluoxetine and uses hydroxyzine p.r.n. anxiety.  She feels like she is doing well on all these medications.    HPI  Review of Systems   Constitutional:  Positive for fatigue. Negative for activity change and unexpected weight change.   HENT:  Negative for hearing loss, rhinorrhea and trouble swallowing.    Eyes:  Negative for discharge and visual disturbance.   Respiratory:  Negative for chest tightness and wheezing.    Cardiovascular:  Negative for chest pain and palpitations.   Gastrointestinal:  Negative for blood in stool, constipation, diarrhea and vomiting.   Endocrine: Negative for polydipsia and polyuria.   Genitourinary:  Negative for difficulty urinating, dysuria, hematuria and menstrual problem.   Musculoskeletal:  Negative for arthralgias, joint swelling and neck pain.   Neurological:  Negative for weakness and headaches.   Psychiatric/Behavioral:  Negative for confusion and dysphoric mood.        Past medical, surgical, family and social history reviewed.  Objective:     Vitals:    01/05/24 1555   BP: 110/88   Weight: 83.5 kg (184 lb 1.4 oz)   Height: 5' 2" (1.575 m)   PainSc: 0-No pain     Body mass index is 33.67 " kg/m².     Physical Exam  Constitutional:       General: She is not in acute distress.     Appearance: She is well-developed. She is not diaphoretic.   HENT:      Head: Normocephalic and atraumatic.      Right Ear: Hearing, tympanic membrane, ear canal and external ear normal.      Left Ear: Hearing, tympanic membrane, ear canal and external ear normal.      Nose: Nose normal.      Mouth/Throat:      Pharynx: Uvula midline.   Eyes:      General:         Right eye: No discharge.         Left eye: No discharge.      Conjunctiva/sclera: Conjunctivae normal.      Pupils: Pupils are equal, round, and reactive to light.   Neck:      Thyroid: No thyromegaly.      Vascular: No carotid bruit or JVD.      Trachea: Trachea normal.   Cardiovascular:      Rate and Rhythm: Normal rate and regular rhythm.      Heart sounds: No murmur heard.     No friction rub. No gallop.   Pulmonary:      Effort: Pulmonary effort is normal. No respiratory distress.      Breath sounds: Normal breath sounds. No wheezing or rales.   Chest:      Chest wall: No tenderness.   Abdominal:      General: Bowel sounds are normal. There is no distension.      Palpations: Abdomen is soft. There is no mass.      Tenderness: There is no abdominal tenderness. There is no guarding or rebound.   Musculoskeletal:         General: Normal range of motion.      Cervical back: Normal range of motion and neck supple.   Skin:     General: Skin is warm and dry.   Neurological:      Mental Status: She is alert and oriented to person, place, and time.      Coordination: Coordination normal.   Psychiatric:         Behavior: Behavior normal.         Thought Content: Thought content normal.         Judgment: Judgment normal.         Assessment:       1. Routine physical examination    2. Gastroesophageal reflux disease, unspecified whether esophagitis present    3. Iron deficiency    4. Laboratory exam ordered as part of routine general medical examination    5. Fatigue,  unspecified type        Plan:       Diagnoses and all orders for this visit:    Routine physical examination    Gastroesophageal reflux disease, unspecified whether esophagitis present  -     omeprazole (PRILOSEC) 40 MG capsule; Take 1 capsule (40 mg total) by mouth once daily.    Iron deficiency  -     Iron and TIBC; Future  -     Ferritin; Future    Laboratory exam ordered as part of routine general medical examination  -     CBC Auto Differential; Future  -     Comprehensive Metabolic Panel; Future  -     Hemoglobin A1C; Future  -     Lipid Panel; Future  -     TSH; Future    Fatigue, unspecified type  -     Vitamin B12; Future    Other orders  -     albuterol (PROVENTIL/VENTOLIN HFA) 90 mcg/actuation inhaler; Inhale 2 puffs into the lungs every 6 (six) hours as needed for Wheezing. Rescue  -     fluticasone propionate (FLONASE) 50 mcg/actuation nasal spray; 2 sprays (100 mcg total) by Each Nostril route once daily.

## 2024-01-09 ENCOUNTER — PATIENT MESSAGE (OUTPATIENT)
Dept: FAMILY MEDICINE | Facility: CLINIC | Age: 34
End: 2024-01-09
Payer: COMMERCIAL

## 2024-01-09 ENCOUNTER — LAB VISIT (OUTPATIENT)
Dept: LAB | Facility: HOSPITAL | Age: 34
End: 2024-01-09
Attending: NURSE PRACTITIONER
Payer: COMMERCIAL

## 2024-01-09 DIAGNOSIS — Z00.00 LABORATORY EXAM ORDERED AS PART OF ROUTINE GENERAL MEDICAL EXAMINATION: ICD-10-CM

## 2024-01-09 DIAGNOSIS — R53.83 FATIGUE, UNSPECIFIED TYPE: ICD-10-CM

## 2024-01-09 DIAGNOSIS — E61.1 IRON DEFICIENCY: ICD-10-CM

## 2024-01-09 LAB
ALBUMIN SERPL BCP-MCNC: 4.1 G/DL (ref 3.5–5.2)
ALP SERPL-CCNC: 61 U/L (ref 55–135)
ALT SERPL W/O P-5'-P-CCNC: 12 U/L (ref 10–44)
ANION GAP SERPL CALC-SCNC: 8 MMOL/L (ref 8–16)
AST SERPL-CCNC: 14 U/L (ref 10–40)
BASOPHILS # BLD AUTO: 0.04 K/UL (ref 0–0.2)
BASOPHILS NFR BLD: 0.7 % (ref 0–1.9)
BILIRUB SERPL-MCNC: 0.4 MG/DL (ref 0.1–1)
BUN SERPL-MCNC: 12 MG/DL (ref 6–20)
CALCIUM SERPL-MCNC: 9.4 MG/DL (ref 8.7–10.5)
CHLORIDE SERPL-SCNC: 107 MMOL/L (ref 95–110)
CHOLEST SERPL-MCNC: 162 MG/DL (ref 120–199)
CHOLEST/HDLC SERPL: 2.3 {RATIO} (ref 2–5)
CO2 SERPL-SCNC: 25 MMOL/L (ref 23–29)
CREAT SERPL-MCNC: 0.8 MG/DL (ref 0.5–1.4)
DIFFERENTIAL METHOD BLD: ABNORMAL
EOSINOPHIL # BLD AUTO: 0.1 K/UL (ref 0–0.5)
EOSINOPHIL NFR BLD: 1.5 % (ref 0–8)
ERYTHROCYTE [DISTWIDTH] IN BLOOD BY AUTOMATED COUNT: 16.6 % (ref 11.5–14.5)
EST. GFR  (NO RACE VARIABLE): >60 ML/MIN/1.73 M^2
ESTIMATED AVG GLUCOSE: 100 MG/DL (ref 68–131)
FERRITIN SERPL-MCNC: 3 NG/ML (ref 20–300)
GLUCOSE SERPL-MCNC: 85 MG/DL (ref 70–110)
HBA1C MFR BLD: 5.1 % (ref 4–5.6)
HCT VFR BLD AUTO: 35.6 % (ref 37–48.5)
HDLC SERPL-MCNC: 71 MG/DL (ref 40–75)
HDLC SERPL: 43.8 % (ref 20–50)
HGB BLD-MCNC: 10.6 G/DL (ref 12–16)
IMM GRANULOCYTES # BLD AUTO: 0.01 K/UL (ref 0–0.04)
IMM GRANULOCYTES NFR BLD AUTO: 0.2 % (ref 0–0.5)
IRON SERPL-MCNC: 21 UG/DL (ref 30–160)
LDLC SERPL CALC-MCNC: 84 MG/DL (ref 63–159)
LYMPHOCYTES # BLD AUTO: 1.3 K/UL (ref 1–4.8)
LYMPHOCYTES NFR BLD: 24.9 % (ref 18–48)
MCH RBC QN AUTO: 21.4 PG (ref 27–31)
MCHC RBC AUTO-ENTMCNC: 29.8 G/DL (ref 32–36)
MCV RBC AUTO: 72 FL (ref 82–98)
MONOCYTES # BLD AUTO: 0.5 K/UL (ref 0.3–1)
MONOCYTES NFR BLD: 8.7 % (ref 4–15)
NEUTROPHILS # BLD AUTO: 3.5 K/UL (ref 1.8–7.7)
NEUTROPHILS NFR BLD: 64 % (ref 38–73)
NONHDLC SERPL-MCNC: 91 MG/DL
NRBC BLD-RTO: 0 /100 WBC
PLATELET # BLD AUTO: 438 K/UL (ref 150–450)
PMV BLD AUTO: 10.7 FL (ref 9.2–12.9)
POTASSIUM SERPL-SCNC: 4.7 MMOL/L (ref 3.5–5.1)
PROT SERPL-MCNC: 7.4 G/DL (ref 6–8.4)
RBC # BLD AUTO: 4.96 M/UL (ref 4–5.4)
SATURATED IRON: 4 % (ref 20–50)
SODIUM SERPL-SCNC: 140 MMOL/L (ref 136–145)
TOTAL IRON BINDING CAPACITY: 583 UG/DL (ref 250–450)
TRANSFERRIN SERPL-MCNC: 394 MG/DL (ref 200–375)
TRIGL SERPL-MCNC: 35 MG/DL (ref 30–150)
TSH SERPL DL<=0.005 MIU/L-ACNC: 1.34 UIU/ML (ref 0.4–4)
VIT B12 SERPL-MCNC: 281 PG/ML (ref 210–950)
WBC # BLD AUTO: 5.39 K/UL (ref 3.9–12.7)

## 2024-01-09 PROCEDURE — 82728 ASSAY OF FERRITIN: CPT | Performed by: NURSE PRACTITIONER

## 2024-01-09 PROCEDURE — 84443 ASSAY THYROID STIM HORMONE: CPT | Performed by: NURSE PRACTITIONER

## 2024-01-09 PROCEDURE — 85025 COMPLETE CBC W/AUTO DIFF WBC: CPT | Performed by: NURSE PRACTITIONER

## 2024-01-09 PROCEDURE — 83540 ASSAY OF IRON: CPT | Performed by: NURSE PRACTITIONER

## 2024-01-09 PROCEDURE — 80061 LIPID PANEL: CPT | Performed by: NURSE PRACTITIONER

## 2024-01-09 PROCEDURE — 36415 COLL VENOUS BLD VENIPUNCTURE: CPT | Mod: PO | Performed by: NURSE PRACTITIONER

## 2024-01-09 PROCEDURE — 83036 HEMOGLOBIN GLYCOSYLATED A1C: CPT | Performed by: NURSE PRACTITIONER

## 2024-01-09 PROCEDURE — 80053 COMPREHEN METABOLIC PANEL: CPT | Performed by: NURSE PRACTITIONER

## 2024-01-09 PROCEDURE — 82607 VITAMIN B-12: CPT | Performed by: NURSE PRACTITIONER

## 2024-01-09 RX ORDER — LISDEXAMFETAMINE DIMESYLATE CAPSULES 30 MG/1
30 CAPSULE ORAL EVERY MORNING
Qty: 30 CAPSULE | Refills: 0 | Status: SHIPPED | OUTPATIENT
Start: 2024-01-09 | End: 2024-01-23 | Stop reason: SDUPTHER

## 2024-01-09 RX ORDER — FLUOXETINE HYDROCHLORIDE 20 MG/1
20 CAPSULE ORAL DAILY
Qty: 90 CAPSULE | Refills: 0 | Status: SHIPPED | OUTPATIENT
Start: 2024-01-09 | End: 2024-01-23 | Stop reason: SDUPTHER

## 2024-01-10 ENCOUNTER — PATIENT MESSAGE (OUTPATIENT)
Dept: FAMILY MEDICINE | Facility: CLINIC | Age: 34
End: 2024-01-10
Payer: COMMERCIAL

## 2024-01-10 DIAGNOSIS — D50.9 IRON DEFICIENCY ANEMIA, UNSPECIFIED IRON DEFICIENCY ANEMIA TYPE: Primary | ICD-10-CM

## 2024-01-23 ENCOUNTER — OFFICE VISIT (OUTPATIENT)
Dept: PSYCHIATRY | Facility: CLINIC | Age: 34
End: 2024-01-23
Payer: COMMERCIAL

## 2024-01-23 VITALS
HEART RATE: 77 BPM | HEIGHT: 62 IN | SYSTOLIC BLOOD PRESSURE: 111 MMHG | DIASTOLIC BLOOD PRESSURE: 76 MMHG | WEIGHT: 178.13 LBS | BODY MASS INDEX: 32.78 KG/M2

## 2024-01-23 DIAGNOSIS — F90.2 ADHD (ATTENTION DEFICIT HYPERACTIVITY DISORDER), COMBINED TYPE: ICD-10-CM

## 2024-01-23 DIAGNOSIS — F41.1 GENERALIZED ANXIETY DISORDER: Primary | ICD-10-CM

## 2024-01-23 PROCEDURE — 99999 PR PBB SHADOW E&M-EST. PATIENT-LVL III: CPT | Mod: PBBFAC,,, | Performed by: PSYCHOLOGIST

## 2024-01-23 PROCEDURE — 3074F SYST BP LT 130 MM HG: CPT | Mod: CPTII,S$GLB,, | Performed by: PSYCHOLOGIST

## 2024-01-23 PROCEDURE — 3044F HG A1C LEVEL LT 7.0%: CPT | Mod: CPTII,S$GLB,, | Performed by: PSYCHOLOGIST

## 2024-01-23 PROCEDURE — 3078F DIAST BP <80 MM HG: CPT | Mod: CPTII,S$GLB,, | Performed by: PSYCHOLOGIST

## 2024-01-23 PROCEDURE — 3008F BODY MASS INDEX DOCD: CPT | Mod: CPTII,S$GLB,, | Performed by: PSYCHOLOGIST

## 2024-01-23 PROCEDURE — 99214 OFFICE O/P EST MOD 30 MIN: CPT | Mod: S$GLB,,, | Performed by: PSYCHOLOGIST

## 2024-01-23 PROCEDURE — 1159F MED LIST DOCD IN RCRD: CPT | Mod: CPTII,S$GLB,, | Performed by: PSYCHOLOGIST

## 2024-01-23 RX ORDER — FLUOXETINE HYDROCHLORIDE 20 MG/1
20 CAPSULE ORAL DAILY
Qty: 90 CAPSULE | Refills: 0 | Status: SHIPPED | OUTPATIENT
Start: 2024-01-23 | End: 2024-05-09

## 2024-01-23 RX ORDER — LISDEXAMFETAMINE DIMESYLATE 30 MG/1
30 CAPSULE ORAL EVERY MORNING
Qty: 30 CAPSULE | Refills: 0 | Status: SHIPPED | OUTPATIENT
Start: 2024-01-23 | End: 2024-03-01 | Stop reason: SDUPTHER

## 2024-01-23 NOTE — PROGRESS NOTES
Outpatient Psychiatry Follow-Up Visit    Clinical Status of Patient: Outpatient (Ambulatory)  01/23/2024     Chief Complaint: 33 year old female presenting today for a follow-up.       Interval History and Content of Current Session:  Interim Events/Subjective Report/Content of Current Session:  follow-up appointment.    Pt is a 33 year old female with past psychiatric hx of ADHD and anxiety who presents for follow-up treatment. Pt still finds that Vyvanse has been very effective and her mood is good. The climate and work distribution is better at work. Pt denied any other major changes or new stressors.    Past Psychiatric hx: Vyvanse    Past Medical hx:   Past Medical History:   Diagnosis Date    Asthma     seasonal    GERD (gastroesophageal reflux disease)     Kidney stones     PONV (postoperative nausea and vomiting)     Spinal headache         Interim hx:  Medication changes last visit:   none  Anxiety: mild - unchanged  Depression: pt denied     Denies suicidal/homicidal ideations.  Denies hopelessness/worthlessness.    Denies auditory/visual hallucinations      Alcohol: minimal, infrequent  Drug: pt denied  Caffeine: minimal use  Tobacco: pt denied      Review of Systems   PSYCHIATRIC: Pertinent items are noted in the narrative.        CONSTITUTIONAL: weight stable    Past Medical, Family and Social History: The patient's past medical, family and social history have been reviewed and updated as appropriate within the electronic medical record. See encounter notes.     Current Psychiatric Medication:  Vyvanse 30mg Q AM, Prozac 20mg Q D, and Atarax 10mg TID PRN     Compliance: yes      Side effects: pt denied     Risk Parameters:  Patient reports no suicidal ideation  Patient reports no homicidal ideation  Patient reports no self-injurious behavior  Patient reports no violent behavior     Exam (detailed: at least 9 elements; comprehensive: all 15 elements)   Constitutional  Vitals:  Most recent vital signs,  dated less than 90 days prior to this appointment, were reviewed. Pulse:  [77]   BP: (111)/(76)       General:  unremarkable, age appropriate, casual attire, good eye contact, good rapport       Musculoskeletal  Muscle Strength/Tone:  no flaccidity, no tremor    Gait & Station:  normal      Psychiatric                       Speech:  normal tone, normal rate, rhythm, and volume   Mood & Affect:   Depressed, anxious         Thought Process:   Goal directed; Linear    Associations:   intact   Thought Content:   No SI/HI, delusions, or paranoia, no AV/VH   Insight & Judgement:   Good, adequate to circumstances   Orientation:   grossly intact; alert and oriented x 4    Memory:  intact for content of interview    Language:  grossly intact, can repeat    Attention Span  : Grossly intact for content of interview   Fund of Knowledge:   intact and appropriate to age and level of education        Assessment and Diagnosis   Status/Progress: stable     Impression:Pt reported lifelong chronic worry. Pt described excessive, unproductive worry that is difficult to control. Pt explained that worrying about things outside of loc[place] of control and worst case scenarios and described this worry as ruminative consistent with generalized anxiety disorder. Pt also endorses symptoms consistent with ADHD and after testing a diagnosis is confirmed. Pt should continue in psychiatric medication management.    Diagnosis(es):   1) Generalized Anxiety Disorder  2) ADHD, combined type    Intervention/Counseling/Treatment Plan   Medication Management:      1. Continue Vyvanse 30mg Q AM, Prozac 20mg Q D, and Atarax 10mg TID PRN     2. Call to report any worsening of symptoms or problems with the medication. Pt instructed to go to ER with thoughts of harming self, others    Psychotherapy:   Target symptoms: anxiety, attention/concentration  Why chosen therapy is appropriate versus another modality: CBT used; relevant to diagnosis, patient responds to  this modality  Outcome monitoring methods: self-report, observation  Therapeutic intervention type: Cognitive Behavioral Therapy  Topics discussed/themes: building skills sets for symptom management, symptom recognition, nutrition, exercise  The patient's response to the intervention is good  Patient's response to treatment is: good.   The patient's progress toward treatment goals: stable     Return to clinic: 4 months    -Cognitive-Behavioral/Supportive therapy and psychoeducation provided  -R/B/SE's of medications discussed with the pt who expresses understanding and chooses to take medications as prescribed.   -Pt instructed to call clinic, 911 or go to nearest emergency room if sxs worsen or pt is in   crisis. The pt expresses understanding.    Zen Garcia, PhD, MP

## 2024-01-24 NOTE — PLAN OF CARE
Report to Chasidy. Patient in no distress.Eyes closed, respirations even and unlabored.Pain level 6 at present.   No indicators present

## 2024-02-14 RX ORDER — FLUTICASONE PROPIONATE 50 MCG
2 SPRAY, SUSPENSION (ML) NASAL DAILY
Qty: 9.9 ML | Refills: 3 | Status: SHIPPED | OUTPATIENT
Start: 2024-02-14

## 2024-02-22 ENCOUNTER — PATIENT MESSAGE (OUTPATIENT)
Dept: FAMILY MEDICINE | Facility: CLINIC | Age: 34
End: 2024-02-22
Payer: COMMERCIAL

## 2024-02-23 ENCOUNTER — OFFICE VISIT (OUTPATIENT)
Dept: FAMILY MEDICINE | Facility: CLINIC | Age: 34
End: 2024-02-23
Payer: COMMERCIAL

## 2024-02-23 DIAGNOSIS — D50.9 IRON DEFICIENCY ANEMIA, UNSPECIFIED IRON DEFICIENCY ANEMIA TYPE: ICD-10-CM

## 2024-02-23 DIAGNOSIS — G44.219 EPISODIC TENSION-TYPE HEADACHE, NOT INTRACTABLE: Primary | ICD-10-CM

## 2024-02-23 PROCEDURE — 1159F MED LIST DOCD IN RCRD: CPT | Mod: CPTII,95,, | Performed by: NURSE PRACTITIONER

## 2024-02-23 PROCEDURE — 1160F RVW MEDS BY RX/DR IN RCRD: CPT | Mod: CPTII,95,, | Performed by: NURSE PRACTITIONER

## 2024-02-23 PROCEDURE — 99212 OFFICE O/P EST SF 10 MIN: CPT | Mod: 95,,, | Performed by: NURSE PRACTITIONER

## 2024-02-23 PROCEDURE — 3044F HG A1C LEVEL LT 7.0%: CPT | Mod: CPTII,95,, | Performed by: NURSE PRACTITIONER

## 2024-02-23 RX ORDER — BUTALBITAL, ACETAMINOPHEN AND CAFFEINE 50; 325; 40 MG/1; MG/1; MG/1
1 TABLET ORAL EVERY 6 HOURS PRN
Qty: 30 TABLET | Refills: 3 | Status: SHIPPED | OUTPATIENT
Start: 2024-02-23 | End: 2024-03-24

## 2024-02-23 NOTE — PROGRESS NOTES
Answers submitted by the patient for this visit:  Review of Systems Questionnaire (Submitted on 2/23/2024)  activity change: No  unexpected weight change: No  neck pain: No  hearing loss: No  rhinorrhea: No  trouble swallowing: No  eye discharge: No  visual disturbance: No  chest tightness: No  wheezing: No  chest pain: No  palpitations: No  blood in stool: No  constipation: No  vomiting: No  diarrhea: No  polydipsia: No  polyuria: No  difficulty urinating: No  hematuria: No  menstrual problem: No  dysuria: No  joint swelling: No  headaches: Yes  weakness: No  confusion: No  dysphoric mood: No    The patient location is:  [place]  The chief complaint leading to consultation is:  Headache    Visit type: audiovisual    Face to Face time with patient: 10  15 minutes of total time spent on the encounter, which includes face to face time and non-face to face time preparing to see the patient (eg, review of tests), Obtaining and/or reviewing separately obtained history, Documenting clinical information in the electronic or other health record, Independently interpreting results (not separately reported) and communicating results to the patient/family/caregiver, or Care coordination (not separately reported).     Each patient to whom he or she provides medical services by telemedicine is:  (1) informed of the relationship between the physician and patient and the respective role of any other health care provider with respect to management of the patient; and (2) notified that he or she may decline to receive medical services by telemedicine and may withdraw from such care at any time.      Subjective:       Patient ID: Wendy Millan is a 33 y.o. female.    Chief Complaint: Headaches    HPI The past two months has had heavy menstrual cycles and headaches. Brain fog, back of neck feels tight. Fioricet given to her by her mother really helped. Tried goody powder didn't help.  Recently received iron infusion for  saturated iron 4,  states she has labs scheduled for next week to follow up.    She would like prescription for Fioricet to take as needed for headaches.  No other concerns.  See ROS    The following portion of the patients history was reviewed and updated as appropriate: allergies, current medications, past medical and surgical history. Past social history and problem list reviewed. Family PMH and Past social history reviewed. Tobacco, Illicit drug use reviewed.      Review of patient's allergies indicates:  No Known Allergies      Current Outpatient Medications:     albuterol (PROVENTIL/VENTOLIN HFA) 90 mcg/actuation inhaler, Inhale 2 puffs into the lungs every 6 (six) hours as needed for Wheezing. Rescue, Disp: 18 g, Rfl: 1    FLUoxetine 20 MG capsule, Take 1 capsule (20 mg total) by mouth once daily., Disp: 90 capsule, Rfl: 0    fluticasone propionate (FLONASE) 50 mcg/actuation nasal spray, 2 sprays (100 mcg total) by Each Nostril route once daily., Disp: 9.9 mL, Rfl: 3    hydrOXYzine HCL (ATARAX) 10 MG Tab, Take 1 tablet (10 mg total) by mouth 3 (three) times daily as needed (anxiey)., Disp: 30 tablet, Rfl: 3    lisdexamfetamine (VYVANSE) 30 MG capsule, Take 1 capsule (30 mg total) by mouth every morning., Disp: 30 capsule, Rfl: 0    multivitamin with minerals (HAIR,SKIN AND NAILS ORAL), Take by mouth., Disp: , Rfl:     omeprazole (PRILOSEC) 40 MG capsule, Take 1 capsule (40 mg total) by mouth once daily., Disp: 90 capsule, Rfl: 3    promethazine (PHENERGAN) 25 MG tablet, Take 1 tablet (25 mg total) by mouth every 4 (four) hours., Disp: 30 tablet, Rfl: 0  No current facility-administered medications for this visit.    Facility-Administered Medications Ordered in Other Visits:     lactated ringers infusion, , Intravenous, Continuous, Volpi-Abadie, Jacqueline, MD, Stopped at 06/09/23 1109    LIDOcaine (PF) 10 mg/ml (1%) injection 10 mg, 1 mL, Other, Once, Volpi-Abadie, Jacqueline, MD    Past Medical History:    Diagnosis Date    Asthma     seasonal    GERD (gastroesophageal reflux disease)     Kidney stones     PONV (postoperative nausea and vomiting)     Spinal headache        Past Surgical History:   Procedure Laterality Date    EXTRACORPOREAL SHOCK WAVE LITHOTRIPSY      with stent placement    STOMACH SURGERY  07/2017    sleeve, 60 lbs weight loss    TONSILLECTOMY, ADENOIDECTOMY N/A 6/9/2023    Procedure: TONSILLECTOMY AND ADENOIDECTOMY;  Surgeon: Rome Cano MD;  Location: [facility];  Service: ENT;  Laterality: N/A;    WISDOM TOOTH EXTRACTION         Social History     Socioeconomic History    Marital status: Single   Tobacco Use    Smoking status: Never    Smokeless tobacco: Never   Substance and Sexual Activity    Alcohol use: Yes     Comment: social    Drug use: No    Sexual activity: Never     Social Determinants of Health     Financial Resource Strain: Low Risk  (1/4/2024)    Overall Financial Resource Strain (CARDIA)     Difficulty of Paying Living Expenses: Not very hard   Food Insecurity: No Food Insecurity (1/4/2024)    Hunger Vital Sign     Worried About Running Out of Food in the Last Year: Never true     Ran Out of Food in the Last Year: Never true   Transportation Needs: No Transportation Needs (1/4/2024)    PRAPARE - Transportation     Lack of Transportation (Medical): No     Lack of Transportation (Non-Medical): No   Physical Activity: Sufficiently Active (1/4/2024)    Exercise Vital Sign     Days of Exercise per Week: 5 days     Minutes of Exercise per Session: 40 min   Stress: No Stress Concern Present (1/4/2024)    [employer] - Occupational Stress Questionnaire     Feeling of Stress : Only a little   Social Connections: Unknown (1/4/2024)    Social Connection and Isolation Panel [NHANES]     Frequency of Communication with Friends and Family: More than three times a week     Frequency of Social Gatherings with Friends and Family: More than three times a week      Active Member of Clubs or Organizations: No     Attends Club or Organization Meetings: Patient declined     Marital Status: Never    Housing Stability: Low Risk  (1/4/2024)    Housing Stability Vital Sign     Unable to Pay for Housing in the Last Year: No     Number of Places Lived in the Last Year: 1     Unstable Housing in the Last Year: No     Review of Systems   Constitutional:  Positive for fatigue (Improving). Negative for activity change and unexpected weight change.   HENT:  Negative for hearing loss, rhinorrhea and trouble swallowing.    Eyes:  Negative for visual disturbance.   Respiratory:  Negative for cough, chest tightness, shortness of breath and wheezing.    Cardiovascular:  Negative for chest pain and palpitations.   Gastrointestinal:  Negative for abdominal pain, constipation, diarrhea, nausea and vomiting.   Genitourinary:  Negative for difficulty urinating, dysuria, hematuria and menstrual problem.   Musculoskeletal:  Negative for arthralgias, joint swelling and neck pain.   Neurological:  Positive for headaches. Negative for weakness.   Psychiatric/Behavioral:  Negative for confusion, dysphoric mood and sleep disturbance.        Objective:         Physical Exam  Constitutional:       General: She is not in acute distress.     Appearance: She is well-developed.   Pulmonary:      Effort: No accessory muscle usage or respiratory distress.   Musculoskeletal:      Cervical back: Normal range of motion.      Comments: Coordination appears normal per video   Neurological:      Mental Status: She is alert and oriented to person, place, and time.   Psychiatric:         Speech: Speech normal.         Behavior: Behavior normal.         Thought Content: Thought content normal.         Assessment:       1. Episodic tension-type headache, not intractable    2. Iron deficiency anemia, unspecified iron deficiency anemia type        Plan:       Episodic tension-type headache, not intractable:  Fioricet as  directed.  If not improving can refer to Neurology, headache clinic.    Iron deficiency anemia, unspecified iron deficiency anemia type:  Continue to follow with PCP    Other orders  -     butalbital-acetaminophen-caffeine -40 mg (FIORICET, ESGIC) -40 mg per tablet; Take 1 tablet by mouth every 6 (six) hours as needed for Headaches.  Dispense: 30 tablet; Refill: 3       Continue current medication  Take medications only as prescribed  Healthy diet, exercise  Adequate rest  Adequate hydration  Avoid allergens  Avoid excessive caffeine     Follow-up as scheduled with PCP

## 2024-03-01 RX ORDER — LISDEXAMFETAMINE DIMESYLATE 30 MG/1
30 CAPSULE ORAL EVERY MORNING
Qty: 30 CAPSULE | Refills: 0 | Status: SHIPPED | OUTPATIENT
Start: 2024-03-01 | End: 2024-03-11 | Stop reason: SDUPTHER

## 2024-03-11 ENCOUNTER — PATIENT MESSAGE (OUTPATIENT)
Dept: PSYCHIATRY | Facility: CLINIC | Age: 34
End: 2024-03-11
Payer: COMMERCIAL

## 2024-03-11 RX ORDER — LISDEXAMFETAMINE DIMESYLATE 30 MG/1
30 CAPSULE ORAL EVERY MORNING
Qty: 30 CAPSULE | Refills: 0 | Status: SHIPPED | OUTPATIENT
Start: 2024-03-11 | End: 2024-04-20 | Stop reason: SDUPTHER

## 2024-03-20 ENCOUNTER — PATIENT MESSAGE (OUTPATIENT)
Dept: FAMILY MEDICINE | Facility: CLINIC | Age: 34
End: 2024-03-20
Payer: COMMERCIAL

## 2024-03-20 RX ORDER — TRIAMCINOLONE ACETONIDE 5 MG/G
CREAM TOPICAL 2 TIMES DAILY
Qty: 15 G | Refills: 1 | Status: SHIPPED | OUTPATIENT
Start: 2024-03-20

## 2024-04-17 ENCOUNTER — PATIENT MESSAGE (OUTPATIENT)
Dept: FAMILY MEDICINE | Facility: CLINIC | Age: 34
End: 2024-04-17
Payer: COMMERCIAL

## 2024-04-17 DIAGNOSIS — K21.9 GASTROESOPHAGEAL REFLUX DISEASE, UNSPECIFIED WHETHER ESOPHAGITIS PRESENT: ICD-10-CM

## 2024-04-17 RX ORDER — FLUTICASONE PROPIONATE 50 MCG
2 SPRAY, SUSPENSION (ML) NASAL DAILY
Qty: 9.9 ML | Refills: 3 | OUTPATIENT
Start: 2024-04-17

## 2024-04-17 RX ORDER — DICYCLOMINE HYDROCHLORIDE 20 MG/1
20 TABLET ORAL EVERY 6 HOURS PRN
Qty: 120 TABLET | Refills: 1 | Status: SHIPPED | OUTPATIENT
Start: 2024-04-17 | End: 2024-05-17

## 2024-04-17 RX ORDER — ALBUTEROL SULFATE 90 UG/1
2 AEROSOL, METERED RESPIRATORY (INHALATION) EVERY 6 HOURS PRN
Qty: 18 G | Refills: 1 | Status: SHIPPED | OUTPATIENT
Start: 2024-04-17 | End: 2025-04-17

## 2024-04-17 RX ORDER — OMEPRAZOLE 40 MG/1
40 CAPSULE, DELAYED RELEASE ORAL DAILY
Qty: 90 CAPSULE | Refills: 3 | OUTPATIENT
Start: 2024-04-17

## 2024-04-17 NOTE — TELEPHONE ENCOUNTER
Pt is requesting a bentyl prescription for prn abd cramping.  Please advise, not on current med list.

## 2024-04-20 RX ORDER — LISDEXAMFETAMINE DIMESYLATE 30 MG/1
30 CAPSULE ORAL EVERY MORNING
Qty: 30 CAPSULE | Refills: 0 | Status: CANCELLED | OUTPATIENT
Start: 2024-04-20

## 2024-04-22 RX ORDER — LISDEXAMFETAMINE DIMESYLATE 30 MG/1
30 CAPSULE ORAL EVERY MORNING
Qty: 30 CAPSULE | Refills: 0 | Status: SHIPPED | OUTPATIENT
Start: 2024-04-22

## 2024-05-09 ENCOUNTER — PATIENT MESSAGE (OUTPATIENT)
Dept: PSYCHIATRY | Facility: CLINIC | Age: 34
End: 2024-05-09
Payer: COMMERCIAL

## 2024-05-09 RX ORDER — FLUOXETINE HYDROCHLORIDE 40 MG/1
40 CAPSULE ORAL DAILY
Qty: 30 CAPSULE | Refills: 1 | Status: SHIPPED | OUTPATIENT
Start: 2024-05-09 | End: 2024-07-08

## 2024-06-19 ENCOUNTER — PATIENT MESSAGE (OUTPATIENT)
Dept: PSYCHIATRY | Facility: CLINIC | Age: 34
End: 2024-06-19
Payer: COMMERCIAL

## 2024-06-21 ENCOUNTER — OFFICE VISIT (OUTPATIENT)
Dept: PSYCHIATRY | Facility: CLINIC | Age: 34
End: 2024-06-21
Payer: COMMERCIAL

## 2024-06-21 DIAGNOSIS — F41.1 GENERALIZED ANXIETY DISORDER: ICD-10-CM

## 2024-06-21 DIAGNOSIS — F90.2 ADHD (ATTENTION DEFICIT HYPERACTIVITY DISORDER), COMBINED TYPE: Primary | ICD-10-CM

## 2024-06-21 RX ORDER — FLUOXETINE HYDROCHLORIDE 40 MG/1
40 CAPSULE ORAL DAILY
Qty: 90 CAPSULE | Refills: 1 | Status: SHIPPED | OUTPATIENT
Start: 2024-06-21 | End: 2024-12-18

## 2024-06-21 RX ORDER — LISDEXAMFETAMINE DIMESYLATE 30 MG/1
30 CAPSULE ORAL EVERY MORNING
Qty: 30 CAPSULE | Refills: 0 | Status: SHIPPED | OUTPATIENT
Start: 2024-06-21

## 2024-06-21 NOTE — LETTER
June 21, 2024        [place]  [address]  [facility] [address]             [facility]  [address] [facility] [address]  [facility] [address]  Phone: [phone]   Patient: Wendy Millan   MR Number: [id number]   YOB: 1990   Date of Visit: 6/21/2024       Dear Ms. Thomason:    Thank you for referring Wendy Millan to me for evaluation. Below are the relevant portions of my assessment and plan of care.    Pt is stable on Vyvanse 30mg Q AM, Prozac 40mg Q D, and Atarax 10mg TID PRN. Will continue to monitor and follow.    If you have questions, please do not hesitate to call me. I look forward to following Wendy along with you.    Sincerely,      Zen Garcia, PhD, MP           CC    No Recipients

## 2024-06-21 NOTE — PROGRESS NOTES
Outpatient Psychiatry Follow-Up Visit    Clinical Status of Patient: Outpatient (Ambulatory)  06/21/2024     Chief Complaint: 33 year old female presenting today for a follow-up.       Interval History and Content of Current Session:  Interim Events/Subjective Report/Content of Current Session:  follow-up appointment.    Pt is a 33 year old female with past psychiatric hx of ADHD and anxiety who presents for follow-up treatment. Between sessions we increased her Prozac as she was highly anxious and pt feels much better. Pt finds Vyvanse very effective. Pt denied any other major changes or new stressors.    Past Psychiatric hx: Vyvanse    Past Medical hx:   Past Medical History:   Diagnosis Date    Asthma     seasonal    GERD (gastroesophageal reflux disease)     Kidney stones     PONV (postoperative nausea and vomiting)     Spinal headache         Interim hx:  Medication changes last visit:   increased Prozac dosage to 40mg Q D  Anxiety: mild - unchanged  Depression: pt denied     Denies suicidal/homicidal ideations.  Denies hopelessness/worthlessness.    Denies auditory/visual hallucinations      Alcohol: minimal, infrequent  Drug: pt denied  Caffeine: minimal use  Tobacco: pt denied      Review of Systems   PSYCHIATRIC: Pertinent items are noted in the narrative.        CONSTITUTIONAL: weight stable    Past Medical, Family and Social History: The patient's past medical, family and social history have been reviewed and updated as appropriate within the electronic medical record. See encounter notes.     Current Psychiatric Medication:  Vyvanse 30mg Q AM, Prozac 40mg Q D, and Atarax 10mg TID PRN     Compliance: yes      Side effects: pt denied     Risk Parameters:  Patient reports no suicidal ideation  Patient reports no homicidal ideation  Patient reports no self-injurious behavior  Patient reports no violent behavior     Exam (detailed: at least 9 elements; comprehensive: all 15 elements)    Constitutional  Vitals:  Most recent vital signs, dated less than 90 days prior to this appointment, were reviewed.        General:  unremarkable, age appropriate, casual attire, good eye contact, good rapport       Musculoskeletal  Muscle Strength/Tone:  no flaccidity, no tremor    Gait & Station:  normal      Psychiatric                       Speech:  normal tone, normal rate, rhythm, and volume   Mood & Affect:   Depressed, anxious         Thought Process:   Goal directed; Linear    Associations:   intact   Thought Content:   No SI/HI, delusions, or paranoia, no AV/VH   Insight & Judgement:   Good, adequate to circumstances   Orientation:   grossly intact; alert and oriented x 4    Memory:  intact for content of interview    Language:  grossly intact, can repeat    Attention Span  : Grossly intact for content of interview   Fund of Knowledge:   intact and appropriate to age and level of education        Assessment and Diagnosis   Status/Progress: improving     Impression:Pt reported lifelong chronic worry. Pt described excessive, unproductive worry that is difficult to control. Pt explained that worrying about things outside of locust of control and worst case scenarios and described this worry as ruminative consistent with generalized anxiety disorder. Pt also endorses symptoms consistent with ADHD and after testing a diagnosis is confirmed. Pt should continue in psychiatric medication management.    Diagnosis(es):   1) Generalized Anxiety Disorder  2) ADHD, combined type    Intervention/Counseling/Treatment Plan   Medication Management:      1. Continue Vyvanse 30mg Q AM, Prozac 40mg Q D, and Atarax 10mg TID PRN     2. Call to report any worsening of symptoms or problems with the medication. Pt instructed to go to ER with thoughts of harming self, others    Psychotherapy:   Target symptoms: anxiety, attention/concentration  Why chosen therapy is appropriate versus another modality: CBT used; relevant to  diagnosis, patient responds to this modality  Outcome monitoring methods: self-report, observation  Therapeutic intervention type: Cognitive Behavioral Therapy  Topics discussed/themes: building skills sets for symptom management, symptom recognition, nutrition, exercise  The patient's response to the intervention is good  Patient's response to treatment is: good.   The patient's progress toward treatment goals: stable     Return to clinic: 4 months    -Cognitive-Behavioral/Supportive therapy and psychoeducation provided  -R/B/SE's of medications discussed with the pt who expresses understanding and chooses to take medications as prescribed.   -Pt instructed to call clinic, 911 or go to nearest emergency room if sxs worsen or pt is in   crisis. The pt expresses understanding.    Zen Garcia, PhD, MP

## 2024-07-08 ENCOUNTER — PATIENT MESSAGE (OUTPATIENT)
Dept: FAMILY MEDICINE | Facility: CLINIC | Age: 34
End: 2024-07-08
Payer: COMMERCIAL

## 2024-07-09 RX ORDER — PROMETHAZINE HYDROCHLORIDE AND DEXTROMETHORPHAN HYDROBROMIDE 6.25; 15 MG/5ML; MG/5ML
5 SYRUP ORAL EVERY 4 HOURS PRN
Qty: 240 ML | Refills: 0 | Status: SHIPPED | OUTPATIENT
Start: 2024-07-09 | End: 2024-07-19

## 2024-07-09 RX ORDER — PROMETHAZINE HYDROCHLORIDE AND CODEINE PHOSPHATE 6.25; 1 MG/5ML; MG/5ML
5 SOLUTION ORAL EVERY 6 HOURS PRN
Qty: 120 ML | Refills: 0 | Status: SHIPPED | OUTPATIENT
Start: 2024-07-09 | End: 2024-07-09

## 2024-07-10 ENCOUNTER — TELEPHONE (OUTPATIENT)
Dept: FAMILY MEDICINE | Facility: CLINIC | Age: 34
End: 2024-07-10
Payer: COMMERCIAL

## 2024-07-10 NOTE — TELEPHONE ENCOUNTER
----- Message from Jihan Barajas sent at 7/9/2024  3:04 PM CDT -----  Contact: PHARM  Type: Needs Medical Advice         Who Called: Pharm  Best Call Back Number:[phone]   Additional Information: Requesting a call back regarding  the rx for promethazine-codeine 6.25-10 mg/5 ml (PHENERGAN WITH CODEINE) 6.25-10 mg/5 mL syrupis not longer produced. Pharm said to send promethazine DM .         [facility] - [place], [place] - [address] [address]  Phone: [phone] Fax: [phone]          Please Advise- Thank you

## 2024-08-01 ENCOUNTER — PATIENT MESSAGE (OUTPATIENT)
Dept: PSYCHIATRY | Facility: CLINIC | Age: 34
End: 2024-08-01
Payer: COMMERCIAL

## 2024-08-07 RX ORDER — LISDEXAMFETAMINE DIMESYLATE 30 MG/1
30 CAPSULE ORAL EVERY MORNING
Qty: 30 CAPSULE | Refills: 0 | Status: SHIPPED | OUTPATIENT
Start: 2024-08-07

## 2024-08-13 ENCOUNTER — PATIENT MESSAGE (OUTPATIENT)
Dept: FAMILY MEDICINE | Facility: CLINIC | Age: 34
End: 2024-08-13
Payer: COMMERCIAL

## 2024-08-13 DIAGNOSIS — B37.0 THRUSH: Primary | ICD-10-CM

## 2024-08-14 ENCOUNTER — E-VISIT (OUTPATIENT)
Dept: FAMILY MEDICINE | Facility: CLINIC | Age: 34
End: 2024-08-14
Payer: COMMERCIAL

## 2024-08-14 DIAGNOSIS — B37.0 ORAL THRUSH: Primary | ICD-10-CM

## 2024-08-14 RX ORDER — FLUCONAZOLE 150 MG/1
150 TABLET ORAL DAILY
Qty: 2 TABLET | Refills: 0 | Status: SHIPPED | OUTPATIENT
Start: 2024-08-14 | End: 2024-08-16

## 2024-08-14 RX ORDER — NYSTATIN 100000 [USP'U]/ML
4 SUSPENSION ORAL 4 TIMES DAILY
Qty: 160 ML | Refills: 0 | Status: SHIPPED | OUTPATIENT
Start: 2024-08-14 | End: 2024-08-24

## 2024-08-14 NOTE — PROGRESS NOTES
Subjective:       Patient ID: Wendy Millan is a 33 y.o. female.    Chief Complaint: General Illness (Entered automatically based on patient selection in [employer].)    HPI  The following portion of the patients history was reviewed and updated as appropriate: allergies, current medications, past medical and surgical history. Past social history and problem list reviewed. Family PMH and Past social history reviewed. Tobacco, Illicit drug use reviewed.      Review of patient's allergies indicates:  No Known Allergies      Current Outpatient Medications:     albuterol (PROVENTIL/VENTOLIN HFA) 90 mcg/actuation inhaler, Inhale 2 puffs into the lungs every 6 (six) hours as needed for Wheezing. Rescue, Disp: 18 g, Rfl: 1    FLUoxetine 40 MG capsule, Take 1 capsule (40 mg total) by mouth once daily., Disp: 90 capsule, Rfl: 1    fluticasone propionate (FLONASE) 50 mcg/actuation nasal spray, 2 sprays (100 mcg total) by Each Nostril route once daily., Disp: 9.9 mL, Rfl: 3    hydrOXYzine HCL (ATARAX) 10 MG Tab, Take 1 tablet (10 mg total) by mouth 3 (three) times daily as needed (anxiey)., Disp: 30 tablet, Rfl: 3    lisdexamfetamine (VYVANSE) 30 MG capsule, Take 1 capsule (30 mg total) by mouth every morning., Disp: 30 capsule, Rfl: 0    multivitamin with minerals (HAIR,SKIN AND NAILS ORAL), Take by mouth., Disp: , Rfl:     omeprazole (PRILOSEC) 40 MG capsule, Take 1 capsule (40 mg total) by mouth once daily., Disp: 90 capsule, Rfl: 3    promethazine (PHENERGAN) 25 MG tablet, Take 1 tablet (25 mg total) by mouth every 4 (four) hours., Disp: 30 tablet, Rfl: 0    triamcinolone acetonide 0.5% (KENALOG) 0.5 % Crea, Apply topically 2 (two) times daily., Disp: 15 g, Rfl: 1  No current facility-administered medications for this visit.    Facility-Administered Medications Ordered in Other Visits:     lactated ringers infusion, , Intravenous, Continuous, Volpi-Abadie, Jacqueline, MD, Stopped at 06/09/23 1109    LIDOcaine (PF)  10 mg/ml (1%) injection 10 mg, 1 mL, Other, Once, Volpi-Abadie, Jacqueline, MD    Past Medical History:   Diagnosis Date    Asthma     seasonal    GERD (gastroesophageal reflux disease)     Kidney stones     PONV (postoperative nausea and vomiting)     Spinal headache        Past Surgical History:   Procedure Laterality Date    EXTRACORPOREAL SHOCK WAVE LITHOTRIPSY      with stent placement    STOMACH SURGERY  07/2017    sleeve, 60 lbs weight loss    TONSILLECTOMY, ADENOIDECTOMY N/A 6/9/2023    Procedure: TONSILLECTOMY AND ADENOIDECTOMY;  Surgeon: Rome Cano MD;  Location: [facility];  Service: ENT;  Laterality: N/A;    WISDOM TOOTH EXTRACTION         Social History     Socioeconomic History    Marital status: Single   Tobacco Use    Smoking status: Never    Smokeless tobacco: Never   Substance and Sexual Activity    Alcohol use: Yes     Comment: social    Drug use: No    Sexual activity: Never     Social Determinants of Health     Financial Resource Strain: Low Risk  (1/4/2024)    Overall Financial Resource Strain (CARDIA)     Difficulty of Paying Living Expenses: Not very hard   Food Insecurity: No Food Insecurity (1/4/2024)    Hunger Vital Sign     Worried About Running Out of Food in the Last Year: Never true     Ran Out of Food in the Last Year: Never true   Transportation Needs: No Transportation Needs (1/4/2024)    PRAPARE - Transportation     Lack of Transportation (Medical): No     Lack of Transportation (Non-Medical): No   Physical Activity: Sufficiently Active (1/4/2024)    Exercise Vital Sign     Days of Exercise per Week: 5 days     Minutes of Exercise per Session: 40 min   Stress: No Stress Concern Present (1/4/2024)    [employer] - Occupational Stress Questionnaire     Feeling of Stress : Only a little   Housing Stability: Low Risk  (1/4/2024)    Housing Stability Vital Sign     Unable to Pay for Housing in the Last Year: No     Number of Places Lived in the Last  Year: 1     Unstable Housing in the Last Year: No     Review of Systems  see Questionnaire  Objective:         Physical Exam   E visit  Assessment:       1. Oral thrush        Plan:       Oral thrush: take medication as prescribed.    Other orders  -     fluconazole (DIFLUCAN) 150 MG Tab; Take 1 tablet (150 mg total) by mouth once daily. for 2 days  Dispense: 2 tablet; Refill: 0  -     nystatin (MYCOSTATIN) 100,000 unit/mL suspension; Take 4 mLs (400,000 Units total) by mouth 4 (four) times daily. for 10 days  Dispense: 160 mL; Refill: 0       Continue current medication  Take medications only as prescribed  Healthy diet, exercise  Adequate rest  Adequate hydration  Avoid allergens  Avoid excessive caffeine     Follow up with PCP if not improving

## 2024-08-28 ENCOUNTER — PATIENT MESSAGE (OUTPATIENT)
Dept: FAMILY MEDICINE | Facility: CLINIC | Age: 34
End: 2024-08-28
Payer: COMMERCIAL

## 2024-08-28 DIAGNOSIS — Z01.419 WELL WOMAN EXAM: Primary | ICD-10-CM

## 2024-10-02 RX ORDER — LISDEXAMFETAMINE DIMESYLATE 30 MG/1
30 CAPSULE ORAL EVERY MORNING
Qty: 30 CAPSULE | Refills: 0 | Status: SHIPPED | OUTPATIENT
Start: 2024-10-02

## 2024-10-21 ENCOUNTER — OFFICE VISIT (OUTPATIENT)
Dept: OBSTETRICS AND GYNECOLOGY | Facility: CLINIC | Age: 34
End: 2024-10-21
Payer: COMMERCIAL

## 2024-10-21 VITALS
SYSTOLIC BLOOD PRESSURE: 120 MMHG | DIASTOLIC BLOOD PRESSURE: 80 MMHG | HEIGHT: 62 IN | WEIGHT: 153.88 LBS | BODY MASS INDEX: 28.32 KG/M2

## 2024-10-21 DIAGNOSIS — Z11.51 SCREENING FOR HPV (HUMAN PAPILLOMAVIRUS): ICD-10-CM

## 2024-10-21 DIAGNOSIS — Z12.4 CERVICAL CANCER SCREENING: ICD-10-CM

## 2024-10-21 DIAGNOSIS — N92.0 MENORRHAGIA WITH REGULAR CYCLE: ICD-10-CM

## 2024-10-21 DIAGNOSIS — Z01.419 WELL WOMAN EXAM: Primary | ICD-10-CM

## 2024-10-21 PROCEDURE — 3074F SYST BP LT 130 MM HG: CPT | Mod: CPTII,S$GLB,,

## 2024-10-21 PROCEDURE — 88175 CYTOPATH C/V AUTO FLUID REDO: CPT | Performed by: PATHOLOGY

## 2024-10-21 PROCEDURE — 1160F RVW MEDS BY RX/DR IN RCRD: CPT | Mod: CPTII,S$GLB,,

## 2024-10-21 PROCEDURE — 3079F DIAST BP 80-89 MM HG: CPT | Mod: CPTII,S$GLB,,

## 2024-10-21 PROCEDURE — 87624 HPV HI-RISK TYP POOLED RSLT: CPT

## 2024-10-21 PROCEDURE — 99385 PREV VISIT NEW AGE 18-39: CPT | Mod: S$GLB,,,

## 2024-10-21 PROCEDURE — 1159F MED LIST DOCD IN RCRD: CPT | Mod: CPTII,S$GLB,,

## 2024-10-21 PROCEDURE — 3044F HG A1C LEVEL LT 7.0%: CPT | Mod: CPTII,S$GLB,,

## 2024-10-21 PROCEDURE — 99999 PR PBB SHADOW E&M-EST. PATIENT-LVL IV: CPT | Mod: PBBFAC,,,

## 2024-10-21 PROCEDURE — 3008F BODY MASS INDEX DOCD: CPT | Mod: CPTII,S$GLB,,

## 2024-10-21 RX ORDER — DOXYCYCLINE 100 MG/1
CAPSULE ORAL
COMMUNITY
Start: 2024-09-26

## 2024-10-21 NOTE — PROGRESS NOTES
HISTORY OF PRESENT ILLNESS:    Wendy Millan is a 34 y.o. female, , Patient's last menstrual period was 10/16/2024 (approximate).,  presents for a routine annual exam .   Last pap:  - NILM   Gardisil?: Yes  Last mammogram: n/a  Last colon ca screening:  n/a   SA: has never been sexually active   Contraception: abstinence.   Menstrual flow: regular every 28-30 days. Bleeding for 4 days, first few days heaviest. Pad changes 5 times per day. Some grape sized clots. This has been a chronic issue. Discontinued COCP 6 years ago 2/2 bleeding irregularities. Last pelvic ultrasound in 2018- normal. Does have DA, has received two iron infusions this year. Follow up with hematology in two weeks     This is the extent of the patient's complaints at this time.     Past Medical History:   Diagnosis Date    Asthma     seasonal    GERD (gastroesophageal reflux disease)     Kidney stones     PONV (postoperative nausea and vomiting)     Spinal headache        Past Surgical History:   Procedure Laterality Date    EXTRACORPOREAL SHOCK WAVE LITHOTRIPSY      with stent placement    STOMACH SURGERY  2017    sleeve, 60 lbs weight loss    TONSILLECTOMY, ADENOIDECTOMY N/A 2023    Procedure: TONSILLECTOMY AND ADENOIDECTOMY;  Surgeon: Rome Cano MD;  Location: [facility];  Service: ENT;  Laterality: N/A;    WISDOM TOOTH EXTRACTION         MEDICATIONS AND ALLERGIES:      Current Outpatient Medications:     albuterol (PROVENTIL/VENTOLIN HFA) 90 mcg/actuation inhaler, Inhale 2 puffs into the lungs every 6 (six) hours as needed for Wheezing. Rescue, Disp: 18 g, Rfl: 1    doxycycline (VIBRAMYCIN) 100 MG Cap, Take by mouth., Disp: , Rfl:     FLUoxetine 40 MG capsule, Take 1 capsule (40 mg total) by mouth once daily., Disp: 90 capsule, Rfl: 1    fluticasone propionate (FLONASE) 50 mcg/actuation nasal spray, 2 sprays (100 mcg total) by Each Nostril route once daily., Disp: 9.9 mL, Rfl: 3    hydrOXYzine HCL (ATARAX) 10  MG Tab, Take 1 tablet (10 mg total) by mouth 3 (three) times daily as needed (anxiey)., Disp: 30 tablet, Rfl: 3    lisdexamfetamine (VYVANSE) 30 MG capsule, Take 1 capsule (30 mg total) by mouth every morning., Disp: 30 capsule, Rfl: 0    multivitamin with minerals (HAIR,SKIN AND NAILS ORAL), Take by mouth., Disp: , Rfl:     omeprazole (PRILOSEC) 40 MG capsule, Take 1 capsule (40 mg total) by mouth once daily., Disp: 90 capsule, Rfl: 3    promethazine (PHENERGAN) 25 MG tablet, Take 1 tablet (25 mg total) by mouth every 4 (four) hours., Disp: 30 tablet, Rfl: 0    triamcinolone acetonide 0.5% (KENALOG) 0.5 % Crea, Apply topically 2 (two) times daily., Disp: 15 g, Rfl: 1  No current facility-administered medications for this visit.    Facility-Administered Medications Ordered in Other Visits:     lactated ringers infusion, , Intravenous, Continuous, Volpi-Abadie, Jacqueline, MD, Stopped at 06/09/23 1109    LIDOcaine (PF) 10 mg/ml (1%) injection 10 mg, 1 mL, Other, Once, Volpi-Abadie, Jacqueline, MD    Review of patient's allergies indicates:  No Known Allergies    Family History   Problem Relation Name Age of Onset    Mitral valve prolapse Mother      Migraines Mother      Allergic rhinitis Mother      Asthma Mother      Colon polyps Mother      Hypertension Father      Hypertension Maternal Grandfather      Diabetes Paternal Grandmother      Angioedema Neg Hx      Eczema Neg Hx      Immunodeficiency Neg Hx      Urticaria Neg Hx      Breast cancer Neg Hx      Ovarian cancer Neg Hx         Social History     Socioeconomic History    Marital status: Single   Tobacco Use    Smoking status: Never    Smokeless tobacco: Never   Substance and Sexual Activity    Alcohol use: Yes     Comment: social    Drug use: No    Sexual activity: Never     Social Drivers of Health     Financial Resource Strain: Low Risk  (1/4/2024)    Overall Financial Resource Strain (CARDIA)     Difficulty of Paying Living Expenses: Not very hard   Food  "Insecurity: No Food Insecurity (2024)    Hunger Vital Sign     Worried About Running Out of Food in the Last Year: Never true     Ran Out of Food in the Last Year: Never true   Transportation Needs: No Transportation Needs (2024)    PRAPARE - Transportation     Lack of Transportation (Medical): No     Lack of Transportation (Non-Medical): No   Physical Activity: Sufficiently Active (2024)    Exercise Vital Sign     Days of Exercise per Week: 5 days     Minutes of Exercise per Session: 40 min   Stress: No Stress Concern Present (2024)    [employer] - Occupational Stress Questionnaire     Feeling of Stress : Only a little   Housing Stability: Low Risk  (2024)    Housing Stability Vital Sign     Unable to Pay for Housing in the Last Year: No     Number of Places Lived in the Last Year: 1     Unstable Housing in the Last Year: No       OB History    Para Term  AB Living   0 0 0 0 0 0   SAB IAB Ectopic Multiple Live Births   0 0 0 0            COMPREHENSIVE GYN HISTORY:  PAP History: Denies abnormal Paps.  Infection History: Denies STDs. Denies PID.  Benign History: Denies uterine fibroids. Denies ovarian cysts. Denies endometriosis. Denies other conditions.  Cancer History: Denies cervical cancer. Denies uterine cancer or hyperplasia. Denies ovarian cancer. Denies vulvar cancer or pre-cancer. Denies vaginal cancer or pre-cancer. Denies breast cancer. Denies colon cancer.      ROS:  GENERAL: No weight changes. No swelling. No fatigue. No fever.  BREASTS: No pain. No lumps. No discharge.  ABDOMEN: No pain. No nausea. No vomiting. No diarrhea. No constipation.  REPRODUCTIVE: No abnormal bleeding. No pelvic pain.   VULVA: No pain. No lesions. No itching.  VAGINA: No relaxation. No itching. No odor. No discharge. No lesions.  URINARY: No incontinence. No nocturia. No frequency. No dysuria.    /80   Ht 5' 2" (1.575 m)   Wt 69.8 kg (153 lb 14.1 oz)   LMP " 10/16/2024 (Approximate)   BMI 28.15 kg/m²     PE:  Physical Exam:   Constitutional: She is oriented to person, place, and time. She appears well-developed and well-nourished. No distress.    HENT:   Head: Normocephalic.    Eyes: Pupils are equal, round, and reactive to light.      Pulmonary/Chest: Effort normal. No respiratory distress. She exhibits no mass, no tenderness, no laceration, no edema, no deformity, no swelling and no retraction. Right breast exhibits no inverted nipple, no mass, no nipple discharge, no skin change, no tenderness, no bleeding and no swelling. Left breast exhibits no inverted nipple, no mass, no nipple discharge, no skin change, no tenderness, no bleeding and no swelling.        Abdominal: Soft. She exhibits no distension. There is no abdominal tenderness.     Genitourinary:    Inguinal canal, uterus, right adnexa and left adnexa normal.      Pelvic exam was performed with patient supine.   The external female genitalia was normal.     Labial bartholins normal.There is no rash, tenderness or lesion on the right labia. There is no rash, tenderness or lesion on the left labia. Cervix is normal. Right adnexum displays no mass, no tenderness and no fullness. Left adnexum displays no mass, no tenderness and no fullness. Vagina exhibits no lesion. There is bleeding (light, menstrual) in the vagina. No erythema, vaginal discharge or tenderness in the vagina.    No signs of injury in the vagina.   Cervix exhibits no motion tenderness, no lesion, no discharge, no friability, no tenderness and no polyp.    pap smear completedUterus consistancy normal and Uerus contour normal            Musculoskeletal: Normal range of motion and moves all extremeties.       Neurological: She is alert and oriented to person, place, and time.    Skin: Skin is warm and dry.    Psychiatric: She has a normal mood and affect. Judgment and thought content normal.       PROCEDURES/ORDERS:  Pap      Assessment/Plan:    Well woman exam  -     Ambulatory referral/consult to Gynecology  -     Liquid-Based Pap Smear, Screening  -     HPV High Risk Genotypes, PCR    Menorrhagia with regular cycle  -     US Pelvis Comp with Transvag NON-OB (xpd; Future; Expected date: 10/21/2024    Cervical cancer screening  -     Liquid-Based Pap Smear, Screening    Screening for HPV (human papillomavirus)  -     HPV High Risk Genotypes, PCR        COUNSELING:  - Discussed considering resuming hormonal contraception which can decrease amount of bleeding and possibly improve anemia.   - Will consider pending ultrasound and next lab results with hematology     The patient was counseled today on:  -A.C.S. Pap and pelvic exam guidelines, recomendations for yearly mammogram, monthly self breast exams and to follow up with her PCP for other health maintenance.    FOLLOW-UP  annually for [employer] or sooner PRN

## 2024-10-22 RX ORDER — FLUOXETINE HYDROCHLORIDE 40 MG/1
40 CAPSULE ORAL DAILY
Qty: 90 CAPSULE | Refills: 1 | Status: SHIPPED | OUTPATIENT
Start: 2024-10-22 | End: 2024-10-25 | Stop reason: SDUPTHER

## 2024-10-23 LAB
HPV HR 12 DNA SPEC QL NAA+PROBE: NEGATIVE
HPV16 AG SPEC QL: NEGATIVE
HPV18 DNA SPEC QL NAA+PROBE: NEGATIVE

## 2024-10-25 ENCOUNTER — OFFICE VISIT (OUTPATIENT)
Dept: PSYCHIATRY | Facility: CLINIC | Age: 34
End: 2024-10-25
Payer: COMMERCIAL

## 2024-10-25 DIAGNOSIS — F90.2 ADHD (ATTENTION DEFICIT HYPERACTIVITY DISORDER), COMBINED TYPE: Primary | ICD-10-CM

## 2024-10-25 DIAGNOSIS — F41.1 GENERALIZED ANXIETY DISORDER: ICD-10-CM

## 2024-10-25 LAB
FINAL PATHOLOGIC DIAGNOSIS: ABNORMAL
Lab: ABNORMAL

## 2024-10-25 RX ORDER — FLUOXETINE HYDROCHLORIDE 40 MG/1
40 CAPSULE ORAL DAILY
Qty: 90 CAPSULE | Refills: 1 | Status: SHIPPED | OUTPATIENT
Start: 2024-10-25 | End: 2025-04-23

## 2024-10-25 RX ORDER — LISDEXAMFETAMINE DIMESYLATE 30 MG/1
30 CAPSULE ORAL EVERY MORNING
Qty: 30 CAPSULE | Refills: 0 | Status: SHIPPED | OUTPATIENT
Start: 2024-10-25

## 2024-10-25 NOTE — PROGRESS NOTES
The patient location is: [place], [place]  The chief complaint leading to consultation is: ADHD, anxiety  Visit type: Virtual visit with synchronous audio and video  Each patient to whom he or she provides medical services by telemedicine is:  (1) informed of the relationship between the physician and patient and the respective role of any other health care provider with respect to management of the patient; and (2) notified that he or she may decline to receive medical services by telemedicine and may withdraw from such care at any time.  Face-to-Face time: 11 minutes    Notes:        Outpatient Psychiatry Follow-Up Visit    Clinical Status of Patient: Outpatient (Ambulatory)  10/25/2024     Chief Complaint: 34 year old female presenting today for a follow-up.       Interval History and Content of Current Session:  Interim Events/Subjective Report/Content of Current Session:  follow-up appointment.    Pt is a 34 year old female with past psychiatric hx of ADHD and anxiety who presents for follow-up treatment. Pt reported good, stable mood with no anxiety highs. Pt has not been taking Atarax. Pt finds Vyvanse helpful. Pt got a new strauss retriever puppy. Pt denied any other major changes or new stressors.    Past Psychiatric hx: Vyvanse    Past Medical hx:   Past Medical History:   Diagnosis Date    Asthma     seasonal    GERD (gastroesophageal reflux disease)     Kidney stones     PONV (postoperative nausea and vomiting)     Spinal headache         Interim hx:  Medication changes last visit:   none  Anxiety: mild - unchanged  Depression: pt denied     Denies suicidal/homicidal ideations.  Denies hopelessness/worthlessness.    Denies auditory/visual hallucinations      Alcohol: minimal, infrequent  Drug: pt denied  Caffeine: minimal use  Tobacco: pt denied      Review of Systems   PSYCHIATRIC: Pertinent items are noted in the narrative.        CONSTITUTIONAL: weight stable    Past Medical, Family and  Social History: The patient's past medical, family and social history have been reviewed and updated as appropriate within the electronic medical record. See encounter notes.     Current Psychiatric Medication:  Vyvanse 30mg Q AM, Prozac 40mg Q D, and Atarax 10mg TID PRN     Compliance: yes      Side effects: pt denied     Risk Parameters:  Patient reports no suicidal ideation  Patient reports no homicidal ideation  Patient reports no self-injurious behavior  Patient reports no violent behavior     Exam (detailed: at least 9 elements; comprehensive: all 15 elements)   Constitutional  Vitals:  Most recent vital signs, dated less than 90 days prior to this appointment, were reviewed.        General:  unremarkable, age appropriate, casual attire, good eye contact, good rapport       Musculoskeletal  Muscle Strength/Tone:  no flaccidity, no tremor    Gait & Station:  normal      Psychiatric                       Speech:  normal tone, normal rate, rhythm, and volume   Mood & Affect:   Depressed, anxious         Thought Process:   Goal directed; Linear    Associations:   intact   Thought Content:   No SI/HI, delusions, or paranoia, no AV/VH   Insight & Judgement:   Good, adequate to circumstances   Orientation:   grossly intact; alert and oriented x 4    Memory:  intact for content of interview    Language:  grossly intact, can repeat    Attention Span  : Grossly intact for content of interview   Fund of Knowledge:   intact and appropriate to age and level of education        Assessment and Diagnosis   Status/Progress: stable     Impression:Pt reported lifelong chronic worry. Pt described excessive, unproductive worry that is difficult to control. Pt explained that worrying about things outside of loc[place] of control and worst case scenarios and described this worry as ruminative consistent with generalized anxiety disorder. Pt also endorses symptoms consistent with ADHD and after testing a diagnosis is confirmed. Pt should  continue in psychiatric medication management.    Diagnosis(es):   1) Generalized Anxiety Disorder  2) ADHD, combined type    Intervention/Counseling/Treatment Plan   Medication Management:      1. Continue Vyvanse 30mg Q AM, Prozac 40mg Q D, and Atarax 10mg TID PRN     2. Call to report any worsening of symptoms or problems with the medication. Pt instructed to go to ER with thoughts of harming self, others    Psychotherapy:   Target symptoms: anxiety, attention/concentration  Why chosen therapy is appropriate versus another modality: CBT used; relevant to diagnosis, patient responds to this modality  Outcome monitoring methods: self-report, observation  Therapeutic intervention type: Cognitive Behavioral Therapy  Topics discussed/themes: building skills sets for symptom management, symptom recognition, nutrition, exercise  The patient's response to the intervention is good  Patient's response to treatment is: good.   The patient's progress toward treatment goals: stable     Return to clinic: 6 months    -Cognitive-Behavioral/Supportive therapy and psychoeducation provided  -R/B/SE's of medications discussed with the pt who expresses understanding and chooses to take medications as prescribed.   -Pt instructed to call clinic, 911 or go to nearest emergency room if sxs worsen or pt is in   crisis. The pt expresses understanding.    Zen Garcia, PhD, MP

## 2024-10-31 ENCOUNTER — PATIENT MESSAGE (OUTPATIENT)
Dept: FAMILY MEDICINE | Facility: CLINIC | Age: 34
End: 2024-10-31
Payer: COMMERCIAL

## 2024-10-31 RX ORDER — BUTALBITAL, ACETAMINOPHEN AND CAFFEINE 50; 325; 40 MG/1; MG/1; MG/1
1 TABLET ORAL EVERY 6 HOURS PRN
Qty: 28 TABLET | Refills: 0 | Status: SHIPPED | OUTPATIENT
Start: 2024-10-31 | End: 2024-11-30

## 2024-11-01 ENCOUNTER — HOSPITAL ENCOUNTER (OUTPATIENT)
Dept: RADIOLOGY | Facility: HOSPITAL | Age: 34
Discharge: HOME | End: 2024-11-01
Payer: COMMERCIAL

## 2024-11-01 DIAGNOSIS — N92.0 MENORRHAGIA WITH REGULAR CYCLE: ICD-10-CM

## 2024-11-01 PROCEDURE — 76856 US EXAM PELVIC COMPLETE: CPT | Mod: 26,,, | Performed by: RADIOLOGY

## 2024-11-01 PROCEDURE — 76856 US EXAM PELVIC COMPLETE: CPT | Mod: TC,PN

## 2024-11-01 PROCEDURE — 76830 TRANSVAGINAL US NON-OB: CPT | Mod: 26,,, | Performed by: RADIOLOGY

## 2024-11-13 DIAGNOSIS — F90.0 ADHD (ATTENTION DEFICIT HYPERACTIVITY DISORDER), INATTENTIVE TYPE: Primary | ICD-10-CM

## 2024-11-13 RX ORDER — LISDEXAMFETAMINE DIMESYLATE 30 MG/1
30 CAPSULE ORAL EVERY MORNING
Qty: 30 CAPSULE | Refills: 0 | Status: SHIPPED | OUTPATIENT
Start: 2024-11-13

## 2024-12-17 ENCOUNTER — OFFICE VISIT (OUTPATIENT)
Dept: OBSTETRICS AND GYNECOLOGY | Facility: CLINIC | Age: 34
End: 2024-12-17
Payer: COMMERCIAL

## 2024-12-17 VITALS
WEIGHT: 158.5 LBS | BODY MASS INDEX: 29.17 KG/M2 | DIASTOLIC BLOOD PRESSURE: 62 MMHG | SYSTOLIC BLOOD PRESSURE: 114 MMHG | HEIGHT: 62 IN

## 2024-12-17 DIAGNOSIS — R87.615 ENCOUNTER FOR REPEAT PAP SMEAR DUE TO PREVIOUS INSUFFICIENT CERVICAL CELLS: Primary | ICD-10-CM

## 2024-12-17 PROCEDURE — 99499 UNLISTED E&M SERVICE: CPT | Mod: S$GLB,,,

## 2024-12-17 PROCEDURE — 99999 PR PBB SHADOW E&M-EST. PATIENT-LVL III: CPT | Mod: PBBFAC,,,

## 2024-12-17 NOTE — PROGRESS NOTES
Subjective     Patient ID: Wendy Millan is a 34 y.o. female.    Chief Complaint:  repeat pap      History of Present Illness  HPI  33 y/o WF here for pap smear recollect. Scant cells in October d/t menstruation at that times. She denies any concerns.     GYN & OB History  Patient's last menstrual period was 2024 (approximate).   Date of Last Pap: 2024    OB History    Para Term  AB Living   0 0 0 0 0 0   SAB IAB Ectopic Multiple Live Births   0 0 0 0       Patient Active Problem List   Diagnosis    S/P laparoscopic sleeve gastrectomy    Gastroesophageal reflux disease    History of nephrolithiasis    Ureteral stone with hydronephrosis    Generalized anxiety disorder    ADHD (attention deficit hyperactivity disorder), combined type     Past Medical History:   Diagnosis Date    Asthma     seasonal    GERD (gastroesophageal reflux disease)     Kidney stones     PONV (postoperative nausea and vomiting)     Spinal headache      Past Surgical History:   Procedure Laterality Date    EXTRACORPOREAL SHOCK WAVE LITHOTRIPSY      with stent placement    STOMACH SURGERY  2017    sleeve, 60 lbs weight loss    TONSILLECTOMY, ADENOIDECTOMY N/A 2023    Procedure: TONSILLECTOMY AND ADENOIDECTOMY;  Surgeon: Rome Cano MD;  Location: [facility];  Service: ENT;  Laterality: N/A;    WISDOM TOOTH EXTRACTION       Current Outpatient Medications on File Prior to Visit   Medication Sig Dispense Refill    albuterol (PROVENTIL/VENTOLIN HFA) 90 mcg/actuation inhaler Inhale 2 puffs into the lungs every 6 (six) hours as needed for Wheezing. Rescue 18 g 1    FLUoxetine 40 MG capsule Take 1 capsule (40 mg total) by mouth once daily. 90 capsule 1    fluticasone propionate (FLONASE) 50 mcg/actuation nasal spray 2 sprays (100 mcg total) by Each Nostril route once daily. 9.9 mL 3    lisdexamfetamine (VYVANSE) 30 MG capsule Take 1 capsule (30 mg total) by mouth every morning. 30 capsule 0    multivitamin  with minerals (HAIR,SKIN AND NAILS ORAL) Take by mouth.      omeprazole (PRILOSEC) 40 MG capsule Take 1 capsule (40 mg total) by mouth once daily. 90 capsule 3    promethazine (PHENERGAN) 25 MG tablet Take 1 tablet (25 mg total) by mouth every 4 (four) hours. 30 tablet 0    doxycycline (VIBRAMYCIN) 100 MG Cap Take by mouth. (Patient not taking: Reported on 12/17/2024)      hydrOXYzine HCL (ATARAX) 10 MG Tab Take 1 tablet (10 mg total) by mouth 3 (three) times daily as needed (anxiey). (Patient not taking: Reported on 12/17/2024) 30 tablet 3    triamcinolone acetonide 0.5% (KENALOG) 0.5 % Crea Apply topically 2 (two) times daily. (Patient not taking: Reported on 12/17/2024) 15 g 1     Current Facility-Administered Medications on File Prior to Visit   Medication Dose Route Frequency Provider Last Rate Last Admin    lactated ringers infusion   Intravenous Continuous Volpi-Abadie, Jacqueline, MD   Stopped at 06/09/23 1109    LIDOcaine (PF) 10 mg/ml (1%) injection 10 mg  1 mL Other Once Volpi-Abadie, Jacqueline, MD             Review of Systems  Review of Systems   Constitutional:  Negative for chills and fever.   Eyes:  Negative for visual disturbance.   Respiratory:  Negative for shortness of breath.    Cardiovascular:  Negative for chest pain and palpitations.   Gastrointestinal:  Negative for abdominal pain, constipation, diarrhea, nausea and vomiting.   Genitourinary:  Negative for vaginal bleeding and vaginal discharge.   Musculoskeletal:  Negative for back pain.   Integumentary:  Negative for rash.   Neurological:  Negative for seizures, syncope and headaches.   Hematological:  Does not bruise/bleed easily.   Psychiatric/Behavioral:  Negative for depression. The patient is not nervous/anxious.         Objective   Physical Exam:   Constitutional: She is oriented to person, place, and time. She appears well-developed and well-nourished. No distress.    HENT:   Head: Normocephalic.   Nose: No epistaxis.    Eyes:  Pupils are equal, round, and reactive to light.      Pulmonary/Chest: Effort normal. No respiratory distress.        Abdominal: Soft. She exhibits no distension. There is no abdominal tenderness.     Genitourinary:    Inguinal canal, vagina, uterus, right adnexa and left adnexa normal.      Pelvic exam was performed with patient supine.   The external female genitalia was normal.     Labial bartholins normal.There is no rash, tenderness or lesion on the right labia. There is no rash, tenderness or lesion on the left labia. Cervix is normal. Right adnexum displays no mass, no tenderness and no fullness. Left adnexum displays no mass, no tenderness and no fullness. Vagina exhibits no lesion. No erythema, vaginal discharge, tenderness or bleeding in the vagina.    No signs of injury in the vagina.   Cervix exhibits no motion tenderness, no lesion, no discharge, no friability, no tenderness and no polyp.    pap smear completedUterus consistancy normal and Uerus contour normal            Musculoskeletal: Normal range of motion and moves all extremeties.       Neurological: She is alert and oriented to person, place, and time.    Skin: Skin is warm and dry.    Psychiatric: She has a normal mood and affect. Judgment and thought content normal.          Assessment and Plan     1. Encounter for repeat Pap smear due to previous insufficient cervical cells  - Liquid-Based Pap Smear, Screening

## 2024-12-31 ENCOUNTER — PATIENT MESSAGE (OUTPATIENT)
Dept: FAMILY MEDICINE | Facility: CLINIC | Age: 34
End: 2024-12-31
Payer: COMMERCIAL

## 2024-12-31 DIAGNOSIS — F90.0 ADHD (ATTENTION DEFICIT HYPERACTIVITY DISORDER), INATTENTIVE TYPE: ICD-10-CM

## 2024-12-31 RX ORDER — ONDANSETRON 8 MG/1
8 TABLET, ORALLY DISINTEGRATING ORAL 2 TIMES DAILY
Qty: 30 TABLET | Refills: 0 | Status: SHIPPED | OUTPATIENT
Start: 2024-12-31

## 2024-12-31 RX ORDER — LISDEXAMFETAMINE DIMESYLATE 30 MG/1
30 CAPSULE ORAL EVERY MORNING
Qty: 30 CAPSULE | Refills: 0 | Status: SHIPPED | OUTPATIENT
Start: 2024-12-31

## 2025-02-17 DIAGNOSIS — F90.0 ADHD (ATTENTION DEFICIT HYPERACTIVITY DISORDER), INATTENTIVE TYPE: ICD-10-CM

## 2025-02-17 RX ORDER — LISDEXAMFETAMINE DIMESYLATE 30 MG/1
30 CAPSULE ORAL EVERY MORNING
Qty: 30 CAPSULE | Refills: 0 | Status: SHIPPED | OUTPATIENT
Start: 2025-02-17

## 2025-03-05 ENCOUNTER — PATIENT MESSAGE (OUTPATIENT)
Dept: FAMILY MEDICINE | Facility: CLINIC | Age: 35
End: 2025-03-05
Payer: COMMERCIAL

## 2025-03-05 DIAGNOSIS — K21.9 GASTROESOPHAGEAL REFLUX DISEASE, UNSPECIFIED WHETHER ESOPHAGITIS PRESENT: ICD-10-CM

## 2025-03-05 RX ORDER — OMEPRAZOLE 40 MG/1
40 CAPSULE, DELAYED RELEASE ORAL DAILY
Qty: 90 CAPSULE | Refills: 0 | Status: SHIPPED | OUTPATIENT
Start: 2025-03-05

## 2025-03-05 RX ORDER — BUTALBITAL, ACETAMINOPHEN AND CAFFEINE 50; 325; 40 MG/1; MG/1; MG/1
1 TABLET ORAL EVERY 6 HOURS PRN
Qty: 28 TABLET | Refills: 1 | Status: SHIPPED | OUTPATIENT
Start: 2025-03-05 | End: 2025-04-04

## 2025-03-26 ENCOUNTER — OFFICE VISIT (OUTPATIENT)
Dept: PSYCHIATRY | Facility: CLINIC | Age: 35
End: 2025-03-26
Payer: COMMERCIAL

## 2025-03-26 VITALS
SYSTOLIC BLOOD PRESSURE: 116 MMHG | BODY MASS INDEX: 29.17 KG/M2 | DIASTOLIC BLOOD PRESSURE: 83 MMHG | HEIGHT: 62 IN | HEART RATE: 79 BPM | WEIGHT: 158.5 LBS

## 2025-03-26 DIAGNOSIS — F90.2 ADHD (ATTENTION DEFICIT HYPERACTIVITY DISORDER), COMBINED TYPE: Primary | ICD-10-CM

## 2025-03-26 DIAGNOSIS — F84.0 AUTISM: ICD-10-CM

## 2025-03-26 DIAGNOSIS — F90.0 ADHD (ATTENTION DEFICIT HYPERACTIVITY DISORDER), INATTENTIVE TYPE: ICD-10-CM

## 2025-03-26 DIAGNOSIS — F41.1 GENERALIZED ANXIETY DISORDER: ICD-10-CM

## 2025-03-26 PROCEDURE — 99214 OFFICE O/P EST MOD 30 MIN: CPT | Mod: S$GLB,,, | Performed by: PSYCHOLOGIST

## 2025-03-26 PROCEDURE — 3074F SYST BP LT 130 MM HG: CPT | Mod: CPTII,S$GLB,, | Performed by: PSYCHOLOGIST

## 2025-03-26 PROCEDURE — 90833 PSYTX W PT W E/M 30 MIN: CPT | Mod: S$GLB,,, | Performed by: PSYCHOLOGIST

## 2025-03-26 PROCEDURE — 99999 PR PBB SHADOW E&M-EST. PATIENT-LVL IV: CPT | Mod: PBBFAC,,, | Performed by: PSYCHOLOGIST

## 2025-03-26 PROCEDURE — 3008F BODY MASS INDEX DOCD: CPT | Mod: CPTII,S$GLB,, | Performed by: PSYCHOLOGIST

## 2025-03-26 PROCEDURE — 1159F MED LIST DOCD IN RCRD: CPT | Mod: CPTII,S$GLB,, | Performed by: PSYCHOLOGIST

## 2025-03-26 PROCEDURE — G2211 COMPLEX E/M VISIT ADD ON: HCPCS | Mod: S$GLB,,, | Performed by: PSYCHOLOGIST

## 2025-03-26 PROCEDURE — 3079F DIAST BP 80-89 MM HG: CPT | Mod: CPTII,S$GLB,, | Performed by: PSYCHOLOGIST

## 2025-03-26 RX ORDER — FLUOXETINE HYDROCHLORIDE 40 MG/1
40 CAPSULE ORAL DAILY
Qty: 90 CAPSULE | Refills: 1 | Status: SHIPPED | OUTPATIENT
Start: 2025-03-26 | End: 2025-09-22

## 2025-03-26 RX ORDER — LISDEXAMFETAMINE DIMESYLATE 30 MG/1
30 CAPSULE ORAL EVERY MORNING
Qty: 30 CAPSULE | Refills: 0 | Status: SHIPPED | OUTPATIENT
Start: 2025-03-26

## 2025-03-26 NOTE — PROGRESS NOTES
Outpatient Psychiatry Follow-Up Visit    Clinical Status of Patient: Outpatient (Ambulatory)  03/26/2025     Chief Complaint: 34 year old female presenting today for a follow-up.       Interval History and Content of Current Session:  Interim Events/Subjective Report/Content of Current Session:  follow-up appointment.    Pt is a 34 year old female with past psychiatric hx of ADHD and anxiety who presents for follow-up treatment. Pt reported good, stable mood with no anxiety highs. Pt has not been taking Atarax. Pt finds Vyvanse helpful. Pt discussed at length some of her idiosyncrasies and wonders if she has Autism. We discussed her reflection and purpose behind asking these questions. Pt agreed to counseling and would still like an Autism testing referral. Pt denied any other major changes or new stressors.    Past Psychiatric hx: Vyvanse    Past Medical hx:   Past Medical History:   Diagnosis Date    Asthma     seasonal    GERD (gastroesophageal reflux disease)     Kidney stones     PONV (postoperative nausea and vomiting)     Spinal headache         Interim hx:  Medication changes last visit:   none  Anxiety: mild - unchanged  Depression: pt denied     Denies suicidal/homicidal ideations.  Denies hopelessness/worthlessness.    Denies auditory/visual hallucinations      Alcohol: minimal, infrequent  Drug: pt denied  Caffeine: minimal use  Tobacco: pt denied      Review of Systems   PSYCHIATRIC: Pertinent items are noted in the narrative.        CONSTITUTIONAL: weight stable    Past Medical, Family and Social History: The patient's past medical, family and social history have been reviewed and updated as appropriate within the electronic medical record. See encounter notes.     Current Psychiatric Medication:  Vyvanse 30mg Q AM, Prozac 40mg Q D, and Atarax 10mg TID PRN     Compliance: yes      Side effects: pt denied     Risk Parameters:  Patient reports no suicidal ideation  Patient reports no homicidal  ideation  Patient reports no self-injurious behavior  Patient reports no violent behavior     Exam (detailed: at least 9 elements; comprehensive: all 15 elements)   Constitutional  Vitals:  Most recent vital signs, dated less than 90 days prior to this appointment, were reviewed. Pulse:  [79]   BP: (116)/(83)       General:  unremarkable, age appropriate, casual attire, good eye contact, good rapport       Musculoskeletal  Muscle Strength/Tone:  no flaccidity, no tremor    Gait & Station:  normal      Psychiatric                       Speech:  normal tone, normal rate, rhythm, and volume   Mood & Affect:   Depressed, anxious         Thought Process:   Goal directed; Linear    Associations:   intact   Thought Content:   No SI/HI, delusions, or paranoia, no AV/VH   Insight & Judgement:   Good, adequate to circumstances   Orientation:   grossly intact; alert and oriented x 4    Memory:  intact for content of interview    Language:  grossly intact, can repeat    Attention Span  : Grossly intact for content of interview   Fund of Knowledge:   intact and appropriate to age and level of education        Assessment and Diagnosis   Status/Progress: stable     Impression:Pt reported lifelong chronic worry. Pt described excessive, unproductive worry that is difficult to control. Pt explained that worrying about things outside of [place] of control and worst case scenarios and described this worry as ruminative consistent with generalized anxiety disorder. Pt also endorses symptoms consistent with ADHD and after testing a diagnosis is confirmed. Pt should continue in psychiatric medication management.    Diagnosis(es):   1) Generalized Anxiety Disorder  2) ADHD, combined type    Intervention/Counseling/Treatment Plan   Medication Management:      1. Continue Vyvanse 30mg Q AM, Prozac 40mg Q D, and Atarax 10mg TID PRN    2. Start therapy    3. Autism testing referral placed     4. Call to report any worsening of symptoms or  problems with the medication. Pt instructed to go to ER with thoughts of harming self, others    Psychotherapy:   Target symptoms: anxiety, attention/concentration  Why chosen therapy is appropriate versus another modality: CBT used; relevant to diagnosis, patient responds to this modality  Outcome monitoring methods: self-report, observation  Therapeutic intervention type: Cognitive Behavioral Therapy  Topics discussed/themes: building skills sets for symptom management, symptom recognition, nutrition, exercise  The patient's response to the intervention is good  Patient's response to treatment is: good.   The patient's progress toward treatment goals: stable  16 minutes spent with pt in psychotherapy and 5 minutes in medication management     Return to clinic: 6 months    -Cognitive-Behavioral/Supportive therapy and psychoeducation provided  -R/B/SE's of medications discussed with the pt who expresses understanding and chooses to take medications as prescribed.   -Pt instructed to call clinic, 911 or go to nearest emergency room if sxs worsen or pt is in   crisis. The pt expresses understanding.    Zen Garcia, PhD, MP     Visit today included increased complexity associated with the care of the episodic problem associated with mental health. Addressed and managed the longitudinal care of the patient due to the serious and/or complex mental health diagnosis.

## 2025-03-26 NOTE — LETTER
March 26, 2025        [place]  [address]  [facility] [address]             [place] - Psychiatry  [address] [facility] [address]  [facility] [address]  Phone: [phone]   Patient: Wendy Millan   MR Number: [id number]   YOB: 1990   Date of Visit: 3/26/2025       Dear Ms. Thomason:    Thank you for referring Wendy Millan to me for evaluation. Below are the relevant portions of my assessment and plan of care.    Pt is stable on Vyvanse 30mg Q AM, Prozac 40mg Q D, and Atarax 10mg TID PRN. Will continue to monitor and follow.    If you have questions, please do not hesitate to call me. I look forward to following Wendy along with you.    Sincerely,      Zen Garcia, PhD, MP           CC  No Recipients

## 2025-04-29 ENCOUNTER — PATIENT MESSAGE (OUTPATIENT)
Dept: PSYCHIATRY | Facility: CLINIC | Age: 35
End: 2025-04-29
Payer: COMMERCIAL

## 2025-05-08 DIAGNOSIS — F90.0 ADHD (ATTENTION DEFICIT HYPERACTIVITY DISORDER), INATTENTIVE TYPE: ICD-10-CM

## 2025-05-08 RX ORDER — LISDEXAMFETAMINE DIMESYLATE 30 MG/1
30 CAPSULE ORAL EVERY MORNING
Qty: 30 CAPSULE | Refills: 0 | Status: SHIPPED | OUTPATIENT
Start: 2025-05-08

## 2025-06-20 DIAGNOSIS — F90.0 ADHD (ATTENTION DEFICIT HYPERACTIVITY DISORDER), INATTENTIVE TYPE: ICD-10-CM

## 2025-06-20 RX ORDER — LISDEXAMFETAMINE DIMESYLATE 30 MG/1
30 CAPSULE ORAL EVERY MORNING
Qty: 30 CAPSULE | Refills: 0 | Status: SHIPPED | OUTPATIENT
Start: 2025-06-20

## 2025-07-03 ENCOUNTER — PATIENT OUTREACH (OUTPATIENT)
Dept: ADMINISTRATIVE | Facility: HOSPITAL | Age: 35
End: 2025-07-03
Payer: COMMERCIAL

## 2025-07-03 NOTE — PROGRESS NOTES
Population Health Chart Review & Patient Outreach Details      Additional [facility] Health Notes:               Updates Requested / Reviewed:      Updated Care Coordination Note, External Sources: Provation, and Immunizations Reconciliation Completed or Queried: [place]         Health Maintenance Topics Overdue:      [facility] Score: 0     Patient is not due for any topics at this time.                       Health Maintenance Topic(s) Outreach Outcomes & Actions Taken:    Colorectal Cancer Screening - Outreach Outcomes & Actions Taken  : External Records Uploaded, Care Team Updated, & History Updated if Applicable

## 2025-07-09 RX ORDER — FLUOXETINE HYDROCHLORIDE 40 MG/1
40 CAPSULE ORAL DAILY
Qty: 90 CAPSULE | Refills: 1 | Status: SHIPPED | OUTPATIENT
Start: 2025-07-09 | End: 2026-01-05

## 2025-07-17 ENCOUNTER — TELEPHONE (OUTPATIENT)
Dept: PSYCHIATRY | Facility: CLINIC | Age: 35
End: 2025-07-17
Payer: COMMERCIAL

## 2025-08-07 DIAGNOSIS — F90.0 ADHD (ATTENTION DEFICIT HYPERACTIVITY DISORDER), INATTENTIVE TYPE: ICD-10-CM

## 2025-08-07 RX ORDER — LISDEXAMFETAMINE DIMESYLATE 30 MG/1
30 CAPSULE ORAL EVERY MORNING
Qty: 30 CAPSULE | Refills: 0 | Status: SHIPPED | OUTPATIENT
Start: 2025-08-07

## 2025-08-19 ENCOUNTER — PATIENT MESSAGE (OUTPATIENT)
Dept: ADMINISTRATIVE | Facility: HOSPITAL | Age: 35
End: 2025-08-19
Payer: COMMERCIAL

## (undated) DEVICE — SCISSOR CURVED ENDOPATH 5MM

## (undated) DEVICE — TRANSFER MATTRES LATERAL 34

## (undated) DEVICE — PERISTRIPS DRY STAPLE LINE

## (undated) DEVICE — TROCAR KII FIOS 12MM X 100MM

## (undated) DEVICE — KIT PROCEDURE STER INLET CLOSU

## (undated) DEVICE — SYR 50CC LL

## (undated) DEVICE — SLEEVE SCD EXPRESS CALF MEDIUM

## (undated) DEVICE — PACK CUSTOM UNIV BASIN SLI

## (undated) DEVICE — SET DECANTER MEDICHOICE

## (undated) DEVICE — SUT 2/0 30IN SILK BLK BRAI

## (undated) DEVICE — SOL NACL 0.9% INJ PF/100 150

## (undated) DEVICE — SUT 0 VICRYL / UR6 (J603)

## (undated) DEVICE — NDL SPINAL 18GX3.5 SPINOCAN

## (undated) DEVICE — SYR SLIP TIP 10ML SHIELD

## (undated) DEVICE — RELOAD ENDO LINEAR RELOD GR 60

## (undated) DEVICE — KIT FIBRIN SEALANT EVICEL 5 ML

## (undated) DEVICE — TROCAR KII FIOS 5MM X 100MM

## (undated) DEVICE — STAPLER ECHELON FLEX 60MM 44CM

## (undated) DEVICE — SEE MEDLINE ITEM 152622

## (undated) DEVICE — SUT MONOCRYL 4-0 SH UND MON

## (undated) DEVICE — UNDERGLOVES BIOGEL PI SIZE 8

## (undated) DEVICE — RELOAD ECHELON FLEX BLU 60MM

## (undated) DEVICE — DRAPE ABDOMINAL TIBURON 14X11

## (undated) DEVICE — SEE MEDLINE ITEM 146313

## (undated) DEVICE — CLOSURE SKIN STERI STRIP 1/2X4

## (undated) DEVICE — ELECTRODE REM PLYHSV RETURN 9

## (undated) DEVICE — SEE MEDLINE ITEM 146292

## (undated) DEVICE — SEE MEDLINE ITEM 152680

## (undated) DEVICE — GLOVE SURG ULTRA TOUCH 7.5

## (undated) DEVICE — CLIP ENDO LIGATION LARGE CLIPS

## (undated) DEVICE — SOL CLEARIFY VISUALIZATION LAP

## (undated) DEVICE — APPLICATOR CHLORAPREP ORN 26ML

## (undated) DEVICE — SUT ETHIBOND EX 0SH 30IN GR

## (undated) DEVICE — SHEARS HARMONIC 36CM HD 1000I

## (undated) DEVICE — BANDAGE ADHESIVE

## (undated) DEVICE — STRAP OR TABLE 5IN X 72IN

## (undated) DEVICE — SOL IRR NACL .9% 3000ML

## (undated) DEVICE — SEE MEDLINE ITEM 157117

## (undated) DEVICE — GLOVE SURG ULTRA TOUCH 8

## (undated) DEVICE — BLADE SURG CARBON STEEL SZ11

## (undated) DEVICE — LINER SUCTION 3000CC

## (undated) DEVICE — KIT SEAL HEMSTAT EVICEL 35CM

## (undated) DEVICE — TUBING PNEUMO

## (undated) DEVICE — IRRIGATOR SUCTION W/TIP